# Patient Record
Sex: MALE | Race: WHITE | NOT HISPANIC OR LATINO | URBAN - METROPOLITAN AREA
[De-identification: names, ages, dates, MRNs, and addresses within clinical notes are randomized per-mention and may not be internally consistent; named-entity substitution may affect disease eponyms.]

---

## 2017-02-20 ENCOUNTER — EMERGENCY (EMERGENCY)
Facility: HOSPITAL | Age: 52
LOS: 1 days | Discharge: HOME | End: 2017-02-20
Attending: EMERGENCY MEDICINE | Admitting: EMERGENCY MEDICINE
Payer: COMMERCIAL

## 2017-02-20 VITALS
TEMPERATURE: 98 F | DIASTOLIC BLOOD PRESSURE: 97 MMHG | WEIGHT: 220.46 LBS | RESPIRATION RATE: 18 BRPM | HEIGHT: 68 IN | HEART RATE: 80 BPM | SYSTOLIC BLOOD PRESSURE: 145 MMHG | OXYGEN SATURATION: 97 %

## 2017-02-20 DIAGNOSIS — Z79.2 LONG TERM (CURRENT) USE OF ANTIBIOTICS: ICD-10-CM

## 2017-02-20 DIAGNOSIS — Z79.899 OTHER LONG TERM (CURRENT) DRUG THERAPY: ICD-10-CM

## 2017-02-20 DIAGNOSIS — E03.9 HYPOTHYROIDISM, UNSPECIFIED: ICD-10-CM

## 2017-02-20 DIAGNOSIS — S22.39XA FRACTURE OF ONE RIB, UNSPECIFIED SIDE, INITIAL ENCOUNTER FOR CLOSED FRACTURE: Chronic | ICD-10-CM

## 2017-02-20 DIAGNOSIS — M79.671 PAIN IN RIGHT FOOT: ICD-10-CM

## 2017-02-20 DIAGNOSIS — Z79.1 LONG TERM (CURRENT) USE OF NON-STEROIDAL ANTI-INFLAMMATORIES (NSAID): ICD-10-CM

## 2017-02-20 DIAGNOSIS — L03.031 CELLULITIS OF RIGHT TOE: ICD-10-CM

## 2017-02-20 PROCEDURE — 99283 EMERGENCY DEPT VISIT LOW MDM: CPT | Mod: 25

## 2017-02-20 PROCEDURE — 99053 MED SERV 10PM-8AM 24 HR FAC: CPT

## 2017-02-20 PROCEDURE — 99283 EMERGENCY DEPT VISIT LOW MDM: CPT

## 2017-02-20 RX ORDER — AZTREONAM 2 G
1 VIAL (EA) INJECTION
Qty: 20 | Refills: 0 | OUTPATIENT
Start: 2017-02-20 | End: 2017-03-02

## 2017-02-20 RX ORDER — IBUPROFEN 200 MG
600 TABLET ORAL ONCE
Qty: 0 | Refills: 0 | Status: COMPLETED | OUTPATIENT
Start: 2017-02-20 | End: 2017-02-20

## 2017-02-20 RX ORDER — CEPHALEXIN 500 MG
500 CAPSULE ORAL ONCE
Qty: 0 | Refills: 0 | Status: COMPLETED | OUTPATIENT
Start: 2017-02-20 | End: 2017-02-20

## 2017-02-20 RX ORDER — CEPHALEXIN 500 MG
1 CAPSULE ORAL
Qty: 40 | Refills: 0 | OUTPATIENT
Start: 2017-02-20 | End: 2017-03-02

## 2017-02-20 RX ADMIN — Medication 500 MILLIGRAM(S): at 23:36

## 2017-02-20 RX ADMIN — Medication 600 MILLIGRAM(S): at 23:36

## 2017-02-20 RX ADMIN — Medication 1 TABLET(S): at 23:36

## 2017-02-20 NOTE — ED PROVIDER NOTE - OBJECTIVE STATEMENT
toe pain  52 yo , noticed sore btw his pinky and fourth toe on his rt foot today, + painful, worse w pressing on it and worse with walking, ho of similar infection in past which resolved w antibiotics.

## 2017-02-20 NOTE — ED PROVIDER NOTE - SKIN, MLM
ulceration btw pinky and 4th toe w small 1 cm area of cellulitis cellulitis , no drainage or fluctuance

## 2017-03-06 ENCOUNTER — EMERGENCY (EMERGENCY)
Facility: HOSPITAL | Age: 52
LOS: 1 days | Discharge: HOME | End: 2017-03-06
Attending: EMERGENCY MEDICINE | Admitting: EMERGENCY MEDICINE
Payer: COMMERCIAL

## 2017-03-06 VITALS
RESPIRATION RATE: 18 BRPM | OXYGEN SATURATION: 97 % | SYSTOLIC BLOOD PRESSURE: 143 MMHG | TEMPERATURE: 99 F | WEIGHT: 210.1 LBS | DIASTOLIC BLOOD PRESSURE: 79 MMHG | HEART RATE: 92 BPM

## 2017-03-06 DIAGNOSIS — Z79.1 LONG TERM (CURRENT) USE OF NON-STEROIDAL ANTI-INFLAMMATORIES (NSAID): ICD-10-CM

## 2017-03-06 DIAGNOSIS — Z76.0 ENCOUNTER FOR ISSUE OF REPEAT PRESCRIPTION: ICD-10-CM

## 2017-03-06 DIAGNOSIS — S22.39XA FRACTURE OF ONE RIB, UNSPECIFIED SIDE, INITIAL ENCOUNTER FOR CLOSED FRACTURE: Chronic | ICD-10-CM

## 2017-03-06 DIAGNOSIS — Z79.2 LONG TERM (CURRENT) USE OF ANTIBIOTICS: ICD-10-CM

## 2017-03-06 DIAGNOSIS — Z72.0 TOBACCO USE: ICD-10-CM

## 2017-03-06 DIAGNOSIS — Z79.899 OTHER LONG TERM (CURRENT) DRUG THERAPY: ICD-10-CM

## 2017-03-06 PROCEDURE — 99283 EMERGENCY DEPT VISIT LOW MDM: CPT

## 2017-03-06 PROCEDURE — 99053 MED SERV 10PM-8AM 24 HR FAC: CPT

## 2017-03-06 PROCEDURE — 99283 EMERGENCY DEPT VISIT LOW MDM: CPT | Mod: 25

## 2017-03-06 RX ORDER — LEVOTHYROXINE SODIUM 125 MCG
200 TABLET ORAL ONCE
Qty: 0 | Refills: 0 | Status: COMPLETED | OUTPATIENT
Start: 2017-03-06 | End: 2017-03-06

## 2017-03-06 RX ORDER — LEVOTHYROXINE SODIUM 125 MCG
1 TABLET ORAL
Qty: 7 | Refills: 0 | OUTPATIENT
Start: 2017-03-06 | End: 2017-03-13

## 2017-03-06 RX ADMIN — Medication 200 MICROGRAM(S): at 01:55

## 2017-03-06 NOTE — ED PROVIDER NOTE - OBJECTIVE STATEMENT
50 y/o m hx hypothyroidism presents stating he needs a refill of his levothyroxine.  Pt stating he last took yesterday, concerned when he misses a dose he feels tired.  Denies n/v/d, fever, all other ROS negative.

## 2017-03-06 NOTE — ED PROVIDER NOTE - MEDICAL DECISION MAKING DETAILS
50 y/o m hx hypothyroidism presents asking for rx refill levothyroxine, stating hasn't taken since yesterday.  Pt given 1 dose in ED, 7 day rx, f/u pmd.

## 2017-04-04 ENCOUNTER — EMERGENCY (EMERGENCY)
Facility: HOSPITAL | Age: 52
LOS: 1 days | Discharge: HOME | End: 2017-04-04
Attending: EMERGENCY MEDICINE | Admitting: EMERGENCY MEDICINE
Payer: COMMERCIAL

## 2017-04-04 VITALS
SYSTOLIC BLOOD PRESSURE: 160 MMHG | DIASTOLIC BLOOD PRESSURE: 100 MMHG | RESPIRATION RATE: 18 BRPM | HEART RATE: 78 BPM | TEMPERATURE: 98 F | OXYGEN SATURATION: 97 %

## 2017-04-04 DIAGNOSIS — Z79.1 LONG TERM (CURRENT) USE OF NON-STEROIDAL ANTI-INFLAMMATORIES (NSAID): ICD-10-CM

## 2017-04-04 DIAGNOSIS — S22.39XA FRACTURE OF ONE RIB, UNSPECIFIED SIDE, INITIAL ENCOUNTER FOR CLOSED FRACTURE: Chronic | ICD-10-CM

## 2017-04-04 DIAGNOSIS — R07.81 PLEURODYNIA: ICD-10-CM

## 2017-04-04 DIAGNOSIS — Z79.2 LONG TERM (CURRENT) USE OF ANTIBIOTICS: ICD-10-CM

## 2017-04-04 DIAGNOSIS — Z79.899 OTHER LONG TERM (CURRENT) DRUG THERAPY: ICD-10-CM

## 2017-04-04 DIAGNOSIS — Z72.0 TOBACCO USE: ICD-10-CM

## 2017-04-04 PROCEDURE — 99053 MED SERV 10PM-8AM 24 HR FAC: CPT

## 2017-04-04 PROCEDURE — 99283 EMERGENCY DEPT VISIT LOW MDM: CPT | Mod: 25

## 2017-04-04 PROCEDURE — 99283 EMERGENCY DEPT VISIT LOW MDM: CPT

## 2017-04-04 RX ORDER — METHOCARBAMOL 500 MG/1
1 TABLET, FILM COATED ORAL
Qty: 15 | Refills: 0 | OUTPATIENT
Start: 2017-04-04 | End: 2017-04-09

## 2017-04-04 RX ORDER — CYCLOBENZAPRINE HYDROCHLORIDE 10 MG/1
5 TABLET, FILM COATED ORAL ONCE
Qty: 0 | Refills: 0 | Status: COMPLETED | OUTPATIENT
Start: 2017-04-04 | End: 2017-04-04

## 2017-04-04 RX ORDER — IBUPROFEN 200 MG
600 TABLET ORAL ONCE
Qty: 0 | Refills: 0 | Status: COMPLETED | OUTPATIENT
Start: 2017-04-04 | End: 2017-04-04

## 2017-04-04 RX ADMIN — Medication 600 MILLIGRAM(S): at 03:38

## 2017-04-04 RX ADMIN — CYCLOBENZAPRINE HYDROCHLORIDE 5 MILLIGRAM(S): 10 TABLET, FILM COATED ORAL at 03:38

## 2017-04-04 NOTE — ED PROVIDER NOTE - OBJECTIVE STATEMENT
51 m co rib pain- mild-moderate pain x 2 weeks worse with coughing  lower rib lateral ribs  taking naprosyn and getting massages with some relief  no sob no cp no n/v  no radiation  no f/c

## 2017-04-04 NOTE — ED ADULT NURSE NOTE - CHPI ED SYMPTOMS NEG
no stiffness/urinary incontinence/no tingling/no abrasion/no deformity/no bruising/no fever/no numbness

## 2017-04-04 NOTE — ED ADULT NURSE NOTE - OBJECTIVE STATEMENT
Pt walked into Ed with c/o of lower back pain that radiates to the right side of rib. Onset was 14 days ago. Pt. denies taking anything for the pain. PT. has no bruising and skin is intact. Denies hitting head, or LOC. Pt has steady gait.

## 2017-05-31 ENCOUNTER — EMERGENCY (EMERGENCY)
Facility: HOSPITAL | Age: 52
LOS: 1 days | Discharge: HOME | End: 2017-05-31
Attending: EMERGENCY MEDICINE | Admitting: EMERGENCY MEDICINE
Payer: COMMERCIAL

## 2017-05-31 VITALS
OXYGEN SATURATION: 96 % | SYSTOLIC BLOOD PRESSURE: 135 MMHG | TEMPERATURE: 97 F | DIASTOLIC BLOOD PRESSURE: 84 MMHG | RESPIRATION RATE: 18 BRPM | HEART RATE: 70 BPM

## 2017-05-31 DIAGNOSIS — L03.115 CELLULITIS OF RIGHT LOWER LIMB: ICD-10-CM

## 2017-05-31 DIAGNOSIS — L08.9 LOCAL INFECTION OF THE SKIN AND SUBCUTANEOUS TISSUE, UNSPECIFIED: ICD-10-CM

## 2017-05-31 DIAGNOSIS — Z79.1 LONG TERM (CURRENT) USE OF NON-STEROIDAL ANTI-INFLAMMATORIES (NSAID): ICD-10-CM

## 2017-05-31 DIAGNOSIS — Z76.0 ENCOUNTER FOR ISSUE OF REPEAT PRESCRIPTION: ICD-10-CM

## 2017-05-31 DIAGNOSIS — Z79.2 LONG TERM (CURRENT) USE OF ANTIBIOTICS: ICD-10-CM

## 2017-05-31 DIAGNOSIS — S22.39XA FRACTURE OF ONE RIB, UNSPECIFIED SIDE, INITIAL ENCOUNTER FOR CLOSED FRACTURE: Chronic | ICD-10-CM

## 2017-05-31 DIAGNOSIS — Z79.899 OTHER LONG TERM (CURRENT) DRUG THERAPY: ICD-10-CM

## 2017-05-31 DIAGNOSIS — E03.9 HYPOTHYROIDISM, UNSPECIFIED: ICD-10-CM

## 2017-05-31 PROCEDURE — 99283 EMERGENCY DEPT VISIT LOW MDM: CPT

## 2017-05-31 RX ORDER — CEPHALEXIN 500 MG
500 CAPSULE ORAL ONCE
Qty: 0 | Refills: 0 | Status: COMPLETED | OUTPATIENT
Start: 2017-05-31 | End: 2017-05-31

## 2017-05-31 RX ORDER — CEPHALEXIN 500 MG
1 CAPSULE ORAL
Qty: 40 | Refills: 0 | OUTPATIENT
Start: 2017-05-31 | End: 2017-06-10

## 2017-05-31 RX ORDER — LEVOTHYROXINE SODIUM 125 MCG
1 TABLET ORAL
Qty: 7 | Refills: 0 | OUTPATIENT
Start: 2017-05-31 | End: 2017-06-07

## 2017-05-31 RX ORDER — AZTREONAM 2 G
1 VIAL (EA) INJECTION
Qty: 20 | Refills: 0 | OUTPATIENT
Start: 2017-05-31 | End: 2017-06-10

## 2017-05-31 RX ADMIN — Medication 500 MILLIGRAM(S): at 10:42

## 2017-05-31 RX ADMIN — Medication 1 TABLET(S): at 10:42

## 2017-05-31 NOTE — ED PROVIDER NOTE - OBJECTIVE STATEMENT
50 y/o male with hx of hypothyroidism c/o foot infection. pt states long hx of tinea pedis to feet and redness and pain began to 5th webspace past 24 hrs. pt reports hx of abscesses to area in past. no fever or chills. no d/c. no trauma. Also pt requesting synthroid refill. pt states almost out of medication and does not have an upcoming appt with pmd

## 2017-05-31 NOTE — ED PROVIDER NOTE - MEDICAL DECISION MAKING DETAILS
cellulitis and tinea pedis right foot. will tx with bactrim and keflex and wound check in 2 days. neurovascular intact. lotrimin cream given. d/w pt will refill synthroid for 1 wk but needs to f/u with pmd for future refills

## 2017-05-31 NOTE — ED PROVIDER NOTE - PHYSICAL EXAMINATION
+ cracking skin between toes of b/l feet. + redness and induration to 5th webspace right foot. no fluctuance. no streaking. neurovascular intact.

## 2017-06-01 ENCOUNTER — EMERGENCY (EMERGENCY)
Facility: HOSPITAL | Age: 52
LOS: 1 days | Discharge: HOME | End: 2017-06-01
Attending: EMERGENCY MEDICINE | Admitting: EMERGENCY MEDICINE
Payer: COMMERCIAL

## 2017-06-01 VITALS
TEMPERATURE: 98 F | RESPIRATION RATE: 16 BRPM | HEIGHT: 68 IN | SYSTOLIC BLOOD PRESSURE: 126 MMHG | OXYGEN SATURATION: 96 % | HEART RATE: 81 BPM | DIASTOLIC BLOOD PRESSURE: 84 MMHG | WEIGHT: 179.9 LBS

## 2017-06-01 DIAGNOSIS — L02.611 CUTANEOUS ABSCESS OF RIGHT FOOT: ICD-10-CM

## 2017-06-01 DIAGNOSIS — L02.213 CUTANEOUS ABSCESS OF CHEST WALL: ICD-10-CM

## 2017-06-01 DIAGNOSIS — L02.411 CUTANEOUS ABSCESS OF RIGHT AXILLA: ICD-10-CM

## 2017-06-01 DIAGNOSIS — E03.9 HYPOTHYROIDISM, UNSPECIFIED: ICD-10-CM

## 2017-06-01 DIAGNOSIS — S22.39XA FRACTURE OF ONE RIB, UNSPECIFIED SIDE, INITIAL ENCOUNTER FOR CLOSED FRACTURE: Chronic | ICD-10-CM

## 2017-06-01 DIAGNOSIS — F17.200 NICOTINE DEPENDENCE, UNSPECIFIED, UNCOMPLICATED: ICD-10-CM

## 2017-06-01 DIAGNOSIS — F19.10 OTHER PSYCHOACTIVE SUBSTANCE ABUSE, UNCOMPLICATED: ICD-10-CM

## 2017-06-01 DIAGNOSIS — Z79.899 OTHER LONG TERM (CURRENT) DRUG THERAPY: ICD-10-CM

## 2017-06-01 PROCEDURE — 99283 EMERGENCY DEPT VISIT LOW MDM: CPT

## 2017-06-01 PROCEDURE — 99283 EMERGENCY DEPT VISIT LOW MDM: CPT | Mod: 25

## 2017-06-01 RX ORDER — NYSTATIN CREAM 100000 [USP'U]/G
1 CREAM TOPICAL ONCE
Qty: 0 | Refills: 0 | Status: COMPLETED | OUTPATIENT
Start: 2017-06-01 | End: 2017-06-01

## 2017-06-01 RX ADMIN — NYSTATIN CREAM 1 APPLICATION(S): 100000 CREAM TOPICAL at 04:04

## 2017-06-01 NOTE — ED PROVIDER NOTE - PHYSICAL EXAMINATION
small excoriation right upper chest noted , Right upper arm with small 2 -3 cm erythematous circular region with along with some firm central induration albeit no fluctuance no drainage + right fifth toe with  moist web space skin breakdown and tinea type process about the foot

## 2017-06-01 NOTE — ED PROVIDER NOTE - CARE PLAN
Principal Discharge DX:	Abscess  Secondary Diagnosis:	Polysubstance abuse  Secondary Diagnosis:	Hypothyroid

## 2017-06-01 NOTE — ED PROVIDER NOTE - OBJECTIVE STATEMENT
patient requests wound evaluations for toe right fifth infective process along with right upper inner arm and breast lesions + states Hx PCP use and thereafter a few days ago noted lesions and came to ED + taking bactrim and keflex + admits to both compliance and Hx problems with this 5th right toe web space in past

## 2017-06-01 NOTE — ED ADULT NURSE NOTE - OBJECTIVE STATEMENT
52 y/o male with hx of polysubstance abuse and abscess arrived to [facility] ER reporting multiple abscess to body( right arm, right foot, and right chest) accompanied with pain. Pt verbalized that he smoked PCP earlier tonight. Pt verbalized that he was evaluated for the abscess to the right foot and was prescribed Bactrim DS and Keflex today and only took one dose. Pt verbalized that the abscess to right arm just started after scratching the area. Upon assessment, multiple abscess noted with no active drainage, abdomen soft, lung fields WNL, breathing is equal and unlabored, pulses palpable. Pt denies fever, chills, LOC, SOB, weakness, fatigue, recent travel, recent injury, chest pain, palpitations, nausea, vomiting, diarrhea, headache, blurred vision, and slurred speech. Care in progress. Awaiting disposition

## 2017-06-01 NOTE — ED PROVIDER NOTE - MEDICAL DECISION MAKING DETAILS
toe seems as could benefit with nystating and podiatry follow up , Upper arm possible early abscess though seemingly not " ready" for I&D and as patient MRSA risk from substance abuse lifestyle agree with bactrm / keflex regime started day + wound checks and wound care discussed

## 2017-06-25 ENCOUNTER — EMERGENCY (EMERGENCY)
Facility: HOSPITAL | Age: 52
LOS: 1 days | Discharge: HOME | End: 2017-06-25
Admitting: EMERGENCY MEDICINE
Payer: COMMERCIAL

## 2017-06-25 VITALS
RESPIRATION RATE: 18 BRPM | DIASTOLIC BLOOD PRESSURE: 93 MMHG | HEART RATE: 76 BPM | OXYGEN SATURATION: 97 % | WEIGHT: 189.6 LBS | TEMPERATURE: 99 F | SYSTOLIC BLOOD PRESSURE: 135 MMHG

## 2017-06-25 DIAGNOSIS — R10.30 LOWER ABDOMINAL PAIN, UNSPECIFIED: ICD-10-CM

## 2017-06-25 DIAGNOSIS — Z79.899 OTHER LONG TERM (CURRENT) DRUG THERAPY: ICD-10-CM

## 2017-06-25 DIAGNOSIS — S22.39XA FRACTURE OF ONE RIB, UNSPECIFIED SIDE, INITIAL ENCOUNTER FOR CLOSED FRACTURE: Chronic | ICD-10-CM

## 2017-06-25 DIAGNOSIS — Z79.2 LONG TERM (CURRENT) USE OF ANTIBIOTICS: ICD-10-CM

## 2017-06-25 DIAGNOSIS — L08.9 LOCAL INFECTION OF THE SKIN AND SUBCUTANEOUS TISSUE, UNSPECIFIED: ICD-10-CM

## 2017-06-25 PROCEDURE — 99283 EMERGENCY DEPT VISIT LOW MDM: CPT

## 2017-06-25 RX ORDER — AZTREONAM 2 G
1 VIAL (EA) INJECTION
Qty: 20 | Refills: 0 | OUTPATIENT
Start: 2017-06-25 | End: 2017-07-05

## 2017-06-25 NOTE — ED PROVIDER NOTE - MEDICAL DECISION MAKING DETAILS
52 y/o pustule on buttock; no drainable abscess, pustule removed, will place on bactrim, recommend warm compresses

## 2017-06-25 NOTE — ED PROVIDER NOTE - OBJECTIVE STATEMENT
50 y/o m presents c/o pimple to right buttock today.  Pt stating he has had several abscesses drained in the past, wanted to be evaluated.  Denies fever, drainage, all other ROS negative.

## 2017-08-08 ENCOUNTER — EMERGENCY (EMERGENCY)
Facility: HOSPITAL | Age: 52
LOS: 1 days | Discharge: HOME | End: 2017-08-08
Attending: EMERGENCY MEDICINE | Admitting: EMERGENCY MEDICINE
Payer: COMMERCIAL

## 2017-08-08 VITALS
SYSTOLIC BLOOD PRESSURE: 149 MMHG | HEART RATE: 76 BPM | WEIGHT: 179.9 LBS | HEIGHT: 68 IN | RESPIRATION RATE: 17 BRPM | OXYGEN SATURATION: 97 % | DIASTOLIC BLOOD PRESSURE: 97 MMHG | TEMPERATURE: 98 F

## 2017-08-08 DIAGNOSIS — Z79.1 LONG TERM (CURRENT) USE OF NON-STEROIDAL ANTI-INFLAMMATORIES (NSAID): ICD-10-CM

## 2017-08-08 DIAGNOSIS — L98.9 DISORDER OF THE SKIN AND SUBCUTANEOUS TISSUE, UNSPECIFIED: ICD-10-CM

## 2017-08-08 DIAGNOSIS — L02.511 CUTANEOUS ABSCESS OF RIGHT HAND: ICD-10-CM

## 2017-08-08 DIAGNOSIS — E03.9 HYPOTHYROIDISM, UNSPECIFIED: ICD-10-CM

## 2017-08-08 DIAGNOSIS — Z79.2 LONG TERM (CURRENT) USE OF ANTIBIOTICS: ICD-10-CM

## 2017-08-08 DIAGNOSIS — Z79.899 OTHER LONG TERM (CURRENT) DRUG THERAPY: ICD-10-CM

## 2017-08-08 DIAGNOSIS — S22.39XA FRACTURE OF ONE RIB, UNSPECIFIED SIDE, INITIAL ENCOUNTER FOR CLOSED FRACTURE: Chronic | ICD-10-CM

## 2017-08-08 PROCEDURE — 99283 EMERGENCY DEPT VISIT LOW MDM: CPT

## 2017-08-08 RX ORDER — AZTREONAM 2 G
1 VIAL (EA) INJECTION
Qty: 10 | Refills: 0 | OUTPATIENT
Start: 2017-08-08 | End: 2017-08-13

## 2017-08-08 RX ORDER — LEVOTHYROXINE SODIUM 125 MCG
1 TABLET ORAL
Qty: 15 | Refills: 0 | OUTPATIENT
Start: 2017-08-08 | End: 2017-08-23

## 2017-08-08 RX ORDER — BACITRACIN ZINC 500 UNIT/G
1 OINTMENT IN PACKET (EA) TOPICAL ONCE
Qty: 0 | Refills: 0 | Status: COMPLETED | OUTPATIENT
Start: 2017-08-08 | End: 2017-08-08

## 2017-08-08 RX ADMIN — Medication 1 TABLET(S): at 14:06

## 2017-08-08 RX ADMIN — Medication 1 APPLICATION(S): at 14:06

## 2017-08-08 NOTE — ED ADULT TRIAGE NOTE - OTHER COMPLAINTS
pt c.o R foot pain with c.o reoccurring abscess to R foot. denies fever/chills. "I have athletes foot"

## 2017-08-08 NOTE — ED PROVIDER NOTE - MUSCULOSKELETAL, MLM
right foot - erythematous small nonfluctuant lesion by 4th metatarsal head, whitish discharge between fourth and fifth toes most consistent with fungal infection, FROM of toes, no distal nv deficit

## 2017-08-08 NOTE — ED PROVIDER NOTE - OBJECTIVE STATEMENT
50 y/o m c/o blister by fourth metatarsal with lesion between fourth and fifth toes.  Denies fever, chills.  Seen in ED on multiple different occasions for similar symptoms.  Ambulating without difficulty.

## 2017-08-08 NOTE — ED PROVIDER NOTE - MEDICAL DECISION MAKING DETAILS
pt with small erythematous lesion to right fourth metatarsal without evidence of extensive cellulitis or abscess, requesting bacitracin and short course of bactrim - also requesting synthroid refill -

## 2017-08-08 NOTE — ED ADULT NURSE NOTE - OBJECTIVE STATEMENT
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 Upon assessment pt noted to have a skin blister in the dorsal area of the right foot, no drainage noted. Pt denies any pain. Awaiting initial evaluation. Will continue to monitor.

## 2017-10-02 ENCOUNTER — EMERGENCY (EMERGENCY)
Facility: HOSPITAL | Age: 52
LOS: 1 days | Discharge: HOME | End: 2017-10-02
Attending: EMERGENCY MEDICINE | Admitting: EMERGENCY MEDICINE
Payer: COMMERCIAL

## 2017-10-02 VITALS
SYSTOLIC BLOOD PRESSURE: 156 MMHG | TEMPERATURE: 98 F | WEIGHT: 199.96 LBS | HEIGHT: 68 IN | RESPIRATION RATE: 17 BRPM | DIASTOLIC BLOOD PRESSURE: 115 MMHG | OXYGEN SATURATION: 96 % | HEART RATE: 79 BPM

## 2017-10-02 VITALS
DIASTOLIC BLOOD PRESSURE: 99 MMHG | OXYGEN SATURATION: 96 % | SYSTOLIC BLOOD PRESSURE: 162 MMHG | RESPIRATION RATE: 17 BRPM | TEMPERATURE: 98 F | HEART RATE: 71 BPM

## 2017-10-02 DIAGNOSIS — Z79.2 LONG TERM (CURRENT) USE OF ANTIBIOTICS: ICD-10-CM

## 2017-10-02 DIAGNOSIS — S22.39XA FRACTURE OF ONE RIB, UNSPECIFIED SIDE, INITIAL ENCOUNTER FOR CLOSED FRACTURE: Chronic | ICD-10-CM

## 2017-10-02 DIAGNOSIS — L03.115 CELLULITIS OF RIGHT LOWER LIMB: ICD-10-CM

## 2017-10-02 DIAGNOSIS — Z79.1 LONG TERM (CURRENT) USE OF NON-STEROIDAL ANTI-INFLAMMATORIES (NSAID): ICD-10-CM

## 2017-10-02 DIAGNOSIS — M79.89 OTHER SPECIFIED SOFT TISSUE DISORDERS: ICD-10-CM

## 2017-10-02 DIAGNOSIS — Z79.899 OTHER LONG TERM (CURRENT) DRUG THERAPY: ICD-10-CM

## 2017-10-02 DIAGNOSIS — E03.9 HYPOTHYROIDISM, UNSPECIFIED: ICD-10-CM

## 2017-10-02 PROCEDURE — 99283 EMERGENCY DEPT VISIT LOW MDM: CPT

## 2017-10-02 RX ORDER — AZTREONAM 2 G
1 VIAL (EA) INJECTION
Qty: 20 | Refills: 0 | OUTPATIENT
Start: 2017-10-02 | End: 2017-10-12

## 2017-10-02 RX ADMIN — Medication 1 TABLET(S): at 19:20

## 2017-10-02 NOTE — ED ADULT TRIAGE NOTE - OTHER COMPLAINTS
pt c.o R foot swelling, redness and discomfort since yesterday. denies injury/trauma. denies fever/chills. pt hypertensive in triage, asymptomatic. denies pmhx.

## 2017-10-02 NOTE — ED ADULT NURSE NOTE - OBJECTIVE STATEMENT
Patient presents to the ED complaining of infection to left foot. Complaining of discomfort. Mild swelling noted to lower foot. Patient states that he usually gets bactrim and that fixes it. No fever or chills.

## 2017-10-02 NOTE — ED PROVIDER NOTE - OBJECTIVE STATEMENT
here with swelling/redness of right foot for past 2 days.  Denies trauma.  States he has had it before and attributed to fungal infection between toes.  Denies ivda.  No fever/chills

## 2017-10-04 ENCOUNTER — INPATIENT (INPATIENT)
Facility: HOSPITAL | Age: 52
LOS: 0 days | Discharge: HOME | DRG: 872 | End: 2017-10-05
Attending: STUDENT IN AN ORGANIZED HEALTH CARE EDUCATION/TRAINING PROGRAM | Admitting: STUDENT IN AN ORGANIZED HEALTH CARE EDUCATION/TRAINING PROGRAM
Payer: COMMERCIAL

## 2017-10-04 VITALS
SYSTOLIC BLOOD PRESSURE: 167 MMHG | TEMPERATURE: 98 F | OXYGEN SATURATION: 98 % | RESPIRATION RATE: 18 BRPM | DIASTOLIC BLOOD PRESSURE: 95 MMHG | HEART RATE: 92 BPM

## 2017-10-04 DIAGNOSIS — Z29.9 ENCOUNTER FOR PROPHYLACTIC MEASURES, UNSPECIFIED: ICD-10-CM

## 2017-10-04 DIAGNOSIS — R63.8 OTHER SYMPTOMS AND SIGNS CONCERNING FOOD AND FLUID INTAKE: ICD-10-CM

## 2017-10-04 DIAGNOSIS — E03.9 HYPOTHYROIDISM, UNSPECIFIED: ICD-10-CM

## 2017-10-04 DIAGNOSIS — D72.829 ELEVATED WHITE BLOOD CELL COUNT, UNSPECIFIED: ICD-10-CM

## 2017-10-04 DIAGNOSIS — S22.39XA FRACTURE OF ONE RIB, UNSPECIFIED SIDE, INITIAL ENCOUNTER FOR CLOSED FRACTURE: Chronic | ICD-10-CM

## 2017-10-04 DIAGNOSIS — R19.00 INTRA-ABDOMINAL AND PELVIC SWELLING, MASS AND LUMP, UNSPECIFIED SITE: ICD-10-CM

## 2017-10-04 DIAGNOSIS — L03.115 CELLULITIS OF RIGHT LOWER LIMB: ICD-10-CM

## 2017-10-04 DIAGNOSIS — E87.2 ACIDOSIS: ICD-10-CM

## 2017-10-04 DIAGNOSIS — F19.10 OTHER PSYCHOACTIVE SUBSTANCE ABUSE, UNCOMPLICATED: ICD-10-CM

## 2017-10-04 DIAGNOSIS — D58.2 OTHER HEMOGLOBINOPATHIES: ICD-10-CM

## 2017-10-04 LAB
ALBUMIN SERPL ELPH-MCNC: 4.7 G/DL — SIGNIFICANT CHANGE UP (ref 3.3–5)
ALP SERPL-CCNC: 70 U/L — SIGNIFICANT CHANGE UP (ref 40–120)
ALT FLD-CCNC: 46 U/L — HIGH (ref 10–45)
ANION GAP SERPL CALC-SCNC: 18 MMOL/L — HIGH (ref 5–17)
APTT BLD: 39.8 SEC — HIGH (ref 27.5–37.4)
AST SERPL-CCNC: 32 U/L — SIGNIFICANT CHANGE UP (ref 10–40)
BASOPHILS NFR BLD AUTO: 0.2 % — SIGNIFICANT CHANGE UP (ref 0–2)
BILIRUB SERPL-MCNC: 1 MG/DL — SIGNIFICANT CHANGE UP (ref 0.2–1.2)
BUN SERPL-MCNC: 13 MG/DL — SIGNIFICANT CHANGE UP (ref 7–23)
CALCIUM SERPL-MCNC: 10.1 MG/DL — SIGNIFICANT CHANGE UP (ref 8.4–10.5)
CHLORIDE SERPL-SCNC: 99 MMOL/L — SIGNIFICANT CHANGE UP (ref 96–108)
CO2 SERPL-SCNC: 20 MMOL/L — LOW (ref 22–31)
CREAT SERPL-MCNC: 1.06 MG/DL — SIGNIFICANT CHANGE UP (ref 0.5–1.3)
CRP SERPL-MCNC: 1.5 MG/DL — HIGH (ref 0–0.4)
EOSINOPHIL NFR BLD AUTO: 0.5 % — SIGNIFICANT CHANGE UP (ref 0–6)
ERYTHROCYTE [SEDIMENTATION RATE] IN BLOOD: 3 MM/HR — SIGNIFICANT CHANGE UP
GLUCOSE SERPL-MCNC: 104 MG/DL — HIGH (ref 70–99)
HCT VFR BLD CALC: 52.4 % — HIGH (ref 39–50)
HGB BLD-MCNC: 18.4 G/DL — HIGH (ref 13–17)
HIV 1+2 AB+HIV1 P24 AG SERPL QL IA: SIGNIFICANT CHANGE UP
INR BLD: 1.03 — SIGNIFICANT CHANGE UP (ref 0.88–1.16)
LACTATE SERPL-SCNC: 1.6 MMOL/L — SIGNIFICANT CHANGE UP (ref 0.5–2)
LYMPHOCYTES # BLD AUTO: 14.8 % — SIGNIFICANT CHANGE UP (ref 13–44)
MCHC RBC-ENTMCNC: 32.2 PG — SIGNIFICANT CHANGE UP (ref 27–34)
MCHC RBC-ENTMCNC: 35.1 G/DL — SIGNIFICANT CHANGE UP (ref 32–36)
MCV RBC AUTO: 91.8 FL — SIGNIFICANT CHANGE UP (ref 80–100)
MONOCYTES NFR BLD AUTO: 8.1 % — SIGNIFICANT CHANGE UP (ref 2–14)
NEUTROPHILS NFR BLD AUTO: 76.4 % — SIGNIFICANT CHANGE UP (ref 43–77)
PLATELET # BLD AUTO: 286 K/UL — SIGNIFICANT CHANGE UP (ref 150–400)
POTASSIUM SERPL-MCNC: 3.7 MMOL/L — SIGNIFICANT CHANGE UP (ref 3.5–5.3)
POTASSIUM SERPL-SCNC: 3.7 MMOL/L — SIGNIFICANT CHANGE UP (ref 3.5–5.3)
PROT SERPL-MCNC: 8.2 G/DL — SIGNIFICANT CHANGE UP (ref 6–8.3)
PROTHROM AB SERPL-ACNC: 11.4 SEC — SIGNIFICANT CHANGE UP (ref 9.8–12.7)
RBC # BLD: 5.71 M/UL — SIGNIFICANT CHANGE UP (ref 4.2–5.8)
RBC # FLD: 13.8 % — SIGNIFICANT CHANGE UP (ref 10.3–16.9)
SODIUM SERPL-SCNC: 137 MMOL/L — SIGNIFICANT CHANGE UP (ref 135–145)
WBC # BLD: 14.9 K/UL — HIGH (ref 3.8–10.5)
WBC # FLD AUTO: 14.9 K/UL — HIGH (ref 3.8–10.5)

## 2017-10-04 PROCEDURE — 73630 X-RAY EXAM OF FOOT: CPT | Mod: 26,RT

## 2017-10-04 PROCEDURE — 71020: CPT | Mod: 26

## 2017-10-04 PROCEDURE — 99285 EMERGENCY DEPT VISIT HI MDM: CPT | Mod: 25

## 2017-10-04 PROCEDURE — 73630 X-RAY EXAM OF FOOT: CPT | Mod: 26

## 2017-10-04 PROCEDURE — 99223 1ST HOSP IP/OBS HIGH 75: CPT | Mod: GC

## 2017-10-04 RX ORDER — INFLUENZA VIRUS VACCINE 15; 15; 15; 15 UG/.5ML; UG/.5ML; UG/.5ML; UG/.5ML
0.5 SUSPENSION INTRAMUSCULAR ONCE
Qty: 0 | Refills: 0 | Status: DISCONTINUED | OUTPATIENT
Start: 2017-10-04 | End: 2017-10-05

## 2017-10-04 RX ORDER — VANCOMYCIN HCL 1 G
1000 VIAL (EA) INTRAVENOUS ONCE
Qty: 0 | Refills: 0 | Status: DISCONTINUED | OUTPATIENT
Start: 2017-10-04 | End: 2017-10-04

## 2017-10-04 RX ORDER — VANCOMYCIN HCL 1 G
1250 VIAL (EA) INTRAVENOUS ONCE
Qty: 0 | Refills: 0 | Status: COMPLETED | OUTPATIENT
Start: 2017-10-04 | End: 2017-10-04

## 2017-10-04 RX ORDER — THIAMINE MONONITRATE (VIT B1) 100 MG
100 TABLET ORAL DAILY
Qty: 0 | Refills: 0 | Status: DISCONTINUED | OUTPATIENT
Start: 2017-10-04 | End: 2017-10-05

## 2017-10-04 RX ORDER — NICOTINE POLACRILEX 2 MG
1 GUM BUCCAL DAILY
Qty: 0 | Refills: 0 | Status: DISCONTINUED | OUTPATIENT
Start: 2017-10-04 | End: 2017-10-05

## 2017-10-04 RX ORDER — SODIUM CHLORIDE 9 MG/ML
1000 INJECTION INTRAMUSCULAR; INTRAVENOUS; SUBCUTANEOUS ONCE
Qty: 0 | Refills: 0 | Status: COMPLETED | OUTPATIENT
Start: 2017-10-04 | End: 2017-10-04

## 2017-10-04 RX ORDER — FOLIC ACID 0.8 MG
1 TABLET ORAL DAILY
Qty: 0 | Refills: 0 | Status: DISCONTINUED | OUTPATIENT
Start: 2017-10-04 | End: 2017-10-05

## 2017-10-04 RX ORDER — KETOROLAC TROMETHAMINE 30 MG/ML
30 SYRINGE (ML) INJECTION ONCE
Qty: 0 | Refills: 0 | Status: DISCONTINUED | OUTPATIENT
Start: 2017-10-04 | End: 2017-10-04

## 2017-10-04 RX ORDER — LEVOTHYROXINE SODIUM 125 MCG
150 TABLET ORAL DAILY
Qty: 0 | Refills: 0 | Status: DISCONTINUED | OUTPATIENT
Start: 2017-10-04 | End: 2017-10-05

## 2017-10-04 RX ORDER — DIPHENHYDRAMINE HCL 50 MG
25 CAPSULE ORAL ONCE
Qty: 0 | Refills: 0 | Status: COMPLETED | OUTPATIENT
Start: 2017-10-04 | End: 2017-10-04

## 2017-10-04 RX ADMIN — Medication 30 MILLIGRAM(S): at 14:59

## 2017-10-04 RX ADMIN — Medication 25 MILLIGRAM(S): at 15:40

## 2017-10-04 RX ADMIN — Medication 30 MILLIGRAM(S): at 15:29

## 2017-10-04 RX ADMIN — SODIUM CHLORIDE 3603.6 MILLILITER(S): 9 INJECTION INTRAMUSCULAR; INTRAVENOUS; SUBCUTANEOUS at 19:28

## 2017-10-04 RX ADMIN — Medication 100 MILLIGRAM(S): at 16:23

## 2017-10-04 RX ADMIN — Medication 166.67 MILLIGRAM(S): at 14:59

## 2017-10-04 NOTE — H&P ADULT - ASSESSMENT
50 y/o M PMHx hypothyroidism, presents for swollen right foot for 4 days, failed outpatient treatment for cellulitis. 50 y/o M PMHx hypothyroidism, recurrent cellulitis, presents for swollen right foot for 4 days, failed outpatient treatment for cellulitis.

## 2017-10-04 NOTE — ED ADULT NURSE REASSESSMENT NOTE - NS ED NURSE REASSESS COMMENT FT1
pt noted to have small wheel above IV site and two more to upper arm, IV infusion of vancomycin stopped, PA and pharmacist made aware. Pt given benadryl and pending to now start on clindamycin. Pharmacy states this is not considered an allergic reaction and that infusion could be slowed but PA decided to discontinue medication.

## 2017-10-04 NOTE — ED PROVIDER NOTE - MEDICAL DECISION MAKING DETAILS
cellulitis with abscesss of right foot.  not systemically ill.  abscess drained by podiatry, needs admission for IV abx.

## 2017-10-04 NOTE — ED PROVIDER NOTE - PHYSICAL EXAMINATION
CONSTITUTIONAL: WD,WN. NAD.    SKIN: Normal color and turgor.   HEAD: NC/AT.  EYES: Conjunctiva clear. EOMI. PERRL.    ENT: Airway patent, OP clear. Nasal mucosa clear, no rhinorrhea.   RESPIRATORY:  Breathing non-labored. No retractions or accessory muscle use.  Lungs with coarse BS bilaterally.  CARDIOVASCULAR:  RRR, S1S2. No M/R/G.      GI:  Abdomen soft, nontender.    MSK: Dorsal right foot red, warm, and swollen.  + fluctuance at base of 4th-5th toes.  strong DP pulse.  NEURO: Alert and oriented; CHA with normal strength.  Normal speech and gait.

## 2017-10-04 NOTE — H&P ADULT - PROBLEM SELECTOR PLAN 6
Mildly decreased bicarb, likely metabolic acidosis. Anion gap elevated to 18. Possibly lactic acid in setting of alcohol use vs. acute infection.  - f/u lactate level; not meeting sepsis criteria, not hypotensive Unclear etiology. Possibly concentrated as pt dry on exam.  - 1L NS bolus now, follow in am Palpable firm abdominal nodule on exam, nontender.  - abdominal U/S

## 2017-10-04 NOTE — ED PROVIDER NOTE - OBJECTIVE STATEMENT
c/o pain and redness to right foot for about 4 days. seen here 2 days ago for right foot cellulitis and prescribed Bactrim DS, no evidence of abscess at that time.  has taken 5 doses of Bactrim DS so far, but his foot pain and swelling is getting worse.  denies any hx of trauma.  admits to heavy tobacco and marijuana use; occasionally uses cocaine. denies any injection drug use.  no hx of DM or immunosuppression.  no fver/chills.
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
## 2017-10-04 NOTE — ED ADULT NURSE NOTE - CHPI ED SYMPTOMS NEG
no chills/no weakness/no numbness/no dizziness/no vomiting/no nausea/no tingling/no fever/no decreased eating/drinking

## 2017-10-04 NOTE — ED PROVIDER NOTE - PROGRESS NOTE DETAILS
pt starting to develop mild red streaks and a small 1 cm urticarial wheal up left arm after vancomycin started.  less likely due to toradol which was given a few minutes before vancomycin started.  will stop vancomycin and give benadryl.  switch abx to clindamycin.      podiatry drained foot abscess; plan to admit overnight for IV abx. arm itching and redness resolved after vanco stopped; he is just getting the diphenhydramine now.

## 2017-10-04 NOTE — H&P ADULT - PROBLEM SELECTOR PLAN 2
Hx of alcohol use. Currently CIWA 1, mildly anxious.  - monitor CIWAs q4hrs  - c/w MV, thiamine, folate -will order ketoconazole for tinea lesion between 4th and 5th toes

## 2017-10-04 NOTE — H&P ADULT - PROBLEM SELECTOR PLAN 9
Regular diet.  No need for IVF, encourage PO. Regular diet.  No need for IVF, encourage PO.  Replete lytes K>4, Mg>2.    FULL CODE.  Admit to medicine. Nicotine patch, current smoker.  LOW IMPROVE SCORE 0, no need for DVT PPX.

## 2017-10-04 NOTE — H&P ADULT - PROBLEM SELECTOR PLAN 1
Failed outpatient Bactrim. s/p I&D of abscess with Podiatry.  - c/w clindamycin 600mg IV q8hrs  - f/u right foot x-ray; unlikely OM given CRP not significantly elevated. Failed outpatient Bactrim. s/p I&D of abscess with Podiatry.  - c/w clindamycin 600mg IV q8hrs  - f/u right foot x-ray; unlikely OM given CRP not significantly elevated.  - f/u wound cultures Failed outpatient Bactrim. s/p I&D of abscess with Podiatry. Foot x-ray, no acute changes suggestion OM, unlikely given CRP not significantly elevated.  - c/w clindamycin 600mg IV q8hrs  - f/u wound cultures

## 2017-10-04 NOTE — H&P ADULT - ATTENDING COMMENTS
pt seen and examined; reviewed vs, labs, xrays  pt a/f for right foot abscess/ cellulitis s/p I&D by podiatry  pt reports improvement in sxs since ED arrival ; current cellulitis attributed to tinea pedis lesion between 4th and 5th toes  agree w/ PE findings except pt appears less dry, also no appreciable abdominal findings felt on palpation; in addtion: pt slightly anxious; no JVD, no hand tremors pt seen and examined; reviewed vs, labs, xrays  pt a/f for right foot abscess/ cellulitis s/p I&D by podiatry    pt reports improvement in sxs since ED arrival ; current cellulitis attributed to tinea pedis lesion between 4th and 5th toes  agree w/ PE findings except pt appears less dry, also no appreciable abdominal findings felt on palpation; in addition: pt slightly anxious; no JVD, no hand tremors noted; R foot is erythematous w/ swelling,erythema more prominent around I&D wound; I&D wound noted at the dorsal aspect below the 4th and 5th toes, w/ packing in place; there is a tinea lesion in the web space of the 4th and 5th toes; wound was re-dressed    a/p:   1. right foot cellulitis/ abscess: s/p I&D, c/w clindamycin; follow up podiatry recs  2. tinea pedis: start ketoconazole topical to affected region  3. acidosis: monitor BMP s/p 1L bolus; monitor vs  4. monitor CBC given leukocytosis and elevated hb; will need outpatient heme evaluation if elevated Hb persists  5. abdominal sonogram pending on incidental abdominal finding, follow up results  6. polysubstance use: encouraged cessation of tobacco , encouraged pt to  c/w avoidance of cocaine; monitor ciwa

## 2017-10-04 NOTE — H&P ADULT - PROBLEM SELECTOR PLAN 10
Regular diet.  No need for IVF, encourage PO.  Replete lytes K>4, Mg>2.    FULL CODE.  Admit to medicine.
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
## 2017-10-04 NOTE — ED ADULT NURSE NOTE - OBJECTIVE STATEMENT
51 yr old male patient with c/o of R foot pain.  Open wound noted between 4th and 5th toe.  Denies IVDA.  Denies DM.  NO distress noted.  Breathing easily and unlabored.  Rfoot appears swollen.  States he was here the other day & told to come back if redness & swelling to foot gets worse, which he states it has not been getting better on bactrim.  A+OX3, ambulatory w steady gait.  Denies fevers.

## 2017-10-04 NOTE — H&P ADULT - PROBLEM SELECTOR PLAN 8
Lidocaine patch, current smoker.  LOW IMPROVE SCORE, no need for DVT PPX. Nicotine patch, current smoker.  LOW IMPROVE SCORE 0, no need for DVT PPX. Palpable firm abdominal nodule on exam, nontender.  - abdominal U/S - c/w home synthroid 150mcg daily

## 2017-10-04 NOTE — ED PROVIDER NOTE - NS ED ROS FT
CONSTITUTIONAL: No fever, chills, or weakness  NEURO: No headache, no dizziness, no syncope; No weakness/tingling/numbness  EYES: No visual changes  ENT: No rhinorrhea or sore throat  PULM: No cough or dyspnea  CV: No chest pain or palpitations  GI: No abdominal pain, vomiting, or diarrhea  : No dysuria, hematuria, frequency  MSK: No neck pain or back pain, no joint pain  SKIN: per HPI

## 2017-10-04 NOTE — ED PROVIDER NOTE - DIAGNOSTIC INTERPRETATION
CXR: lungs clear  XR right foot: soft tissue swelling, no subcut gas.  no bony erosion, fracture, or foreign body.

## 2017-10-04 NOTE — H&P ADULT - NSHPLABSRESULTS_GEN_ALL_CORE
LABS:                        18.4   14.9  )-----------( 286      ( 04 Oct 2017 14:24 )             52.4     10-04    137  |  99  |  13  ----------------------------<  104<H>  3.7   |  20<L>  |  1.06    Ca    10.1      04 Oct 2017 14:24    TPro  8.2  /  Alb  4.7  /  TBili  1.0  /  DBili  x   /  AST  32  /  ALT  46<H>  /  AlkPhos  70  10-04    PT/INR - ( 04 Oct 2017 14:24 )   PT: 11.4 sec;   INR: 1.03          PTT - ( 04 Oct 2017 14:24 )  PTT:39.8 sec      RADIOLOGY & ADDITIONAL TESTS:  Reviewed.

## 2017-10-04 NOTE — H&P ADULT - PROBLEM SELECTOR PLAN 5
Unclear etiology. Possibly concentrated as pt dry on exam.  - 1L NS bolus now, follow in am Likely 2/2 cellulitis. Not meeting sepsis criteria at this time.  - monitor

## 2017-10-04 NOTE — H&P ADULT - NSHPSOCIALHISTORY_GEN_ALL_CORE
Current smoker, 1/2-1ppd; drinks alcohol twice a week 1/2 pint vodka total; current daily marijuana used, previous cocaine user last used 6m ago. Lives alone with a cat. Sexually active with female partner. Current smoker, 1/2-1ppd; drinks alcohol twice a week 1/2 pint vodka total; current daily marijuana used, previous cocaine user last used 6m ago. Lives alone with a cat. Sexually active with female partner. works as an

## 2017-10-04 NOTE — H&P ADULT - PROBLEM SELECTOR PLAN 4
Likely 2/2 cellulitis. Not meeting sepsis criteria at this time.  - monitor - c/w home synthroid 150mcg daily

## 2017-10-04 NOTE — H&P ADULT - PROBLEM SELECTOR PLAN 7
Palpable firm abdominal nodule on exam, nontender.  - abdominal U/S Mildly decreased bicarb, likely metabolic acidosis. Anion gap elevated to 18. Possibly lactic acid in setting of alcohol use vs. acute infection.  - f/u lactate level; not meeting sepsis criteria, not hypotensive Hx of alcohol use. Currently CIWA 1, mildly anxious.  - monitor CIWAs q4hrs  - c/w MV, thiamine, folate

## 2017-10-04 NOTE — ED ADULT TRIAGE NOTE - CHIEF COMPLAINT QUOTE
pain ,swelling in right foot ,on bactrim at the moment ,was in ER a few days ago but redness has gotten worse

## 2017-10-05 ENCOUNTER — TRANSCRIPTION ENCOUNTER (OUTPATIENT)
Age: 52
End: 2017-10-05

## 2017-10-05 VITALS
OXYGEN SATURATION: 96 % | SYSTOLIC BLOOD PRESSURE: 153 MMHG | RESPIRATION RATE: 19 BRPM | DIASTOLIC BLOOD PRESSURE: 86 MMHG | HEART RATE: 66 BPM | TEMPERATURE: 98 F

## 2017-10-05 DIAGNOSIS — R19.00 INTRA-ABDOMINAL AND PELVIC SWELLING, MASS AND LUMP, UNSPECIFIED SITE: ICD-10-CM

## 2017-10-05 DIAGNOSIS — B35.3 TINEA PEDIS: ICD-10-CM

## 2017-10-05 DIAGNOSIS — E03.9 HYPOTHYROIDISM, UNSPECIFIED: ICD-10-CM

## 2017-10-05 LAB
ANION GAP SERPL CALC-SCNC: 14 MMOL/L — SIGNIFICANT CHANGE UP (ref 5–17)
BASOPHILS NFR BLD AUTO: 0.3 % — SIGNIFICANT CHANGE UP (ref 0–2)
BUN SERPL-MCNC: 15 MG/DL — SIGNIFICANT CHANGE UP (ref 7–23)
CALCIUM SERPL-MCNC: 9.2 MG/DL — SIGNIFICANT CHANGE UP (ref 8.4–10.5)
CHLORIDE SERPL-SCNC: 102 MMOL/L — SIGNIFICANT CHANGE UP (ref 96–108)
CO2 SERPL-SCNC: 21 MMOL/L — LOW (ref 22–31)
CREAT SERPL-MCNC: 1.1 MG/DL — SIGNIFICANT CHANGE UP (ref 0.5–1.3)
EOSINOPHIL NFR BLD AUTO: 2.5 % — SIGNIFICANT CHANGE UP (ref 0–6)
GLUCOSE SERPL-MCNC: 100 MG/DL — HIGH (ref 70–99)
GRAM STN FLD: SIGNIFICANT CHANGE UP
HBA1C BLD-MCNC: 5.3 % — SIGNIFICANT CHANGE UP (ref 4–5.6)
HCT VFR BLD CALC: 49 % — SIGNIFICANT CHANGE UP (ref 39–50)
HCV AB S/CO SERPL IA: 0.26 S/CO — SIGNIFICANT CHANGE UP
HCV AB SERPL-IMP: SIGNIFICANT CHANGE UP
HGB BLD-MCNC: 17.1 G/DL — HIGH (ref 13–17)
LYMPHOCYTES # BLD AUTO: 17.6 % — SIGNIFICANT CHANGE UP (ref 13–44)
MAGNESIUM SERPL-MCNC: 2.2 MG/DL — SIGNIFICANT CHANGE UP (ref 1.6–2.6)
MCHC RBC-ENTMCNC: 32.4 PG — SIGNIFICANT CHANGE UP (ref 27–34)
MCHC RBC-ENTMCNC: 34.9 G/DL — SIGNIFICANT CHANGE UP (ref 32–36)
MCV RBC AUTO: 92.8 FL — SIGNIFICANT CHANGE UP (ref 80–100)
MONOCYTES NFR BLD AUTO: 9.4 % — SIGNIFICANT CHANGE UP (ref 2–14)
NEUTROPHILS NFR BLD AUTO: 70.2 % — SIGNIFICANT CHANGE UP (ref 43–77)
PLATELET # BLD AUTO: 269 K/UL — SIGNIFICANT CHANGE UP (ref 150–400)
POTASSIUM SERPL-MCNC: 4.2 MMOL/L — SIGNIFICANT CHANGE UP (ref 3.5–5.3)
POTASSIUM SERPL-SCNC: 4.2 MMOL/L — SIGNIFICANT CHANGE UP (ref 3.5–5.3)
RBC # BLD: 5.28 M/UL — SIGNIFICANT CHANGE UP (ref 4.2–5.8)
RBC # FLD: 14 % — SIGNIFICANT CHANGE UP (ref 10.3–16.9)
SODIUM SERPL-SCNC: 137 MMOL/L — SIGNIFICANT CHANGE UP (ref 135–145)
SPECIMEN SOURCE: SIGNIFICANT CHANGE UP
TSH SERPL-MCNC: 1.38 UIU/ML — SIGNIFICANT CHANGE UP (ref 0.35–4.94)
WBC # BLD: 10.7 K/UL — HIGH (ref 3.8–10.5)
WBC # FLD AUTO: 10.7 K/UL — HIGH (ref 3.8–10.5)

## 2017-10-05 PROCEDURE — 99239 HOSP IP/OBS DSCHRG MGMT >30: CPT

## 2017-10-05 PROCEDURE — 86803 HEPATITIS C AB TEST: CPT

## 2017-10-05 PROCEDURE — 71046 X-RAY EXAM CHEST 2 VIEWS: CPT

## 2017-10-05 PROCEDURE — 84443 ASSAY THYROID STIM HORMONE: CPT

## 2017-10-05 PROCEDURE — 87075 CULTR BACTERIA EXCEPT BLOOD: CPT

## 2017-10-05 PROCEDURE — 96375 TX/PRO/DX INJ NEW DRUG ADDON: CPT | Mod: XU

## 2017-10-05 PROCEDURE — 87186 SC STD MICRODIL/AGAR DIL: CPT

## 2017-10-05 PROCEDURE — 83735 ASSAY OF MAGNESIUM: CPT

## 2017-10-05 PROCEDURE — 83605 ASSAY OF LACTIC ACID: CPT

## 2017-10-05 PROCEDURE — 73630 X-RAY EXAM OF FOOT: CPT

## 2017-10-05 PROCEDURE — 85025 COMPLETE CBC W/AUTO DIFF WBC: CPT

## 2017-10-05 PROCEDURE — 80048 BASIC METABOLIC PNL TOTAL CA: CPT

## 2017-10-05 PROCEDURE — 85610 PROTHROMBIN TIME: CPT

## 2017-10-05 PROCEDURE — 83036 HEMOGLOBIN GLYCOSYLATED A1C: CPT

## 2017-10-05 PROCEDURE — 99285 EMERGENCY DEPT VISIT HI MDM: CPT | Mod: 25

## 2017-10-05 PROCEDURE — 10061 I&D ABSCESS COMP/MULTIPLE: CPT

## 2017-10-05 PROCEDURE — 87389 HIV-1 AG W/HIV-1&-2 AB AG IA: CPT

## 2017-10-05 PROCEDURE — 36415 COLL VENOUS BLD VENIPUNCTURE: CPT

## 2017-10-05 PROCEDURE — 86140 C-REACTIVE PROTEIN: CPT

## 2017-10-05 PROCEDURE — 87205 SMEAR GRAM STAIN: CPT

## 2017-10-05 PROCEDURE — 85652 RBC SED RATE AUTOMATED: CPT

## 2017-10-05 PROCEDURE — 85730 THROMBOPLASTIN TIME PARTIAL: CPT

## 2017-10-05 PROCEDURE — 96374 THER/PROPH/DIAG INJ IV PUSH: CPT | Mod: XU

## 2017-10-05 PROCEDURE — 87184 SC STD DISK METHOD PER PLATE: CPT

## 2017-10-05 PROCEDURE — 80053 COMPREHEN METABOLIC PANEL: CPT

## 2017-10-05 PROCEDURE — 87070 CULTURE OTHR SPECIMN AEROBIC: CPT

## 2017-10-05 RX ORDER — LEVOTHYROXINE SODIUM 125 MCG
1 TABLET ORAL
Qty: 5 | Refills: 0 | OUTPATIENT
Start: 2017-10-05 | End: 2017-10-10

## 2017-10-05 RX ORDER — NICOTINE POLACRILEX 2 MG
1 GUM BUCCAL
Qty: 1 | Refills: 0 | OUTPATIENT
Start: 2017-10-05 | End: 2017-11-04

## 2017-10-05 RX ORDER — NICOTINE POLACRILEX 2 MG
1 GUM BUCCAL
Qty: 1 | Refills: 0
Start: 2017-10-05 | End: 2017-11-04

## 2017-10-05 RX ORDER — KETOCONAZOLE 20 MG/G
1 AEROSOL, FOAM TOPICAL DAILY
Qty: 0 | Refills: 0 | Status: DISCONTINUED | OUTPATIENT
Start: 2017-10-05 | End: 2017-10-05

## 2017-10-05 RX ORDER — LEVOTHYROXINE SODIUM 125 MCG
1 TABLET ORAL
Qty: 0 | Refills: 0 | COMMUNITY

## 2017-10-05 RX ORDER — LEVOTHYROXINE SODIUM 125 MCG
1 TABLET ORAL
Qty: 30 | Refills: 0
Start: 2017-10-05 | End: 2017-11-04

## 2017-10-05 RX ORDER — LEVOTHYROXINE SODIUM 125 MCG
1 TABLET ORAL
Qty: 30 | Refills: 0 | OUTPATIENT
Start: 2017-10-05 | End: 2017-11-04

## 2017-10-05 RX ADMIN — Medication 1 MILLIGRAM(S): at 12:10

## 2017-10-05 RX ADMIN — Medication 100 MILLIGRAM(S): at 00:46

## 2017-10-05 RX ADMIN — Medication 150 MICROGRAM(S): at 05:16

## 2017-10-05 RX ADMIN — KETOCONAZOLE 1 APPLICATION(S): 20 AEROSOL, FOAM TOPICAL at 05:46

## 2017-10-05 RX ADMIN — Medication 100 MILLIGRAM(S): at 12:10

## 2017-10-05 RX ADMIN — Medication 1 PATCH: at 12:09

## 2017-10-05 RX ADMIN — Medication 1 TABLET(S): at 12:10

## 2017-10-05 RX ADMIN — Medication 100 MILLIGRAM(S): at 07:42

## 2017-10-05 NOTE — DISCHARGE NOTE ADULT - OTHER SIGNIFICANT FINDINGS
52 yo M with PMH significant for hypothyroidism, polysubstance abuse recurrent cellulitis, presented to [facility] for right foot swelling/pain for 4 days duration. Patient initially presented 2 days prior to admission and diagnosed with cellulitis and sent home on Bactrim. Pt took medication as prescribed, but noticed swelling continued to worsen. Foot swelling, erythema, induration with abscess formation. Denies fevers, chills, nausea, vomiting. Patient noted to have uncontrolled tinea pedis for which he states he scratched at the cracks in his R foot. Of note, patient also reports new kitten at home for which there is some scratching of other foot. In ED vitals stable, as patient afebrile, no tachycardia/tachypnea. Patient was evaluated by Podiatry team and s/p I&D of abscess. Patient initiated on vancomycin IV but developed wheal/itching for which the dose was stopped prematurely. Patient started on Clindamycin IV 900mg x1. Benadryl 25mg IV x1. Toradol 30mg x1 for pain. Patient admitted to general medicine floor and continued on IV clindamycin. Infection continued to improve and cultures remain pending. Patient determined stable for discharge with PO clindamycin and follow up appointments made as follows:   [facility] on Monday 10/9/17 at 10AM at [address] [place], [place] [address]  Dr. Sainz on 10/12/17 Thursday at 10:30AM at [facility] located at [address] [facility] 407

## 2017-10-05 NOTE — DISCHARGE NOTE ADULT - ADDITIONAL INSTRUCTIONS
Antibiotics have been sent to your pharmacy and please take as directed. The following appointments have also been made:  [facility] on Monday 10/9/17 at 10AM at [address] [place] [id number]  Dr. Sainz on 10/12/17 Thursday at 10:30AM at [place] location located at [address]    Please make sure to follow up with these appointments as it is important to assure improvement of infection and follow up on cultures from drainage of abscess site. Antibiotics have been sent to your pharmacy and please take as directed. The following appointments have also been made:  [facility] on Monday 10/9/17 at 10AM at [address] [place] [address], [place] [address]  Dr. Sainz on 10/12/17 Thursday at 10:30AM at [place] location located at [address]. Please consider outpatient abdominal ultrasound for left sided abdominal mass.    Please make sure to follow up with these appointments as it is important to assure improvement of infection and follow up on cultures from drainage of abscess site. Antibiotic Clindamycin 450mg (3 tablets of 150mg) to by taken by mouth 3 times a day have been sent to your pharmacy. Please follow up at the following appointments:  [facility] on Monday 10/9/17 at 10AM at [address] [place] [address], [place] [address]  Dr. Sainz on 10/12/17 Thursday at 10:30AM at [place] location located at [address] [facility] 407. Please consider outpatient abdominal ultrasound for left sided abdominal mass.    Please make sure to follow up with these appointments as it is important to assure improvement of infection and follow up on cultures from drainage of abscess site.

## 2017-10-05 NOTE — DISCHARGE NOTE ADULT - MEDICATION SUMMARY - MEDICATIONS TO TAKE
I will START or STAY ON the medications listed below when I get home from the hospital:    Tinactin 1% topical cream  -- Apply on skin to affected area 3 times a day   -- For external use only.    -- Indication: For Tinea pedis of right foot    levothyroxine 150 mcg (0.15 mg) oral tablet  -- 1 tab(s) by mouth once a day  -- Indication: For Hypothyroid I will START or STAY ON the medications listed below when I get home from the hospital:    Tinactin 1% topical cream  -- Apply on skin to affected area 3 times a day   -- For external use only.    -- Indication: For Tinea pedis of right foot    clindamycin 150 mg oral capsule  -- 3 cap(s) by mouth 3 times a day  -- Indication: For Cellulitis of foot, right    nicotine 7 mg/24 hr transdermal film, extended release  -- 1 patch by transdermal patch once a day   -- Indication: For Smoking Cessation    levothyroxine 150 mcg (0.15 mg) oral tablet  -- 1 tab(s) by mouth once a day  -- Indication: For Hypothyroid

## 2017-10-05 NOTE — PROGRESS NOTE ADULT - ASSESSMENT
52 yo M with PMH of hypothyroidism (s/p radioactive iodine tx), recurrent cellulitis, polysubstance abuse presenting with R foot swelling and pain consistent with cellulitis. Patient initiated on bactrim 2 days prior to admission and failed. Patient s/p I&D and admitted to [facility] for cellulitis.

## 2017-10-05 NOTE — DISCHARGE NOTE ADULT - HOSPITAL COURSE
52 yo M with PMH significant for hypothyroidism, polysubstance abuse recurrent cellulitis, presented to [facility] for right foot swelling/pain for 4 days duration. Patient initially presented 2 days prior to admission and diagnosed with cellulitis and sent home on Bactrim. Pt took medication as prescribed, but noticed swelling continued to worsen. Foot swelling, erythema, induration with abscess formation. Denies fevers, chills, nausea, vomiting. Patient noted to have uncontrolled tinea pedis for which he states he scratched at the cracks in his R foot. Of note, patient also reports new kitten at home for which there is some scratching of other foot. In ED vitals stable, as patient afebrile, no tachycardia/tachypnea. Patient was evaluated by Podiatry team and s/p I&D of abscess. Patient initiated on vancomycin IV but developed wheal/itching for which the dose was stopped prematurely. Patient started on Clindamycin IV 900mg x1. Benadryl 25mg IV x1. Toradol 30mg x1 for pain. Patient admitted to general medicine floor and continued on IV clindamycin. Infection continued to improve and cultures remain pending. Patient determined stable for discharge with PO clindamycin and follow up appointments made as follows:   [facility] on Monday 10/9/17 at 10AM at [address] [place], [place] [address]  Dr. Sainz on 10/12/17 Thursday at 10:30AM at [facility] located at [address] [facility] 407 50 yo M with PMH significant for hypothyroidism, polysubstance abuse recurrent cellulitis, presented to [facility] for right foot swelling/pain for 4 days duration. Patient initially presented 2 days prior to admission and diagnosed with cellulitis and sent home on Bactrim. Pt took medication as prescribed, but noticed swelling continued to worsen. Foot swelling, erythema, induration with abscess formation. Denies fevers, chills, nausea, vomiting. Patient noted to have uncontrolled tinea pedis for which he states he scratched at the cracks in his R foot. Of note, patient also reports new kitten at home for which there is some scratching of other foot. In ED vitals stable, as patient afebrile, no tachycardia/tachypnea. Patient was evaluated by Podiatry team and s/p I&D of abscess. Patient initiated on vancomycin IV but developed wheal/itching for which the dose was stopped prematurely. Patient started on Clindamycin IV 900mg x1. Benadryl 25mg IV x1. Toradol 30mg x1 for pain. Patient admitted to general medicine floor and continued on IV clindamycin. Infection continued to improve and cultures remain pending. On admission, abdominal ultrasound was ordered for ?left abdominal mass, but will defer to outpatient workup as LFTs within normal limits. Patient determined stable for discharge with PO clindamycin and follow up appointments made as follows:   [facility] on Monday 10/9/17 at 10AM at [address] [address]  Dr. Sainz on 10/12/17 Thursday at 10:30AM at [facility] located at [address] [place] [address] 52 yo M with PMH significant for hypothyroidism, polysubstance abuse recurrent cellulitis, presented to [facility] for right foot swelling/pain for 4 days duration. Patient initially presented 2 days prior to admission and diagnosed with cellulitis and sent home on Bactrim. Pt took medication as prescribed, but noticed swelling continued to worsen. c/w  Foot swelling, erythema, induration with abscess formation. Denies fevers, chills, nausea, vomiting. Patient noted to have uncontrolled tinea pedis for which he states he scratched at the cracks in his R foot. Of note, patient also reports new kitten at home for which there is some scratching of other foot.     In ED vitals stable, as patient afebrile, no tachycardia/tachypnea. Patient was evaluated by Podiatry team and s/p I&D of abscess. Patient initiated on vancomycin IV but with onset of infusion, developed wheal/itching for which the dose was stopped prematurely. Patient started on Clindamycin IV 900mg x1. Benadryl 25mg IV x1. Toradol 30mg x1 for pain. Patient admitted to general medicine floor and continued on IV clindamycin. his right foot pain, swelling, redness improved significantly. wound cultures remain pending. On admission, abdominal ultrasound was ordered for ?left abdominal mass, however mass not appreciated on discharge exam.  pt w/o abd pain, anorexia. tolerating POs. nml BMs. LFTs within normal limits. wound nurse was consulted, pt was educated thoroughly on how to cleanse, pack and dress wound qday. he was given iodoform strips, gauze , qtips.  Patient stable for discharge on PO clindamycin and follow up appointments made as follows:  [facility] on Monday 10/9/17 at 10AM at [address] [place] [address]  Dr. Sainz (podiatry)on 10/12/17 Thursday at 10:30AM at [facility] located at [address] [place] [address]

## 2017-10-05 NOTE — DISCHARGE NOTE ADULT - PATIENT PORTAL LINK FT
“You can access the FollowHealth Patient Portal, offered by [facility], by registering with the following website: [email or web address]”

## 2017-10-05 NOTE — DISCHARGE NOTE ADULT - SECONDARY DIAGNOSIS.
Tinea pedis of right foot Acquired hypothyroidism Abdominal mass, unspecified abdominal location Polysubstance abuse Sepsis affecting skin

## 2017-10-05 NOTE — DISCHARGE NOTE ADULT - PROVIDER TOKENS
FREE:[LAST:[[facility]],PHONE:[phone]],FAX:[(   )    -],ADDRESS:[[address] [place] [address], [address] [place] [address]]],FREE:[LAST:[Emeli],FIRST:[José Luis],PHONE:[phone]],FAX:[(   )    -],ADDRESS:[[address] [place] [address]]]

## 2017-10-05 NOTE — DISCHARGE NOTE ADULT - MEDICATION SUMMARY - MEDICATIONS TO CHANGE
I will SWITCH the dose or number of times a day I take the medications listed below when I get home from the hospital:    Synthroid  -- 0.2 milligram(s) by mouth once a day    Synthroid 200 mcg (0.2 mg) oral tablet  -- 1 tab(s) by mouth once a day  -- It is very important that you take or use this exactly as directed.  Do not skip doses or discontinue unless directed by your doctor.  Medication should be taken with plenty of water.  Some non-prescription drugs may aggravate your condition.  Read all labels carefully.  If a warning appears, check with your doctor before taking.  Take medication on an empty stomach 1 hour before or 2 to 3 hours after a meal unless otherwise directed by your doctor.    levothyroxine 200 mcg (0.2 mg) oral tablet  -- 1 tab(s) by mouth once a day  -- It is very important that you take or use this exactly as directed.  Do not skip doses or discontinue unless directed by your doctor.  Medication should be taken with plenty of water.  Some non-prescription drugs may aggravate your condition.  Read all labels carefully.  If a warning appears, check with your doctor before taking.  Take medication on an empty stomach 1 hour before or 2 to 3 hours after a meal unless otherwise directed by your doctor.    levothyroxine 200 mcg (0.2 mg) oral tablet  -- 1 tab(s) by mouth once a day  -- It is very important that you take or use this exactly as directed.  Do not skip doses or discontinue unless directed by your doctor.  Medication should be taken with plenty of water.  Some non-prescription drugs may aggravate your condition.  Read all labels carefully.  If a warning appears, check with your doctor before taking.  Take medication on an empty stomach 1 hour before or 2 to 3 hours after a meal unless otherwise directed by your doctor. I will SWITCH the dose or number of times a day I take the medications listed below when I get home from the hospital:  None

## 2017-10-05 NOTE — DISCHARGE NOTE ADULT - PLAN OF CARE
Discharge Home. Improve infection. You were found to have an infection of the soft tissues of the foot. The infection was drained and evaluated by podiatry specialists. You were started on IV anitbiotics with demonstrated improvement. You will need to follow up with podiatry as well as the [facility] team for further outpatient follow up, as cultures are still pending. You will be discharged on the antibiotic Clindamycin which has been sent to your pharmacy and please take as directed. The following appointments have been made for you:   Dr. Sainz on Thursday 10/12/17 at 10:30AM at [facility] location [address] [place] [address]- please call [phone] for further information.  [facility] for Monday 10/9/17 at 10AM at [address] [place] [address], [address] [place] [address] Tinactin has been sent to your pharmacy to improve control of your athletes foot. This was likely a course of your infection and improving control can better prevent further infections. Continue with home synthroid dose of 150 mcg by mouth. You will need to follow up with your primary doctor- which you may establish further at your [facility] appointment made for:  Monday 10/9/17 at 10AM at [address] [place] [address], [address] [place] [address]. Incidental finding- please follow up with your primary doctor for further evaluation. Please follow up with your primary doctor for which you can obtain further social support- which can be further established at [facility] (appointment made for Monday 10/9/17 at 10AM at [address] [facility] [facility] [address]).

## 2017-10-05 NOTE — PROGRESS NOTE ADULT - SUBJECTIVE AND OBJECTIVE BOX
PROGRESS NOTE  CC: R foot pain/swelling  Overnight Events: Admitted yesterday evening. FRANCIA overnight. Afebrile, VSS.    Interval History: No complaints currently. Denies foot pain at this time with improved swelling. Denies numbness/tingling, decreased sensation in lower extremities. Denies itching currently.     ROS: Denies headaches, dizziness cough, congestion, shortness of breath, nausea, vomiting, abdominal pain.    OBJECTIVE  PHYSICAL EXAM:  T(C): 36.4 (10-05-17 @ 05:31), Max: 37 (10-04-17 @ 16:30)  HR: 69 (10-05-17 @ 05:31) (69 - 92)  BP: 149/92 (10-05-17 @ 05:31) (131/84 - 167/95)  RR: 18 (10-05-17 @ 05:31) (16 - 18)  SpO2: 95% (10-05-17 @ 05:31) (95% - 98%)  Wt(kg): --    I&O's Summary    04 Oct 2017 07:01  -  05 Oct 2017 07:00  --------------------------------------------------------  IN: 1000 mL / OUT: 0 mL / NET: 1000 mL        Appearance: NAD. Speaking in full sentences.   HEENT:   Conjunctiva clear b/l. Moist oral mucosa.  Cardiovascular: RRR with no murmurs.  Respiratory: Lungs CTAB.   Gastrointestinal:  Soft, nontender. Non-distended. Non-rigid.	  Extremities: No edema b/l. No erythema b/l. LE WWP b/l.  Vascular: DP 2+ b/l.  Neurologic:  Alert and awake. Moving all extremities. Following commands. Making eye contact.  	  LABS:                        17.1   10.7  )-----------( 269      ( 05 Oct 2017 06:53 )             49.0     10-05    137  |  102  |  15  ----------------------------<  100<H>  4.2   |  21<L>  |  1.10    Ca    9.2      05 Oct 2017 06:53  Mg     2.2     10-05    TPro  8.2  /  Alb  4.7  /  TBili  1.0  /  DBili  x   /  AST  32  /  ALT  46<H>  /  AlkPhos  70  10-04    PT/INR - ( 04 Oct 2017 14:24 )   PT: 11.4 sec;   INR: 1.03          PTT - ( 04 Oct 2017 14:24 )  PTT:39.8 sec      RADIOLOGY & ADDITIONAL TESTS:  Reviewed by myself and with medical team.    MEDICATIONS  (STANDING):  clindamycin IVPB      clindamycin IVPB 600 milliGRAM(s) IV Intermittent every 8 hours  folic acid 1 milliGRAM(s) Oral daily  influenza   Vaccine 0.5 milliLiter(s) IntraMuscular once  ketoconazole 2% Cream 1 Application(s) Topical daily  levothyroxine 150 MICROGram(s) Oral daily  multivitamin 1 Tablet(s) Oral daily  nicotine -   7 mG/24Hr(s) Patch 1 patch Transdermal daily  thiamine 100 milliGRAM(s) Oral daily    MEDICATIONS  (PRN):      ASSESSMENT/PLAN: PROGRESS NOTE  CC: R foot pain/swelling  Overnight Events: Admitted yesterday evening. FRANCIA overnight. Afebrile, VSS.    Interval History: No complaints currently. Denies foot pain at this time with improved swelling. Denies numbness/tingling, decreased sensation in lower extremities. Denies itching currently.     ROS: Denies headaches, dizziness cough, congestion, shortness of breath, nausea, vomiting, abdominal pain.    OBJECTIVE  PHYSICAL EXAM:  T(C): 36.4 (10-05-17 @ 05:31), Max: 37 (10-04-17 @ 16:30)  HR: 69 (10-05-17 @ 05:31) (69 - 92)  BP: 149/92 (10-05-17 @ 05:31) (131/84 - 167/95)  RR: 18 (10-05-17 @ 05:31) (16 - 18)  SpO2: 95% (10-05-17 @ 05:31) (95% - 98%)  Wt(kg): --    I&O's Summary    04 Oct 2017 07:01  -  05 Oct 2017 07:00  --------------------------------------------------------  IN: 1000 mL / OUT: 0 mL / NET: 1000 mL        Appearance: NAD. Speaking in full sentences.   HEENT:   Conjunctiva clear b/l. Moist oral mucosa.  Cardiovascular: RRR with no murmurs.  Respiratory: Lungs CTAB.   Gastrointestinal:  Soft, nontender. Non-distended. Non-rigid.	  Extremities: No edema b/l. No erythema b/l. LE WWP b/l. R foot bandaged, clean, mild erythema, mild swelling. 2+ pedal pulses bilaterally.   Vascular: DP 2+ b/l.  Neurologic:  Alert and awake oriented x3. Moving all extremities- 5/5 strength in upper- , elbow flexion/extension bilaterally. 5/5 strength in lower extremity- hip flexion, plantar/dorsiflexion of ankle bilaterally. Sensation intact in upper and lower extremities b/l. CNII-XII. Following commands. Making eye contact.  	  LABS:                        17.1   10.7  )-----------( 269      ( 05 Oct 2017 06:53 )             49.0     10-05    137  |  102  |  15  ----------------------------<  100<H>  4.2   |  21<L>  |  1.10    Ca    9.2      05 Oct 2017 06:53  Mg     2.2     10-05    TPro  8.2  /  Alb  4.7  /  TBili  1.0  /  DBili  x   /  AST  32  /  ALT  46<H>  /  AlkPhos  70  10-04    PT/INR - ( 04 Oct 2017 14:24 )   PT: 11.4 sec;   INR: 1.03          PTT - ( 04 Oct 2017 14:24 )  PTT:39.8 sec      RADIOLOGY & ADDITIONAL TESTS:  Reviewed by myself and with medical team.    MEDICATIONS  (STANDING):  clindamycin IVPB      clindamycin IVPB 600 milliGRAM(s) IV Intermittent every 8 hours  folic acid 1 milliGRAM(s) Oral daily  influenza   Vaccine 0.5 milliLiter(s) IntraMuscular once  ketoconazole 2% Cream 1 Application(s) Topical daily  levothyroxine 150 MICROGram(s) Oral daily  multivitamin 1 Tablet(s) Oral daily  nicotine -   7 mG/24Hr(s) Patch 1 patch Transdermal daily  thiamine 100 milliGRAM(s) Oral daily    MEDICATIONS  (PRN):      ASSESSMENT/PLAN:

## 2017-10-05 NOTE — DISCHARGE NOTE ADULT - CARE PROVIDER_API CALL
[facility]   [address], [address] [place] [address]  Phone: [phone]  Fax: (   )    -    José Luis Sainz  [address] [place] [address]  Phone: [phone]  Fax: (   )    -

## 2017-10-05 NOTE — DISCHARGE NOTE ADULT - CARE PLAN
Principal Discharge DX:	Cellulitis of foot, right  Goal:	Discharge Home. Improve infection.  Instructions for follow-up, activity and diet:	You were found to have an infection of the soft tissues of the foot. The infection was drained and evaluated by podiatry specialists. You were started on IV anitbiotics with demonstrated improvement. You will need to follow up with podiatry as well as the [facility] team for further outpatient follow up, as cultures are still pending. You will be discharged on the antibiotic Clindamycin which has been sent to your pharmacy and please take as directed. The following appointments have been made for you:   Dr. Sainz on Thursday 10/12/17 at 10:30AM at [facility] location [address]- please call [phone] for further information.  [facility] for Monday 10/9/17 at 10AM at [address] [place] [address], [address] [place] [address]  Secondary Diagnosis:	Tinea pedis of right foot  Instructions for follow-up, activity and diet:	Tinactin has been sent to your pharmacy to improve control of your athletes foot. This was likely a course of your infection and improving control can better prevent further infections.  Secondary Diagnosis:	Acquired hypothyroidism  Instructions for follow-up, activity and diet:	Continue with home synthroid dose of 150 mcg by mouth. You will need to follow up with your primary doctor- which you may establish further at your [facility] appointment made for:  Monday 10/9/17 at 10AM at [address] [place] [address].  Secondary Diagnosis:	Abdominal mass, unspecified abdominal location  Instructions for follow-up, activity and diet:	Incidental finding- please follow up with your primary doctor for further evaluation.  Secondary Diagnosis:	Polysubstance abuse  Instructions for follow-up, activity and diet:	Please follow up with your primary doctor for which you can obtain further social support- which can be further established at [facility] (appointment made for Monday 10/9/17 at 10AM at [address] [place] [address]). Principal Discharge DX:	Cellulitis of foot, right  Goal:	Discharge Home. Improve infection.  Instructions for follow-up, activity and diet:	You were found to have an infection of the soft tissues of the foot. The infection was drained and evaluated by podiatry specialists. You were started on IV anitbiotics with demonstrated improvement. You will need to follow up with podiatry as well as the [facility] team for further outpatient follow up, as cultures are still pending. You will be discharged on the antibiotic Clindamycin which has been sent to your pharmacy and please take as directed. The following appointments have been made for you:   Dr. Sainz on Thursday 10/12/17 at 10:30AM at [facility] location [address]- please call [phone] for further information.  [facility] for Monday 10/9/17 at 10AM at [address] [place] [address], [address] [place] [address]  Secondary Diagnosis:	Tinea pedis of right foot  Instructions for follow-up, activity and diet:	Tinactin has been sent to your pharmacy to improve control of your athletes foot. This was likely a course of your infection and improving control can better prevent further infections.  Secondary Diagnosis:	Acquired hypothyroidism  Instructions for follow-up, activity and diet:	Continue with home synthroid dose of 150 mcg by mouth. You will need to follow up with your primary doctor- which you may establish further at your [facility] appointment made for:  Monday 10/9/17 at 10AM at [address] [place] [address].  Secondary Diagnosis:	Abdominal mass, unspecified abdominal location  Instructions for follow-up, activity and diet:	Incidental finding- please follow up with your primary doctor for further evaluation.  Secondary Diagnosis:	Polysubstance abuse  Instructions for follow-up, activity and diet:	Please follow up with your primary doctor for which you can obtain further social support- which can be further established at [facility] (appointment made for Monday 10/9/17 at 10AM at [address] [place] [address]).  Secondary Diagnosis:	Sepsis affecting skin

## 2017-10-05 NOTE — PROGRESS NOTE ADULT - SUBJECTIVE AND OBJECTIVE BOX
Pt notes feeling better today. Denies any foot pain     ICU Vital Signs Last 24 Hrs  T(C): 36.4 (05 Oct 2017 05:31), Max: 37 (04 Oct 2017 16:30)  T(F): 97.6 (05 Oct 2017 05:31), Max: 98.6 (04 Oct 2017 16:30)  HR: 69 (05 Oct 2017 05:31) (69 - 92)  BP: 149/92 (05 Oct 2017 05:31) (131/84 - 167/95)  BP(mean): --  ABP: --  ABP(mean): --  RR: 18 (05 Oct 2017 05:31) (16 - 18)  SpO2: 95% (05 Oct 2017 05:31) (95% - 98%)                          17.1   10.7  )-----------( 269      ( 05 Oct 2017 06:53 )             49.0     10-05    137  |  102  |  15  ----------------------------<  100<H>  4.2   |  21<L>  |  1.10    Ca    9.2      05 Oct 2017 06:53  Mg     2.2     10-05    TPro  8.2  /  Alb  4.7  /  TBili  1.0  /  DBili  x   /  AST  32  /  ALT  46<H>  /  AlkPhos  70  10-04    < from: Xray Foot AP + Lateral + Oblique, Right (10.04.17 @ 14:53) >  EXAM:  XR FOOT-RIGHT MIN 3 VIEWS                          PROCEDURE DATE:  10/04/2017                INTERPRETATION:  INDICATION: forefoot abscess suspected    TECHNIQUE: Right foot, 3 nonweightbearing views    COMPARISON: No prior exams are available for comparison at this time.    FINDINGS:  There is mild soft tissue swelling over the dorsum forefoot. No   subcutaneous gas density or radiopaque foreign body seen. Osseous   structures are intact. There are no bony erosions or aggressive   periostitis. Joint spaces are preserved and anatomically aligned. A   prominent os tibialis externum is noted.      IMPRESSION:   Soft tissue swelling over the dorsum forefoot, without radiographic   evidence to suggest osteomyelitis. If there is persistent clinical   concern for osteomyelitis, consider MR imaging for further evaluation.    Osseous structures are intact.      "Thank you for the opportunity to participate in the care of this   patient."  ROBERTO PITTS M.D., ATTENDINGRADIOLOGIST  This document has been electronically signed. Oct  4 2017  5:10PM    < end of copied text >      PE:   GEN: Patient is a 51y well developed, well nourished Male, alert, awake and oriented to person, place and time in no acute distress.   Extremities   Vascular:   DP/PT palpable BL, cft brisk, + calor to R foot improving, + erythema from forefoot improving,  incision site right foot 4th interspace, between 4th and 5th metatarsal heads noted s/p bedside I&D. tender to palpation, no crepitus, no drainage, no purulence.  Derm: interdigital maceration improving between 4th and 5th toes.   Neuro:  sensation intact  bilateral   MSK:  muscle power 5/5 for all crural compartments, able to wiggle all toes       Assessment and Recommendation:   · Assessment		  A/P:  51yMale presents with Hx of tinea pedis and R foot recurrent cellulitis presents with abscess and cellulitis of R foot   - 1 day s/p bedside incision and drainage in ER ~ 3cc of purulence expressed. Wound improving today  - Continue IV abx    - Recommend tinactin for antifungal treatment to b/l feet  - f/u cultures   - pack with gauze and DSD   - weight bearing as tolerated   -Will discuss with attending

## 2017-10-05 NOTE — DISCHARGE NOTE ADULT - MEDICATION SUMMARY - MEDICATIONS TO STOP TAKING
I will STOP taking the medications listed below when I get home from the hospital:    Bactrim  mg-160 mg oral tablet  -- 1 tab(s) by mouth 2 times a day  -- Avoid prolonged or excessive exposure to direct and/or artificial sunlight while taking this medication.  Finish all this medication unless otherwise directed by prescriber.  Medication should be taken with plenty of water.    ibuprofen 800 mg oral tablet  -- 1 tab(s) by mouth every 8 hours  -- Do not take this drug if you are pregnant.  It is very important that you take or use this exactly as directed.  Do not skip doses or discontinue unless directed by your doctor.  May cause drowsiness or dizziness.  Obtain medical advice before taking any non-prescription drugs as some may affect the action of this medication.  Take with food or milk.    cephalexin 500 mg oral capsule  -- 1 cap(s) by mouth 4 times a day  -- Finish all this medication unless otherwise directed by prescriber.    Bactrim  mg-160 mg oral tablet  -- 1 tab(s) by mouth 2 times a day  -- Avoid prolonged or excessive exposure to direct and/or artificial sunlight while taking this medication.  Finish all this medication unless otherwise directed by prescriber.  Medication should be taken with plenty of water.    cephalexin 500 mg oral capsule  -- 1 cap(s) by mouth every 6 hours  -- Finish all this medication unless otherwise directed by prescriber.    Bactrim  mg-160 mg oral tablet  -- 1 tab(s) by mouth 2 times a day  -- Avoid prolonged or excessive exposure to direct and/or artificial sunlight while taking this medication.  Finish all this medication unless otherwise directed by prescriber.  Medication should be taken with plenty of water.    cephalexin 500 mg oral tablet  -- 1 tab(s) by mouth 2 times a day  -- Finish all this medication unless otherwise directed by prescriber.    Keflex 500 mg oral capsule  -- 1 cap(s) by mouth 4 times a day  -- Finish all this medication unless otherwise directed by prescriber.    Bactrim  mg-160 mg oral tablet  -- 1 tab(s) by mouth 2 times a day  -- Avoid prolonged or excessive exposure to direct and/or artificial sunlight while taking this medication.  Finish all this medication unless otherwise directed by prescriber.  Medication should be taken with plenty of water.    Lotrimin AF Cream 1% topical cream  -- Apply on skin to affected area 2 times a day  -- For external use only.    Keflex 500 mg oral capsule  -- 1 cap(s) by mouth 4 times a day  -- Finish all this medication unless otherwise directed by prescriber.    Bactrim  mg-160 mg oral tablet  -- 1 tab(s) by mouth 2 times a day  -- Avoid prolonged or excessive exposure to direct and/or artificial sunlight while taking this medication.  Finish all this medication unless otherwise directed by prescriber.  Medication should be taken with plenty of water.    methocarbamol 500 mg oral tablet  -- 1 tab(s) by mouth every 8 hours, As Needed -for muscle spasm  -- May cause drowsiness.  Alcohol may intensify this effect.  Use care when operating dangerous machinery.    Bactrim  mg-160 mg oral tablet  -- 1 tab(s) by mouth 2 times a day  -- Avoid prolonged or excessive exposure to direct and/or artificial sunlight while taking this medication.  Finish all this medication unless otherwise directed by prescriber.  Medication should be taken with plenty of water.    Keflex 500 mg oral capsule  -- 1 cap(s) by mouth 4 times a day  -- Finish all this medication unless otherwise directed by prescriber.    Lotrimin AF Cream 1% topical cream  -- Apply on skin to affected area 2 times a day  -- For external use only.    Bactrim  mg-160 mg oral tablet  -- 1 tab(s) by mouth 2 times a day  -- Avoid prolonged or excessive exposure to direct and/or artificial sunlight while taking this medication.  Finish all this medication unless otherwise directed by prescriber.  Medication should be taken with plenty of water.    Bactrim  mg-160 mg oral tablet  -- 1 tab(s) by mouth 2 times a day  -- Avoid prolonged or excessive exposure to direct and/or artificial sunlight while taking this medication.  Finish all this medication unless otherwise directed by prescriber.  Medication should be taken with plenty of water.    Bactrim  mg-160 mg oral tablet  -- 1 tab(s) by mouth 2 times a day  -- Avoid prolonged or excessive exposure to direct and/or artificial sunlight while taking this medication.  Finish all this medication unless otherwise directed by prescriber.  Medication should be taken with plenty of water.    clotrimazole 1% topical cream  -- Apply on skin to affected area 2 times a day x 14 days  -- For external use only. I will STOP taking the medications listed below when I get home from the hospital:    Synthroid  -- 0.2 milligram(s) by mouth once a day    Bactrim  mg-160 mg oral tablet  -- 1 tab(s) by mouth 2 times a day  -- Avoid prolonged or excessive exposure to direct and/or artificial sunlight while taking this medication.  Finish all this medication unless otherwise directed by prescriber.  Medication should be taken with plenty of water.    ibuprofen 800 mg oral tablet  -- 1 tab(s) by mouth every 8 hours  -- Do not take this drug if you are pregnant.  It is very important that you take or use this exactly as directed.  Do not skip doses or discontinue unless directed by your doctor.  May cause drowsiness or dizziness.  Obtain medical advice before taking any non-prescription drugs as some may affect the action of this medication.  Take with food or milk.    cephalexin 500 mg oral capsule  -- 1 cap(s) by mouth 4 times a day  -- Finish all this medication unless otherwise directed by prescriber.    Bactrim  mg-160 mg oral tablet  -- 1 tab(s) by mouth 2 times a day  -- Avoid prolonged or excessive exposure to direct and/or artificial sunlight while taking this medication.  Finish all this medication unless otherwise directed by prescriber.  Medication should be taken with plenty of water.    cephalexin 500 mg oral capsule  -- 1 cap(s) by mouth every 6 hours  -- Finish all this medication unless otherwise directed by prescriber.    Bactrim  mg-160 mg oral tablet  -- 1 tab(s) by mouth 2 times a day  -- Avoid prolonged or excessive exposure to direct and/or artificial sunlight while taking this medication.  Finish all this medication unless otherwise directed by prescriber.  Medication should be taken with plenty of water.    cephalexin 500 mg oral tablet  -- 1 tab(s) by mouth 2 times a day  -- Finish all this medication unless otherwise directed by prescriber.    Synthroid 200 mcg (0.2 mg) oral tablet  -- 1 tab(s) by mouth once a day  -- It is very important that you take or use this exactly as directed.  Do not skip doses or discontinue unless directed by your doctor.  Medication should be taken with plenty of water.  Some non-prescription drugs may aggravate your condition.  Read all labels carefully.  If a warning appears, check with your doctor before taking.  Take medication on an empty stomach 1 hour before or 2 to 3 hours after a meal unless otherwise directed by your doctor.    Keflex 500 mg oral capsule  -- 1 cap(s) by mouth 4 times a day  -- Finish all this medication unless otherwise directed by prescriber.    Bactrim  mg-160 mg oral tablet  -- 1 tab(s) by mouth 2 times a day  -- Avoid prolonged or excessive exposure to direct and/or artificial sunlight while taking this medication.  Finish all this medication unless otherwise directed by prescriber.  Medication should be taken with plenty of water.    Lotrimin AF Cream 1% topical cream  -- Apply on skin to affected area 2 times a day  -- For external use only.    Keflex 500 mg oral capsule  -- 1 cap(s) by mouth 4 times a day  -- Finish all this medication unless otherwise directed by prescriber.    Bactrim  mg-160 mg oral tablet  -- 1 tab(s) by mouth 2 times a day  -- Avoid prolonged or excessive exposure to direct and/or artificial sunlight while taking this medication.  Finish all this medication unless otherwise directed by prescriber.  Medication should be taken with plenty of water.    levothyroxine 200 mcg (0.2 mg) oral tablet  -- 1 tab(s) by mouth once a day  -- It is very important that you take or use this exactly as directed.  Do not skip doses or discontinue unless directed by your doctor.  Medication should be taken with plenty of water.  Some non-prescription drugs may aggravate your condition.  Read all labels carefully.  If a warning appears, check with your doctor before taking.  Take medication on an empty stomach 1 hour before or 2 to 3 hours after a meal unless otherwise directed by your doctor.    methocarbamol 500 mg oral tablet  -- 1 tab(s) by mouth every 8 hours, As Needed -for muscle spasm  -- May cause drowsiness.  Alcohol may intensify this effect.  Use care when operating dangerous machinery.    Bactrim  mg-160 mg oral tablet  -- 1 tab(s) by mouth 2 times a day  -- Avoid prolonged or excessive exposure to direct and/or artificial sunlight while taking this medication.  Finish all this medication unless otherwise directed by prescriber.  Medication should be taken with plenty of water.    Keflex 500 mg oral capsule  -- 1 cap(s) by mouth 4 times a day  -- Finish all this medication unless otherwise directed by prescriber.    Lotrimin AF Cream 1% topical cream  -- Apply on skin to affected area 2 times a day  -- For external use only.    levothyroxine 200 mcg (0.2 mg) oral tablet  -- 1 tab(s) by mouth once a day  -- It is very important that you take or use this exactly as directed.  Do not skip doses or discontinue unless directed by your doctor.  Medication should be taken with plenty of water.  Some non-prescription drugs may aggravate your condition.  Read all labels carefully.  If a warning appears, check with your doctor before taking.  Take medication on an empty stomach 1 hour before or 2 to 3 hours after a meal unless otherwise directed by your doctor.    Bactrim  mg-160 mg oral tablet  -- 1 tab(s) by mouth 2 times a day  -- Avoid prolonged or excessive exposure to direct and/or artificial sunlight while taking this medication.  Finish all this medication unless otherwise directed by prescriber.  Medication should be taken with plenty of water.    Bactrim  mg-160 mg oral tablet  -- 1 tab(s) by mouth 2 times a day  -- Avoid prolonged or excessive exposure to direct and/or artificial sunlight while taking this medication.  Finish all this medication unless otherwise directed by prescriber.  Medication should be taken with plenty of water.    levothyroxine 150 mcg (0.15 mg) oral tablet  -- 1 tab(s) by mouth once a day  -- It is very important that you take or use this exactly as directed.  Do not skip doses or discontinue unless directed by your doctor.  Medication should be taken with plenty of water.  Some non-prescription drugs may aggravate your condition.  Read all labels carefully.  If a warning appears, check with your doctor before taking.  Take medication on an empty stomach 1 hour before or 2 to 3 hours after a meal unless otherwise directed by your doctor.    Bactrim  mg-160 mg oral tablet  -- 1 tab(s) by mouth 2 times a day  -- Avoid prolonged or excessive exposure to direct and/or artificial sunlight while taking this medication.  Finish all this medication unless otherwise directed by prescriber.  Medication should be taken with plenty of water.    clotrimazole 1% topical cream  -- Apply on skin to affected area 2 times a day x 14 days  -- For external use only.

## 2017-10-06 LAB
-  AMPICILLIN/SULBACTAM: SIGNIFICANT CHANGE UP
-  AMPICILLIN: SIGNIFICANT CHANGE UP
-  AMPICILLIN: SIGNIFICANT CHANGE UP
-  CEFAZOLIN: SIGNIFICANT CHANGE UP
-  CEFTRIAXONE: SIGNIFICANT CHANGE UP
-  CIPROFLOXACIN: SIGNIFICANT CHANGE UP
-  CLINDAMYCIN: SIGNIFICANT CHANGE UP
-  ERYTHROMYCIN: SIGNIFICANT CHANGE UP
-  GENTAMICIN: SIGNIFICANT CHANGE UP
-  PENICILLIN: SIGNIFICANT CHANGE UP
-  PIPERACILLIN/TAZOBACTAM: SIGNIFICANT CHANGE UP
-  TOBRAMYCIN: SIGNIFICANT CHANGE UP
-  TRIMETHOPRIM/SULFAMETHOXAZOLE: SIGNIFICANT CHANGE UP
-  VANCOMYCIN: SIGNIFICANT CHANGE UP
CULTURE RESULTS: SIGNIFICANT CHANGE UP
METHOD TYPE: SIGNIFICANT CHANGE UP
METHOD TYPE: SIGNIFICANT CHANGE UP
ORGANISM # SPEC MICROSCOPIC CNT: SIGNIFICANT CHANGE UP
SPECIMEN SOURCE: SIGNIFICANT CHANGE UP

## 2017-10-09 ENCOUNTER — APPOINTMENT (OUTPATIENT)
Dept: INTERNAL MEDICINE | Facility: CLINIC | Age: 52
End: 2017-10-09
Payer: COMMERCIAL

## 2017-10-09 VITALS
WEIGHT: 192 LBS | SYSTOLIC BLOOD PRESSURE: 152 MMHG | TEMPERATURE: 98.5 F | DIASTOLIC BLOOD PRESSURE: 97 MMHG | HEIGHT: 78 IN | HEART RATE: 69 BPM | OXYGEN SATURATION: 97 % | BODY MASS INDEX: 22.21 KG/M2

## 2017-10-09 DIAGNOSIS — E03.9 HYPOTHYROIDISM, UNSPECIFIED: ICD-10-CM

## 2017-10-09 DIAGNOSIS — L03.90 CELLULITIS, UNSPECIFIED: ICD-10-CM

## 2017-10-09 DIAGNOSIS — Z78.9 OTHER SPECIFIED HEALTH STATUS: ICD-10-CM

## 2017-10-09 DIAGNOSIS — Z23 ENCOUNTER FOR IMMUNIZATION: ICD-10-CM

## 2017-10-09 DIAGNOSIS — F17.200 NICOTINE DEPENDENCE, UNSPECIFIED, UNCOMPLICATED: ICD-10-CM

## 2017-10-09 DIAGNOSIS — Z72.0 TOBACCO USE: ICD-10-CM

## 2017-10-09 PROCEDURE — 99406 BEHAV CHNG SMOKING 3-10 MIN: CPT

## 2017-10-09 PROCEDURE — 99204 OFFICE O/P NEW MOD 45 MIN: CPT | Mod: 25,GC

## 2017-10-09 RX ORDER — LEVOTHYROXINE SODIUM 137 UG/1
TABLET ORAL
Refills: 0 | Status: ACTIVE | COMMUNITY

## 2017-10-10 DIAGNOSIS — R19.00 INTRA-ABDOMINAL AND PELVIC SWELLING, MASS AND LUMP, UNSPECIFIED SITE: ICD-10-CM

## 2017-10-10 DIAGNOSIS — B35.3 TINEA PEDIS: ICD-10-CM

## 2017-10-10 DIAGNOSIS — Z88.1 ALLERGY STATUS TO OTHER ANTIBIOTIC AGENTS STATUS: ICD-10-CM

## 2017-10-10 DIAGNOSIS — F14.11 COCAINE ABUSE, IN REMISSION: ICD-10-CM

## 2017-10-10 DIAGNOSIS — E87.2 ACIDOSIS: ICD-10-CM

## 2017-10-10 DIAGNOSIS — L03.115 CELLULITIS OF RIGHT LOWER LIMB: ICD-10-CM

## 2017-10-10 DIAGNOSIS — A41.9 SEPSIS, UNSPECIFIED ORGANISM: ICD-10-CM

## 2017-10-10 DIAGNOSIS — L02.611 CUTANEOUS ABSCESS OF RIGHT FOOT: ICD-10-CM

## 2017-10-10 DIAGNOSIS — M79.89 OTHER SPECIFIED SOFT TISSUE DISORDERS: ICD-10-CM

## 2017-10-10 DIAGNOSIS — E03.9 HYPOTHYROIDISM, UNSPECIFIED: ICD-10-CM

## 2017-10-10 DIAGNOSIS — F17.210 NICOTINE DEPENDENCE, CIGARETTES, UNCOMPLICATED: ICD-10-CM

## 2017-11-27 ENCOUNTER — EMERGENCY (EMERGENCY)
Facility: HOSPITAL | Age: 52
LOS: 1 days | Discharge: HOME | End: 2017-11-27
Attending: EMERGENCY MEDICINE | Admitting: EMERGENCY MEDICINE
Payer: COMMERCIAL

## 2017-11-27 VITALS
WEIGHT: 199.96 LBS | RESPIRATION RATE: 18 BRPM | OXYGEN SATURATION: 98 % | HEART RATE: 91 BPM | TEMPERATURE: 98 F | DIASTOLIC BLOOD PRESSURE: 102 MMHG | HEIGHT: 68 IN | SYSTOLIC BLOOD PRESSURE: 147 MMHG

## 2017-11-27 DIAGNOSIS — S22.39XA FRACTURE OF ONE RIB, UNSPECIFIED SIDE, INITIAL ENCOUNTER FOR CLOSED FRACTURE: Chronic | ICD-10-CM

## 2017-11-27 LAB
ALBUMIN SERPL ELPH-MCNC: 4.1 G/DL — SIGNIFICANT CHANGE UP (ref 3.3–5)
ALP SERPL-CCNC: 58 U/L — SIGNIFICANT CHANGE UP (ref 40–120)
ALT FLD-CCNC: 29 U/L — SIGNIFICANT CHANGE UP (ref 10–45)
ANION GAP SERPL CALC-SCNC: 16 MMOL/L — SIGNIFICANT CHANGE UP (ref 5–17)
AST SERPL-CCNC: 21 U/L — SIGNIFICANT CHANGE UP (ref 10–40)
BILIRUB SERPL-MCNC: 0.3 MG/DL — SIGNIFICANT CHANGE UP (ref 0.2–1.2)
BUN SERPL-MCNC: 13 MG/DL — SIGNIFICANT CHANGE UP (ref 7–23)
CALCIUM SERPL-MCNC: 10.1 MG/DL — SIGNIFICANT CHANGE UP (ref 8.4–10.5)
CHLORIDE SERPL-SCNC: 101 MMOL/L — SIGNIFICANT CHANGE UP (ref 96–108)
CK MB CFR SERPL CALC: 5.2 NG/ML — SIGNIFICANT CHANGE UP (ref 0–6.7)
CK SERPL-CCNC: 207 U/L — HIGH (ref 30–200)
CO2 SERPL-SCNC: 24 MMOL/L — SIGNIFICANT CHANGE UP (ref 22–31)
CREAT SERPL-MCNC: 1.21 MG/DL — SIGNIFICANT CHANGE UP (ref 0.5–1.3)
GLUCOSE SERPL-MCNC: 121 MG/DL — HIGH (ref 70–99)
HCT VFR BLD CALC: 54 % — HIGH (ref 39–50)
HGB BLD-MCNC: 17.6 G/DL — HIGH (ref 13–17)
MCHC RBC-ENTMCNC: 30.9 PG — SIGNIFICANT CHANGE UP (ref 27–34)
MCHC RBC-ENTMCNC: 32.6 G/DL — SIGNIFICANT CHANGE UP (ref 32–36)
MCV RBC AUTO: 94.7 FL — SIGNIFICANT CHANGE UP (ref 80–100)
PLATELET # BLD AUTO: 341 K/UL — SIGNIFICANT CHANGE UP (ref 150–400)
POTASSIUM SERPL-MCNC: 4.6 MMOL/L — SIGNIFICANT CHANGE UP (ref 3.5–5.3)
POTASSIUM SERPL-SCNC: 4.6 MMOL/L — SIGNIFICANT CHANGE UP (ref 3.5–5.3)
PROT SERPL-MCNC: 7.6 G/DL — SIGNIFICANT CHANGE UP (ref 6–8.3)
RBC # BLD: 5.7 M/UL — SIGNIFICANT CHANGE UP (ref 4.2–5.8)
RBC # FLD: 13.6 % — SIGNIFICANT CHANGE UP (ref 10.3–16.9)
SODIUM SERPL-SCNC: 141 MMOL/L — SIGNIFICANT CHANGE UP (ref 135–145)
TROPONIN T SERPL-MCNC: <0.01 NG/ML — SIGNIFICANT CHANGE UP (ref 0–0.01)
WBC # BLD: 13.8 K/UL — HIGH (ref 3.8–10.5)
WBC # FLD AUTO: 13.8 K/UL — HIGH (ref 3.8–10.5)

## 2017-11-27 PROCEDURE — 71020: CPT | Mod: 26

## 2017-11-27 PROCEDURE — 99285 EMERGENCY DEPT VISIT HI MDM: CPT

## 2017-11-27 PROCEDURE — 82962 GLUCOSE BLOOD TEST: CPT

## 2017-11-27 PROCEDURE — 80053 COMPREHEN METABOLIC PANEL: CPT

## 2017-11-27 PROCEDURE — 99283 EMERGENCY DEPT VISIT LOW MDM: CPT | Mod: 25

## 2017-11-27 PROCEDURE — 84484 ASSAY OF TROPONIN QUANT: CPT

## 2017-11-27 PROCEDURE — 93005 ELECTROCARDIOGRAM TRACING: CPT

## 2017-11-27 PROCEDURE — 82553 CREATINE MB FRACTION: CPT

## 2017-11-27 PROCEDURE — 82550 ASSAY OF CK (CPK): CPT

## 2017-11-27 PROCEDURE — 71046 X-RAY EXAM CHEST 2 VIEWS: CPT

## 2017-11-27 PROCEDURE — 36415 COLL VENOUS BLD VENIPUNCTURE: CPT

## 2017-11-27 PROCEDURE — 85027 COMPLETE CBC AUTOMATED: CPT

## 2017-11-27 RX ORDER — ACETAMINOPHEN 500 MG
650 TABLET ORAL ONCE
Qty: 0 | Refills: 0 | Status: DISCONTINUED | OUTPATIENT
Start: 2017-11-27 | End: 2017-12-01

## 2017-11-27 RX ORDER — IBUPROFEN 200 MG
400 TABLET ORAL ONCE
Qty: 0 | Refills: 0 | Status: COMPLETED | OUTPATIENT
Start: 2017-11-27 | End: 2017-11-27

## 2017-11-27 RX ADMIN — Medication 400 MILLIGRAM(S): at 19:39

## 2017-11-27 NOTE — ED PROVIDER NOTE - PROGRESS NOTE DETAILS
pt with increasingly aggressive behavior towards staff, yelling, agitated refusing further testing. threat to staff, escorted out with security. pt with increasingly aggressive behavior towards staff, yelling, agitated changed locations and patient able to be calmed.

## 2017-11-27 NOTE — ED PROVIDER NOTE - OBJECTIVE STATEMENT
52M states he did $3000 worth of crack yesterday and now with R hand numbness for two days. On further questioning, pt states that he has feeling in his R hand. Denies chest pain, denies shortness of breath. Denies back pain/shoulder pain. No weakness. Pt states his hand feels "different".

## 2017-11-27 NOTE — ED ADULT NURSE NOTE - OBJECTIVE STATEMENT
Patient reports to smoking crack the past couple of days, reports numbness to his right fingers and palpitations. Patient states that he has smoked it before. Denies any chest pain or shortness of breath. Ambulatory.

## 2017-11-27 NOTE — ED PROVIDER NOTE - MEDICAL DECISION MAKING DETAILS
52M with hand numbness after crack usage, vitals notable for hypertension, plan for ekg labs given hx of crack usage however no numbness or weakness noted on exam and pt denies cp/sob. However, pt increasingly aggravated, agitated, threatening staff creating unsafe environment. Escorted out by security.

## 2017-11-27 NOTE — ED ADULT TRIAGE NOTE - CHIEF COMPLAINT QUOTE
Pt CO Right Hand numbness x2 days.  Pt states "I think something is wrong with my heart because over the past week I've done about $3,000 worth of crack cocaine."  PT denies N/V/D, SOB, Fevers, Dizziness at this time.  FSB

## 2017-12-01 DIAGNOSIS — Z98.890 OTHER SPECIFIED POSTPROCEDURAL STATES: ICD-10-CM

## 2017-12-01 DIAGNOSIS — E03.9 HYPOTHYROIDISM, UNSPECIFIED: ICD-10-CM

## 2017-12-01 DIAGNOSIS — Z79.2 LONG TERM (CURRENT) USE OF ANTIBIOTICS: ICD-10-CM

## 2017-12-01 DIAGNOSIS — Z88.1 ALLERGY STATUS TO OTHER ANTIBIOTIC AGENTS STATUS: ICD-10-CM

## 2017-12-01 DIAGNOSIS — R20.0 ANESTHESIA OF SKIN: ICD-10-CM

## 2017-12-01 DIAGNOSIS — F14.10 COCAINE ABUSE, UNCOMPLICATED: ICD-10-CM

## 2017-12-01 DIAGNOSIS — Z79.899 OTHER LONG TERM (CURRENT) DRUG THERAPY: ICD-10-CM

## 2017-12-08 ENCOUNTER — EMERGENCY (EMERGENCY)
Facility: HOSPITAL | Age: 52
LOS: 1 days | Discharge: HOME | End: 2017-12-08
Attending: EMERGENCY MEDICINE | Admitting: EMERGENCY MEDICINE
Payer: COMMERCIAL

## 2017-12-08 VITALS
SYSTOLIC BLOOD PRESSURE: 136 MMHG | WEIGHT: 190.92 LBS | RESPIRATION RATE: 18 BRPM | OXYGEN SATURATION: 96 % | TEMPERATURE: 98 F | HEART RATE: 92 BPM | DIASTOLIC BLOOD PRESSURE: 87 MMHG

## 2017-12-08 VITALS
RESPIRATION RATE: 18 BRPM | TEMPERATURE: 98 F | OXYGEN SATURATION: 96 % | SYSTOLIC BLOOD PRESSURE: 144 MMHG | DIASTOLIC BLOOD PRESSURE: 90 MMHG | HEART RATE: 76 BPM

## 2017-12-08 DIAGNOSIS — Z88.1 ALLERGY STATUS TO OTHER ANTIBIOTIC AGENTS STATUS: ICD-10-CM

## 2017-12-08 DIAGNOSIS — Z79.2 LONG TERM (CURRENT) USE OF ANTIBIOTICS: ICD-10-CM

## 2017-12-08 DIAGNOSIS — Z79.899 OTHER LONG TERM (CURRENT) DRUG THERAPY: ICD-10-CM

## 2017-12-08 DIAGNOSIS — R20.2 PARESTHESIA OF SKIN: ICD-10-CM

## 2017-12-08 DIAGNOSIS — F17.200 NICOTINE DEPENDENCE, UNSPECIFIED, UNCOMPLICATED: ICD-10-CM

## 2017-12-08 DIAGNOSIS — R20.0 ANESTHESIA OF SKIN: ICD-10-CM

## 2017-12-08 DIAGNOSIS — S22.39XA FRACTURE OF ONE RIB, UNSPECIFIED SIDE, INITIAL ENCOUNTER FOR CLOSED FRACTURE: Chronic | ICD-10-CM

## 2017-12-08 PROCEDURE — 99284 EMERGENCY DEPT VISIT MOD MDM: CPT | Mod: 25

## 2017-12-08 PROCEDURE — 93005 ELECTROCARDIOGRAM TRACING: CPT

## 2017-12-08 PROCEDURE — 93010 ELECTROCARDIOGRAM REPORT: CPT

## 2017-12-08 PROCEDURE — 99283 EMERGENCY DEPT VISIT LOW MDM: CPT | Mod: 25

## 2017-12-08 RX ORDER — LEVOTHYROXINE SODIUM 125 MCG
1 TABLET ORAL
Qty: 5 | Refills: 0
Start: 2017-12-08 | End: 2017-12-13

## 2017-12-08 RX ORDER — IBUPROFEN 200 MG
800 TABLET ORAL ONCE
Qty: 0 | Refills: 0 | Status: DISCONTINUED | OUTPATIENT
Start: 2017-12-08 | End: 2017-12-08

## 2017-12-08 RX ORDER — ACETAMINOPHEN 500 MG
1000 TABLET ORAL ONCE
Qty: 0 | Refills: 0 | Status: COMPLETED | OUTPATIENT
Start: 2017-12-08 | End: 2017-12-08

## 2017-12-08 RX ORDER — IBUPROFEN 200 MG
800 TABLET ORAL ONCE
Qty: 0 | Refills: 0 | Status: COMPLETED | OUTPATIENT
Start: 2017-12-08 | End: 2017-12-08

## 2017-12-08 RX ORDER — NYSTATIN CREAM 100000 [USP'U]/G
1 CREAM TOPICAL
Qty: 1 | Refills: 0
Start: 2017-12-08 | End: 2017-12-13

## 2017-12-08 RX ADMIN — Medication 1000 MILLIGRAM(S): at 11:52

## 2017-12-08 RX ADMIN — Medication 800 MILLIGRAM(S): at 11:52

## 2017-12-08 NOTE — ED ADULT NURSE NOTE - CHPI ED SYMPTOMS NEG
no weakness/no dizziness/no fever/no loss of consciousness/no blurred vision/no confusion/no vomiting/no change in level of consciousness/no nausea

## 2017-12-08 NOTE — ED PROVIDER NOTE - MEDICAL DECISION MAKING DETAILS
53 y/o male with distal finger tip paresthesia. PE: sensation, reflex and motor function intact. Pt given ibu/tyl with improved pain. Pt given referral for neurology. in addition medication refilled. pt advised to fu with primary care clinic - will call for appointment for PMD.

## 2017-12-08 NOTE — ED ADULT NURSE NOTE - OTHER COMPLAINTS
c/o of right sided arm numnbess x 2 weeks that is getting worse over the last few days.  denies headache, dizziness, cp, weakness. ekg in progress.

## 2017-12-08 NOTE — ED PROVIDER NOTE - OBJECTIVE STATEMENT
53 y/o male with a PMHx of hypothyroid is present with distal finger tip paresthesia x3 weeks. Pt states that he smoked a large amount of crack and ever since then has been experiencing the same symptoms. Pt reports a pins and needle sensation but still has sensation to his finger tips. Pt reports the sensation is located on the 2nd-4th digit, but still able to use his right hand without limitation. He denies otc medication.

## 2017-12-08 NOTE — ED ADULT NURSE NOTE - OBJECTIVE STATEMENT
Pt states that he was clean from drugs, but two weeks ago used crack cocaine, and after started experiencing numbness in his right hand. Pt reports that he came here 10 days ago for the same problem but was discharged without relief. Pt reports that the numbness has extended to his right elbow and is now experiencing pain in his right hand and in his right elbow. Pt denies headache, blurry vision, mental status changes, tingling, N/V.

## 2017-12-08 NOTE — ED PROVIDER NOTE - ATTENDING CONTRIBUTION TO CARE
52 M- co numbness to fingers- R hand sx for weeks no head/neck trauma  no neck trauma- started after smoking crack  vss  s1s2 lungs cta bl  abd soft nt nd +bs  ext no c/c/e  neurovascularly intact  IMP- Paresthesia  reassurance fu w pmd

## 2017-12-08 NOTE — ED ADULT TRIAGE NOTE - OTHER COMPLAINTS
c/o of right sided arm numnbess x 2 weeks that is getting worse over the last few days.  denies headache, dizziness, cp, weakness. c/o of right sided arm numnbess x 2 weeks that is getting worse over the last few days.  denies headache, dizziness, cp, weakness. ekg in progress.

## 2017-12-08 NOTE — ED PROVIDER NOTE - PHYSICAL EXAMINATION
Right hand: sensation, motor function and reflex intact with 2+ pulses and 5/5 muscle strength without skin changes

## 2017-12-08 NOTE — ED PROVIDER NOTE - CARE PLAN
Principal Discharge DX:	Paresthesia of hand  Instructions for follow-up, activity and diet:	Please follow up with Dr. Mcadams

## 2017-12-18 ENCOUNTER — APPOINTMENT (OUTPATIENT)
Dept: NEUROLOGY | Facility: CLINIC | Age: 52
End: 2017-12-18
Payer: COMMERCIAL

## 2017-12-18 VITALS
DIASTOLIC BLOOD PRESSURE: 101 MMHG | HEIGHT: 68 IN | SYSTOLIC BLOOD PRESSURE: 157 MMHG | BODY MASS INDEX: 29.1 KG/M2 | WEIGHT: 192 LBS | OXYGEN SATURATION: 96 % | HEART RATE: 98 BPM | TEMPERATURE: 98.9 F

## 2017-12-18 DIAGNOSIS — M54.12 RADICULOPATHY, CERVICAL REGION: ICD-10-CM

## 2017-12-18 PROCEDURE — 99204 OFFICE O/P NEW MOD 45 MIN: CPT

## 2018-01-05 ENCOUNTER — APPOINTMENT (OUTPATIENT)
Dept: NEUROLOGY | Facility: CLINIC | Age: 53
End: 2018-01-05

## 2018-01-28 ENCOUNTER — EMERGENCY (EMERGENCY)
Facility: HOSPITAL | Age: 53
LOS: 1 days | Discharge: HOME | End: 2018-01-28
Admitting: EMERGENCY MEDICINE
Payer: SELF-PAY

## 2018-01-28 VITALS
OXYGEN SATURATION: 96 % | RESPIRATION RATE: 18 BRPM | HEART RATE: 92 BPM | SYSTOLIC BLOOD PRESSURE: 157 MMHG | DIASTOLIC BLOOD PRESSURE: 105 MMHG | TEMPERATURE: 98 F

## 2018-01-28 DIAGNOSIS — Z88.1 ALLERGY STATUS TO OTHER ANTIBIOTIC AGENTS STATUS: ICD-10-CM

## 2018-01-28 DIAGNOSIS — Z79.899 OTHER LONG TERM (CURRENT) DRUG THERAPY: ICD-10-CM

## 2018-01-28 DIAGNOSIS — M25.562 PAIN IN LEFT KNEE: ICD-10-CM

## 2018-01-28 DIAGNOSIS — E03.9 HYPOTHYROIDISM, UNSPECIFIED: ICD-10-CM

## 2018-01-28 DIAGNOSIS — S22.39XA FRACTURE OF ONE RIB, UNSPECIFIED SIDE, INITIAL ENCOUNTER FOR CLOSED FRACTURE: Chronic | ICD-10-CM

## 2018-01-28 DIAGNOSIS — Z79.2 LONG TERM (CURRENT) USE OF ANTIBIOTICS: ICD-10-CM

## 2018-01-28 PROCEDURE — 99283 EMERGENCY DEPT VISIT LOW MDM: CPT | Mod: 25

## 2018-01-28 PROCEDURE — 99283 EMERGENCY DEPT VISIT LOW MDM: CPT

## 2018-01-28 PROCEDURE — 99053 MED SERV 10PM-8AM 24 HR FAC: CPT

## 2018-01-28 RX ORDER — LEVOTHYROXINE SODIUM 125 MCG
1 TABLET ORAL
Qty: 30 | Refills: 0
Start: 2018-01-28 | End: 2019-05-03

## 2018-01-28 RX ORDER — LEVOTHYROXINE SODIUM 125 MCG
1 TABLET ORAL
Qty: 30 | Refills: 0 | OUTPATIENT
Start: 2018-01-28 | End: 2018-02-26

## 2018-01-28 RX ORDER — IBUPROFEN 200 MG
600 TABLET ORAL ONCE
Qty: 0 | Refills: 0 | Status: COMPLETED | OUTPATIENT
Start: 2018-01-28 | End: 2018-01-28

## 2018-01-28 RX ADMIN — Medication 600 MILLIGRAM(S): at 05:24

## 2018-01-28 NOTE — ED PROVIDER NOTE - OBJECTIVE STATEMENT
pt to ed co running out of thyroid meds and pain to knee from arthritis pt well known to ED no fever no dizzy no headache no chills no NVD no chest pain no SOB no shakes no aches no other  injury no other complaints

## 2018-01-28 NOTE — ED PROVIDER NOTE - MEDICAL DECISION MAKING DETAILS
pt known to ED comes with arthritis to knee and finger pain wants referral to dr siddiqi and refill on levothyroxine I have discussed the discharge plan with the patient. The patient agrees with the plan, as discussed.  The patient understands Emergency Department diagnosis is a preliminary diagnosis often based on limited information and that the patient must adhere to the follow-up plan as discussed.  The patient understands that if the symptoms worsen or if prescribed medications do not have the desired/planned effect that the patient may return to the Emergency Department at any time for further evaluation and treatment.

## 2018-01-28 NOTE — ED ADULT TRIAGE NOTE - CHIEF COMPLAINT QUOTE
I have pain in my left knee when I bend it, my left index finger, and I'm out of my thyroid medication.
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
## 2018-07-22 PROBLEM — Z78.9 ALCOHOL USE: Status: ACTIVE | Noted: 2017-10-09

## 2018-07-29 NOTE — ED PROVIDER NOTE - PSYCHIATRIC, MLM
never
Alert and oriented to person, place, time/situation. normal mood and affect. no apparent risk to self or others.

## 2018-10-05 ENCOUNTER — OTHER (OUTPATIENT)
Age: 53
End: 2018-10-05

## 2018-10-05 NOTE — ED ADULT TRIAGE NOTE - ADDITIONAL COMPLAINTS
Problem: Adult Mental Health  Goal: Exhibits reduction in symptoms associated with elevated or labile mood/macarena  Outcome: Outcome Not Met, Continue to Monitor  Pt. Is unable to swallow depakote pills, having a hard time with any pills.  Depakote 500 mg cut in half, pt unable to swallow with yogurt, and attempted to chew pills unable to swallow shards of pills.  Pt. Ingested an undetermined  portion of 1000mg brittni. Dose.         Additional Complaints

## 2018-11-14 NOTE — ED ADULT NURSE NOTE - PRIVACY CONCERNS
[facility], Emergency Department    [address]    [place] [address]    Phone:  [phone]    Fax:  [phone]                                       Dante Pearson   MRN: [id number]    Department:  [facility], Emergency Department   Date of Visit:  11/14/2018           Patient Information     Date Of Birth          1995        Your diagnoses for this visit were:     Gastroesophageal reflux disease without esophagitis        You were seen by Zeyad Carlos MD.        Discharge Instructions       Please make an appointment to follow up with Your Primary Care Provider in 3-5 days even if entirely better.    Discharge Instructions for Gastroesophageal Reflux Disease (GERD)  Gastroesophageal reflux disease (GERD) is a backflow of acid from the stomach into the swallowing tube (esophagus).  Home care  These home care steps can help you manage GERD:    Maintain a healthy weight. Get help to lose any extra pounds.    Avoid lying down after meals.    Avoid eating late at night.    Elevate the head of your bed by 6 inches. You can do this by placing wooden blocks or bed risers under the head of your bed.    Avoid wearing tight-fitting clothes.    Avoid foods that might irritate your stomach, such as the following:  ? Alcohol  ? Fat  ? Chocolate  ? Caffeine  ? Spearmint or peppermint    Talk to your healthcare provider if you are taking any of the following medicines. These medicines can make GERD symptoms worse:  ? Calcium channel blockers  ? Theophylline  ? Anticholinergic medicines, such as oxybutynin and benzatropine    Begin an exercise program. Ask your healthcare provider how to get started. You can benefit from simple activities, such as walking or gardening.    Break the smoking habit. Enroll in a stop-smoking program to improve your chances of success.    Limit alcohol intake to no more than 2 drinks a day.    Take your medicines exactly as directed. Don t skip doses.    Avoid  over-the-counter nonsteroidal anti-inflammatory medicines, such as aspirin and ibuprofen, unless recommended by your healthcare provider for certain conditions.     If possible, avoid nitrates (heart medicines, such as nitroglycerin and isosorbide dinitrate ).  Follow-up care  Make a follow-up appointment as directed by our staff.     When to call the healthcare provider  Call your healthcare provider immediately if you have any of the following:    Trouble swallowing    Pain when swallowing    Feeling of food caught in your chest or throat    Pain in the neck, chest, or back    Heartburn that causes you to vomit    Vomiting blood    Black or tarry stools (from digested blood)    More saliva (watering of the mouth) than usual    Weight loss of more than 3% to 5% of your total body weight in a month    Hoarseness or sore throat that won t go away    Choking, coughing, or wheezing   Date Last Reviewed: 7/1/2016 2000-2018 [employer]. [address] [place] [address]. All rights reserved. This information is not intended as a substitute for professional medical care. Always follow your healthcare professional's instructions.            24 Hour Appointment Hotline       To make an appointment at any [facility] (formerly [facility]), call [phone] ([phone]). If you don't have a family doctor or clinic, we will help you find one. [facility] are conveniently located to serve the needs of you and your family.             Review of your medicines      START taking        Dose / Directions Last dose taken    famotidine 20 MG tablet   Commonly known as:  PEPCID   Dose:  20 mg   Quantity:  60 tablet        Take 1 tablet (20 mg) by mouth 2 times daily   Refills:  1                Prescriptions were sent or printed at these locations (1 Prescription)                   Other Prescriptions                Printed at Department/Unit printer (1 of 1)         famotidine (PEPCID) 20 MG tablet               "  Procedures and tests performed during your visit     CBC with platelets differential    EKG 12 lead    Troponin I      Orders Needing Specimen Collection     None      Pending Results     Date and Time Order Name Status Description    2018 0150 CBC with platelets differential In process             Pending Culture Results     No orders found from 2018 to 11/15/2018.            Pending Results Instructions     If you had any lab results that were not finalized at the time of your Discharge, you can call the ED Lab Result RN at [phone]. You will be contacted by this team for any positive Lab results or changes in treatment. The nurses are available 7 days a week from 10A to 6:30P.  You can leave a message 24 hours per day and they will return your call.        Thank you for choosing [facility]       Thank you for choosing [facility] for your care. Our goal is always to provide you with excellent care. Hearing back from our patients is one way we can continue to improve our services. Please take a few minutes to complete the written survey that you may receive in the mail after you visit with us. Thank you!        [employer] Information     [employer] lets you send messages to your doctor, view your test results, renew your prescriptions, schedule appointments and more. To sign up, go to [email or web address] . Click on \"Log in\" on the left side of the screen, which will take you to the Welcome page. Then click on \"Sign up Now\" on the right side of the page.     You will be asked to enter the access code listed below, as well as some personal information. Please follow the directions to create your username and password.     Your access code is: [email or web address]  Expires: 2019  2:22 AM     Your access code will  in 90 days. If you need help or a new code, please call your [facility] clinic or [phone].        Care EveryWhere ID     This is your Care EveryWhere ID. This could be used by other " organizations to access your [facility] medical records  [id number]        Equal Access to Services     CHRISTIANO ZAPATA : Gerber Freeman, melissa rosenthal, karime moore. So [facility] [phone].    ATENCIÓN: Si habla español, tiene a fung disposición servicios gratuitos de asistencia lingüística. Llame al [phone].    We comply with applicable federal civil rights laws and [place] laws. We do not discriminate on the basis of race, color, national origin, age, disability, sex, sexual orientation, or gender identity.            After Visit Summary       This is your record. Keep this with you and show to your community pharmacist(s) and doctor(s) at your next visit.                   No

## 2019-02-05 ENCOUNTER — EMERGENCY (EMERGENCY)
Facility: HOSPITAL | Age: 54
LOS: 1 days | Discharge: HOME | End: 2019-02-05
Admitting: EMERGENCY MEDICINE
Payer: SELF-PAY

## 2019-02-05 VITALS
WEIGHT: 195.11 LBS | HEART RATE: 88 BPM | TEMPERATURE: 98 F | SYSTOLIC BLOOD PRESSURE: 140 MMHG | DIASTOLIC BLOOD PRESSURE: 70 MMHG | RESPIRATION RATE: 18 BRPM | OXYGEN SATURATION: 100 %

## 2019-02-05 DIAGNOSIS — F17.200 NICOTINE DEPENDENCE, UNSPECIFIED, UNCOMPLICATED: ICD-10-CM

## 2019-02-05 DIAGNOSIS — Z76.0 ENCOUNTER FOR ISSUE OF REPEAT PRESCRIPTION: ICD-10-CM

## 2019-02-05 DIAGNOSIS — S22.39XA FRACTURE OF ONE RIB, UNSPECIFIED SIDE, INITIAL ENCOUNTER FOR CLOSED FRACTURE: Chronic | ICD-10-CM

## 2019-02-05 DIAGNOSIS — Z88.1 ALLERGY STATUS TO OTHER ANTIBIOTIC AGENTS STATUS: ICD-10-CM

## 2019-02-05 PROCEDURE — 99283 EMERGENCY DEPT VISIT LOW MDM: CPT

## 2019-02-05 RX ORDER — LEVOTHYROXINE SODIUM 125 MCG
200 TABLET ORAL ONCE
Qty: 0 | Refills: 0 | Status: COMPLETED | OUTPATIENT
Start: 2019-02-05 | End: 2019-02-05

## 2019-02-05 RX ORDER — LEVOTHYROXINE SODIUM 125 MCG
1 TABLET ORAL
Qty: 14 | Refills: 0
Start: 2019-02-05 | End: 2019-02-18

## 2019-02-05 RX ADMIN — Medication 200 MICROGRAM(S): at 21:51

## 2019-02-05 NOTE — ED ADULT NURSE NOTE - CHIEF COMPLAINT QUOTE
pt run out of  tyroid medication for 2 days. pt denies fever, no abdominal pain, no vomiting, no diarrhea.
Intra-abdominal and pelvic swelling, mass and lump, unspecified site

## 2019-02-05 NOTE — ED PROVIDER NOTE - MEDICAL DECISION MAKING DETAILS
Asymptomatic patient to ED for med refill.  No acute abnormalities on exam.  Will Rx 2 week supply synthroid.

## 2019-02-05 NOTE — ED PROVIDER NOTE - OBJECTIVE STATEMENT
pt with hx hypothyroidism ran out of synthroid 2 days ago. requesting refill.  has no specific complaints.  says he just isn't as energetic when he misses a dose.

## 2019-02-05 NOTE — ED PROVIDER NOTE - PHYSICAL EXAMINATION
GEN: awake, alert, NAD  EYES: Clear, Pupils equal and round.  PULM: Lungs clear  CV: RRR S1S2  GI: Abd soft, nontender  MSK: CHA without difficulty  SKIN: normal color and turgor, no rash  NEURO: Alert, oriented. CHA normally.  Speech clear.  Gait steady.

## 2019-02-05 NOTE — ED ADULT TRIAGE NOTE - CHIEF COMPLAINT QUOTE
pt run out of  tyroid medication for 2 days. pt denies fever, no abdominal pain, no vomiting, no diarrhea.

## 2019-02-05 NOTE — ED PROVIDER NOTE - NS ED ROS FT
CONSTITUTIONAL: No fever, chills, or weakness  NEURO: No headache, no dizziness, no syncope; No focal weakness/tingling/numbness  EYES: No visual changes  ENT: No rhinorrhea or sore throat  PULM: No cough or dyspnea  CV: No chest pain or palpitations  GI: No abdominal pain, vomiting, or diarrhea  : No dysuria, hematuria, frequency  MSK: No neck pain or back pain, no joint pain  SKIN: no rash or unusual bruising  PSYCH: denies psych hx, no SI/HI/hallucinations

## 2019-02-06 PROBLEM — F19.10 OTHER PSYCHOACTIVE SUBSTANCE ABUSE, UNCOMPLICATED: Chronic | Status: ACTIVE | Noted: 2017-06-01

## 2019-04-04 ENCOUNTER — EMERGENCY (EMERGENCY)
Facility: HOSPITAL | Age: 54
LOS: 1 days | Discharge: HOME | End: 2019-04-04
Admitting: EMERGENCY MEDICINE
Payer: COMMERCIAL

## 2019-04-04 VITALS
WEIGHT: 160.06 LBS | TEMPERATURE: 98 F | HEART RATE: 107 BPM | OXYGEN SATURATION: 97 % | RESPIRATION RATE: 16 BRPM | DIASTOLIC BLOOD PRESSURE: 97 MMHG | SYSTOLIC BLOOD PRESSURE: 147 MMHG

## 2019-04-04 DIAGNOSIS — M79.642 PAIN IN LEFT HAND: ICD-10-CM

## 2019-04-04 DIAGNOSIS — S22.39XA FRACTURE OF ONE RIB, UNSPECIFIED SIDE, INITIAL ENCOUNTER FOR CLOSED FRACTURE: Chronic | ICD-10-CM

## 2019-04-04 DIAGNOSIS — Z79.2 LONG TERM (CURRENT) USE OF ANTIBIOTICS: ICD-10-CM

## 2019-04-04 DIAGNOSIS — Z76.0 ENCOUNTER FOR ISSUE OF REPEAT PRESCRIPTION: ICD-10-CM

## 2019-04-04 DIAGNOSIS — Z79.899 OTHER LONG TERM (CURRENT) DRUG THERAPY: ICD-10-CM

## 2019-04-04 DIAGNOSIS — Z88.1 ALLERGY STATUS TO OTHER ANTIBIOTIC AGENTS STATUS: ICD-10-CM

## 2019-04-04 PROCEDURE — 99283 EMERGENCY DEPT VISIT LOW MDM: CPT

## 2019-04-04 PROCEDURE — 99283 EMERGENCY DEPT VISIT LOW MDM: CPT | Mod: 25

## 2019-04-04 PROCEDURE — 73130 X-RAY EXAM OF HAND: CPT

## 2019-04-04 PROCEDURE — 73130 X-RAY EXAM OF HAND: CPT | Mod: 26,LT

## 2019-04-04 RX ORDER — IBUPROFEN 200 MG
600 TABLET ORAL ONCE
Qty: 0 | Refills: 0 | Status: COMPLETED | OUTPATIENT
Start: 2019-04-04 | End: 2019-04-04

## 2019-04-04 RX ORDER — LEVOTHYROXINE SODIUM 125 MCG
200 TABLET ORAL ONCE
Qty: 0 | Refills: 0 | Status: COMPLETED | OUTPATIENT
Start: 2019-04-04 | End: 2019-04-04

## 2019-04-04 RX ADMIN — Medication 600 MILLIGRAM(S): at 06:00

## 2019-04-04 RX ADMIN — Medication 200 MICROGRAM(S): at 06:22

## 2019-04-04 NOTE — ED PROVIDER NOTE - OBJECTIVE STATEMENT
left hand pain x several days after having hit a door -denies other injury Additionally requests refill on synthroid

## 2019-04-04 NOTE — ED ADULT NURSE NOTE - NSIMPLEMENTINTERV_GEN_ALL_ED
Implemented All Universal Safety Interventions:  [place] to call system. Call bell, personal items and telephone within reach. Instruct patient to call for assistance. Room bathroom lighting operational. Non-slip footwear when patient is off stretcher. Physically safe environment: no spills, clutter or unnecessary equipment. Stretcher in lowest position, wheels locked, appropriate side rails in place.

## 2019-04-04 NOTE — ED PROVIDER NOTE - PHYSICAL EXAMINATION
skin intact , compartments soft + moves extremity upon command pulses 2+ regular RUM nerve intact M/S cap refill brisk skin warm and pink  no overt swelling or limitation noted skin intact , compartments soft + moves extremity upon command pulses 2+ regular RUM nerve intact M/S cap refill brisk skin warm and pink  no overt swelling or limitation noted with ROM

## 2019-04-04 NOTE — ED ADULT TRIAGE NOTE - CHIEF COMPLAINT QUOTE
pt complaining of left hand pain s/p punching a wall 4-5 days ago taking Aleve and using ice without some relief. Also wants med refill for synthroid

## 2019-04-09 NOTE — ED ADULT TRIAGE NOTE - NS ED TRIAGE AVPU SCALE
Alert-The patient is alert, awake and responds to voice. The patient is oriented to time, place, and person. The triage nurse is able to obtain subjective information. nonverbal cues (demo/gestures)/1 person assist/verbal cues

## 2019-04-14 ENCOUNTER — EMERGENCY (EMERGENCY)
Facility: HOSPITAL | Age: 54
LOS: 1 days | Discharge: HOME | End: 2019-04-14
Attending: EMERGENCY MEDICINE | Admitting: EMERGENCY MEDICINE
Payer: COMMERCIAL

## 2019-04-14 VITALS
HEIGHT: 67 IN | OXYGEN SATURATION: 95 % | RESPIRATION RATE: 19 BRPM | HEART RATE: 82 BPM | TEMPERATURE: 98 F | DIASTOLIC BLOOD PRESSURE: 92 MMHG | SYSTOLIC BLOOD PRESSURE: 143 MMHG | WEIGHT: 183.87 LBS

## 2019-04-14 DIAGNOSIS — S22.39XA FRACTURE OF ONE RIB, UNSPECIFIED SIDE, INITIAL ENCOUNTER FOR CLOSED FRACTURE: Chronic | ICD-10-CM

## 2019-04-14 PROCEDURE — 99283 EMERGENCY DEPT VISIT LOW MDM: CPT | Mod: 25

## 2019-04-14 RX ORDER — IBUPROFEN 200 MG
600 TABLET ORAL ONCE
Qty: 0 | Refills: 0 | Status: COMPLETED | OUTPATIENT
Start: 2019-04-14 | End: 2019-04-14

## 2019-04-14 NOTE — ED PROVIDER NOTE - CLINICAL SUMMARY MEDICAL DECISION MAKING FREE TEXT BOX
pt with hand injury 10 days ago here for improved but persistent pain. Xray reviewed and no fracture. No indication for repeat XRAY. Exam unremarkable with no c/f missed fracture, septic joint or gout. He ate a sandwich and was given motrin. The patient is stable for DC and is feeling much better.  Symptoms have improved and after discussion regarding discharge, patient feels comfortable going home.  Answers to all questions provided.  Patient feeling satisfactory with care and follow up plan discussed. They were advised to call their PMD for prompt outpatient follow up. Return precautions were discussed. The patient was advised to return to the ER for any concerning or worsening symptoms.

## 2019-04-14 NOTE — ED ADULT NURSE NOTE - OBJECTIVE STATEMENT
54 y/o male c/o left hand pain. Pt reports left hand trauma approximately 10 days ago, was treated at [facility] ED, however is continuing to have left hand pain. Pain is less than trauma day incident but pt reports minor swelling to pinky new development. Pt requesting pain management. Pt speaks clear, MAEx4, ambulates steady, unlabored breathing. Abd soft nt nd. Skin dry warm.

## 2019-04-14 NOTE — ED PROVIDER NOTE - NSFOLLOWUPCLINICS_GEN_ALL_ED_FT
[facility]  Family Medicine  [facility]. [address]  [place], [place] [facility]  Phone: [phone]  Fax:   Follow Up Time:

## 2019-04-14 NOTE — ED PROVIDER NOTE - NSFOLLOWUPINSTRUCTIONS_ED_ALL_ED_FT
Contusion    A contusion is a deep bruise. Contusions are the result of a blunt injury to tissues and muscle fibers under the skin. The skin overlying the contusion may turn blue, purple, or yellow. Symptoms also include pain and swelling in the injured area.    Please take tylenol or motrin for pain. Follow up with a primary care doctor or the [facility].    SEEK IMMEDIATE MEDICAL CARE IF YOU HAVE ANY OF THE FOLLOWING SYMPTOMS: severe pain, numbness, tingling, pain, weakness, or skin color/temperature change in any part of your body distal to the injury.

## 2019-04-14 NOTE — ED ADULT NURSE NOTE - NSIMPLEMENTINTERV_GEN_ALL_ED
Implemented All Universal Safety Interventions:  [place] to call system. Call bell, personal items and telephone within reach. Instruct patient to call for assistance. Room bathroom lighting operational. Non-slip footwear when patient is off stretcher. Physically safe environment: no spills, clutter or unnecessary equipment. Stretcher in lowest position, wheels locked, appropriate side rails in place.

## 2019-04-14 NOTE — ED PROVIDER NOTE - PHYSICAL EXAMINATION
GEN: Well appearing, well nourished, awake, alert, oriented to person, place, time/situation and in no apparent distress.  ENT: Airway patent, Nasal mucosa clear. Mouth with normal mucosa.  EYES: Clear bilaterally.  RESPIRATORY: Breathing comfortably with normal RR.  CARDIAC: Regular rate and rhythm  ABDOMEN: Soft, nontender, +bowel sounds, no rebound, rigidity, or guarding.  MSK: Range of motion is not limited, no deformities noted. Very mild TTP over left 5th metacarpal. Distal ext NVI with +2 pulses, compartments soft, no ligamentous instability. FDS and FDP intact in all fingers. No swelling or redness, no pain with ROM  NEURO: Alert and oriented x 3. Cn 2-12 intact. Strength 5/5 and sensation intact in all 4 extremities. Gait normal.   SKIN: Skin normal color for race, warm, dry and intact. No evidence of rash.  PSYCH: Alert and oriented to person, place, time/situation. normal mood and affect. no apparent risk to self or others.

## 2019-04-14 NOTE — ED ADULT TRIAGE NOTE - OTHER COMPLAINTS
CC of traumatic hand pain x ~ 2 weeks ago and had check here 10 days ago, as per pt it is still painful but less swollen.

## 2019-04-14 NOTE — ED PROVIDER NOTE - OBJECTIVE STATEMENT
53M who p/w left hand pain after he punched a wall ~ 10 days ago, he was seen in the ER at that time and had a XRay that was negative from fracture. He states it is overall much better, with less swelling and pain, but it still bothers him a little, no f/c, no redness, no n/t/w. No other complaints.

## 2019-04-15 PROCEDURE — 99283 EMERGENCY DEPT VISIT LOW MDM: CPT

## 2019-04-15 RX ADMIN — Medication 600 MILLIGRAM(S): at 00:03

## 2019-04-18 DIAGNOSIS — E03.9 HYPOTHYROIDISM, UNSPECIFIED: ICD-10-CM

## 2019-04-18 DIAGNOSIS — M79.89 OTHER SPECIFIED SOFT TISSUE DISORDERS: ICD-10-CM

## 2019-04-18 DIAGNOSIS — S69.92XS UNSPECIFIED INJURY OF LEFT WRIST, HAND AND FINGER(S), SEQUELA: ICD-10-CM
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
## 2019-04-18 DIAGNOSIS — M79.642 PAIN IN LEFT HAND: ICD-10-CM

## 2019-04-18 DIAGNOSIS — Z88.1 ALLERGY STATUS TO OTHER ANTIBIOTIC AGENTS STATUS: ICD-10-CM

## 2019-04-18 DIAGNOSIS — Z79.899 OTHER LONG TERM (CURRENT) DRUG THERAPY: ICD-10-CM

## 2019-04-22 NOTE — ED ADULT NURSE NOTE - CAS EDN DISCHARGE ASSESSMENT
INPATIENT REHABILITATION PROGRAM      Patient:  Kareen Camara Date:  2019   :  1944 Attending:  Oswaldo Latham MD   75 year old female      CHIEF COMPLAINT:   FOLLOW UP   Acute ischemic left MCA stroke (CMS/HCC)      SUBJECTIVE:   Patient is doing okay, she has no complaints.  She is sleeping okay but at home she uses zaleplon nightly.  Her  will bring it in from home.    OBJECTIVE:  Visit Vitals  /80 (BP Location: [facility] [place] Patient Position: Sitting)   Pulse 70   Temp 98.4 °F (36.9 °C) (Temporal)   Resp 18   Ht 5' 2\" (1.575 m)   Wt 64.7 kg   SpO2 97%   BMI 26.09 kg/m²     PHYSICAL EXAMINATION:  GENERAL:  The patient appears comfortable, well nourished.  CHEST:  Clear to auscultation, normal respiratory rate.  CARDIOVASCULAR:  Regular rate and rhythm, normal S1, S2.  ABDOMEN:  Soft, nontender, nondistended, negative hepatosplenomegaly.  EXTREMITIES:  Without clubbing, cyanosis, or edema.  NEUROLOGIC:  Patient is alert and oriented to person place and time.  Right sided weakness 3-4/5 proximal muscles, 1-2/5 in distal muscles.  Expressive aphasia, mild.  Patient ambulates with minimal assistance, drags right leg but appears grossly steady using rolling walker with platform.    LABORATORY STUDIES:  Lab Results   Component Value Date    SODIUM 141 2019    POTASSIUM 3.8 2019    CHLORIDE 104 2019    CO2 32 2019    CALCIUM 8.8 2019    BUN 24 (H) 2019    CREATININE 1.16 (H) 2019    WBC 5.3 2019    HCT 33.2 (L) 2019    HGB 10.6 (L) 2019     2019    ALBUMIN 4.4 2019    GFRA 53 2019    GFRNA 46 2019    GLUCOSE 90 2019       MEDICATIONS:  Current Facility-Administered Medications   Medication   • hydrOXYpropyl methylcellulose (ISOPTO TEARS) 0.5 % ophthalmic solution 1 drop   • allopurinol (ZYLOPRIM) tablet 200 mg   • aspirin tablet 325 mg   • carvedilol (COREG) tablet 50 mg   • clopidogrel (PLAVIX) tablet 75  mg   • colchicine (COLCRYS) tablet 0.6 mg   • hydrALAZINE (APRESOLINE) tablet 75 mg   • pramipexole (MIRAPEX) tablet 0.75 mg   • pramipexole (MIRAPEX) tablet 0.125 mg   • zolpidem (AMBIEN) tablet 5 mg   • magnesium lactate (MAG-TAB SR) SR tablet 84 mg   • melatonin tablet 6 mg   • acetaminophen (TYLENOL) tablet 650 mg   • docusate sodium-sennosides (SENOKOT S) 50-8.6 MG 2 tablet   • bisacodyl (DULCOLAX) suppository 10 mg   • magnesium hydroxide (MILK OF MAGNESIA) concentrate 2,400 mg       IMAGING:  No results found.    CURRENT FUNCTIONAL STATUS:    Bed Mobility        Transfers  Transfers  Sit to Stand: Minimal Assist (Min) (04/21/19 1127)  Stand to Sit: Minimal Assist (Min) (04/21/19 1127)  Stand Pivot Transfers: Minimal Assist (Min) (04/21/19 1127)  Assistive Device/: Standing walker;1 Person;Gait Belt (04/21/19 1127)  Transfer Comments 1: Assist R UE onto jamaal walker (04/21/19 1127)    Gait  Gait  Gait Assistance: Minimal Assist (Min) (04/21/19 1127)  Assistive Device/: Standing walker (04/21/19 1127)  Ambulation Distance (Feet): 120 Feet (04/21/19 1127)  IRP Gait: Yes, the patient is walking (04/21/19 1127)  Walk 10 feet: Minimal Assist (Min) (04/21/19 1127)  Walk 50 feet with 2 turns: Minimal Assist (Min) (04/21/19 1127)  Walk 150 feet: Not attempted due to safety concerns (04/21/19 1127)  Walk 10 feet on uneven or sloping surfaces: Not attempted due to safety concerns (04/21/19 1127)   small object from floor: Not attempted due to safety concerns (04/21/19 1127)  IRP Gait Comments: min assist for steadying jamaal walker and holding RUE on jamaal walker (04/21/19 1127)  Pattern: Foot drag R (04/21/19 1127)  Ambulation Surface: Tile (04/21/19 1127)  Gait Comments 1: needs assist on jamaal walker to prevent from moving forward to quickly.  (04/21/19 1127)  Gait Comments 2: Drays right foot with some improvement following verbal cues (04/21/19 1127)  Second Trial: No (04/21/19 1127),       Stairs       Wheelchair Mobility       Balance       Activities of Daily Living   Self Cares/ADL's  Oral Hygiene Assistance: Supervision;Set-up (04/21/19 1005)  Grooming/Oral Hygiene Deficit: Setup (04/21/19 1005)  Lower Body Clothing Assistance: Moderate Assist (Mod) (04/21/19 1005)  Footwear Assistance: Moderate Assist (Mod) (04/21/19 1005)  Lower Body Dressing Deficit: Steadying;Supervision/Safety;Thread RLE into pants;Pull up over hips (04/21/19 1005)  Toileting Assistance: Minimal Assist (Min) (04/21/19 1005)  Toileting Deficit: Clothing management up (04/21/19 1005)    Household Mobility  Household Mobility  Sit to Stand: Minimal Assist (Min) (04/21/19 1005)  Stand to Sit: Minimal Assist (Min) (04/21/19 1005)  Stand Pivot Transfers: Minimal Assist (Min) (04/21/19 1005)  Toilet Transfers: Minimal Assist (Min) (04/21/19 1005)  Transfer Equipment: jamaal walker and gait belt (04/21/19 1005)  Sitting - Static: Supervision (04/21/19 1005)  Sitting - Dynamic: Supervision (04/21/19 1005)  Standing - Static: Touching/Steadying Assistance (04/21/19 1005)  Standing - Dynamic: Minimal Assist (Min) (04/21/19 1005)  Household Mobility Comments #1: help with placement of right arm and steadying in Jamaal walker (04/21/19 1005)    Home Management       Tolerance  OT Activity Tolerance  Activity Tolerance: 1:1 Activity to rest (04/21/19 1005)  Vital Signs: vss (04/21/19 1005)  Activity Tolerance Comments: fair (04/21/19 1005)    Cognition         Impressions/Plan/Discharge Plan:  Acute ischemic left MCA stroke (CMS/HCC)  Patient Active Problem List    Diagnosis Date Noted   • Bilateral edema of lower extremity      Priority: Low   • Cervical spinal stenosis      Priority: Low   • Chronic pain      Priority: Low   • Expressive aphasia      Priority: Low   • Gout      Priority: Low   • HLD (hyperlipidemia)      Priority: Low   • HTN (hypertension)      Priority: Low   • Right spastic hemiparesis (CMS/HCC)      Priority: Low   •  RLS (restless legs syndrome)      Priority: Low   • Spinal stenosis      Priority: Low   • Venous insufficiency      Priority: Low   • Acute ischemic left MCA stroke (CMS/[facility])      Priority: Low   • History of left common carotid artery stent placement 04/10/2019     Priority: Low     Dr. Kalin Graham at [facility]     • History of ischemic left MCA stroke 01/01/2004     Priority: Low     followed by a course of acute inpatient rehab at [facility]           Acute new left MCA ischemic stroke  Left internal carotid artery stenosis (>90%) in patient with prior left carotid endarterectomy, status post angioplasty and stenting of left carotid artery by neuro-endovascular specialist Dr. Kalin Graham  Chronic right spastic hemiparesis with expressive aphasia in 2004  MRI brain 4/11/19 showed numerous small focal infarctions in the left cerebral hemisphere cortex in the MCA territory  Continue ASA and Plavix   No statin given statin intolerance  Patient progressing with intensive therapy, today is rehab Hospital Day: 5   Anticipate 10-14 days of care in total  Second team conference planned on 4/26     Hypertension  Monitor BPs, continue coreg, hydralazine  allow for permissive HTN for 2 weeks post stroke     Chronic kidney disease stage 3  Baseline Cr likely 1.3-1.4  Avoid nephrotoxins as able     Normocytic anemia  Monitor H/H  Last hgb 9.8     Hyperlipidemia  Will have  bring in lovaza from home     Chronic bilateral lower extremity edema d/t venous insufficiency   Elevate legs, tubigrips     Gout  Continue allopurinol and colchicine     Restless leg syndrome  Continue pramipexole prn and qhs; will have  bring in zaleplon from home     Chronic pain d/t arthritis and spinal stenosis with Hx/o C5-C6 and C6-C7 fusion and multiple previous epidurals  Prn tylenol     DVT prophylaxis  DVT risk factors: relative immobility, age >60, paresis  DVT prophylaxis with asa and  plavix  Mechanical prophylaxis with scds, teds, calf pumps    Patient is participating actively with the Rehabilitation Team.  The documentation was reviewed with current status noted above.  See team conference reports for specific discharge plans.  I have considered how current medical status and co-morbidities are impacting progress towards goals.  The patient has continued functional deficits requiring inpatient rehabilitation.  Continue rehab, medical supervision, nursing cares and comprehensive discharge planning.      Alert and oriented to person, place and time

## 2019-05-29 NOTE — ED PROVIDER NOTE - NSTIMEPROVIDERCAREINITIATE_GEN_ER
02-Oct-2017 18:55 Double Island Pedicle Flap Text: The defect edges were debeveled with a #15 scalpel blade.  Given the location of the defect, shape of the defect and the proximity to free margins a double island pedicle advancement flap was deemed most appropriate.  Using a sterile surgical marker, an appropriate advancement flap was drawn incorporating the defect, outlining the appropriate donor tissue and placing the expected incisions within the relaxed skin tension lines where possible.    The area thus outlined was incised deep to adipose tissue with a #15 scalpel blade.  The skin margins were undermined to an appropriate distance in all directions around the primary defect and laterally outward around the island pedicle utilizing iris scissors.  There was minimal undermining beneath the pedicle flap.

## 2019-06-13 ENCOUNTER — EMERGENCY (EMERGENCY)
Facility: HOSPITAL | Age: 54
LOS: 1 days | Discharge: HOME | End: 2019-06-13
Admitting: EMERGENCY MEDICINE
Payer: COMMERCIAL

## 2019-06-13 VITALS
SYSTOLIC BLOOD PRESSURE: 170 MMHG | RESPIRATION RATE: 18 BRPM | TEMPERATURE: 98 F | DIASTOLIC BLOOD PRESSURE: 89 MMHG | HEART RATE: 87 BPM | OXYGEN SATURATION: 97 %

## 2019-06-13 DIAGNOSIS — Z76.0 ENCOUNTER FOR ISSUE OF REPEAT PRESCRIPTION: ICD-10-CM

## 2019-06-13 DIAGNOSIS — S22.39XA FRACTURE OF ONE RIB, UNSPECIFIED SIDE, INITIAL ENCOUNTER FOR CLOSED FRACTURE: Chronic | ICD-10-CM

## 2019-06-13 DIAGNOSIS — E03.9 HYPOTHYROIDISM, UNSPECIFIED: ICD-10-CM

## 2019-06-13 PROCEDURE — 99283 EMERGENCY DEPT VISIT LOW MDM: CPT

## 2019-06-13 PROCEDURE — 99283 EMERGENCY DEPT VISIT LOW MDM: CPT | Mod: 25

## 2019-06-13 RX ORDER — LEVOTHYROXINE SODIUM 125 MCG
1 TABLET ORAL
Qty: 30 | Refills: 0
Start: 2019-06-13 | End: 2019-07-12

## 2019-06-13 RX ORDER — LEVOTHYROXINE SODIUM 125 MCG
200 TABLET ORAL DAILY
Refills: 0 | Status: DISCONTINUED | OUTPATIENT
Start: 2019-06-13 | End: 2019-06-17

## 2019-06-13 RX ADMIN — Medication 200 MICROGRAM(S): at 23:42

## 2019-06-13 NOTE — ED ADULT NURSE NOTE - OBJECTIVE STATEMENT
Pt presents requesting synthroid refill, states he has been without medication for 3 days. Pt reports feeling generally fatigued and dehydrated. Pt reports no fevers, no N/V/D.

## 2019-06-13 NOTE — ED PROVIDER NOTE - NSFOLLOWUPINSTRUCTIONS_ED_ALL_ED_FT
I have discussed the discharge plan with the patient. The patient agrees with the plan, as discussed.  The patient understands Emergency Department diagnosis is a preliminary diagnosis often based on limited information and that the patient must adhere to the follow-up plan as discussed.  The patient understands that if the symptoms worsen or if prescribed medications do not have the desired/planned effect that the patient may return to the Emergency Department at any time for further evaluation and treatment.          MEDICINE REFILL - AfterCare(R) Instructions(ER/ED)     Medicine Refill    WHAT YOU NEED TO KNOW:    You may have been given a prescription in the emergency department for a few days of your medicine. It is important to refill your medicine before you completely run out. You need to follow up with your healthcare provider for a full prescription within the next few days. You will not be given additional refills in the emergency department.     DISCHARGE INSTRUCTIONS:    Follow up with your healthcare provider: Contact your healthcare provider before you are completely out of medicine. Write down your questions so you remember to ask them during your visits.     Refill tips: Your medicine will treat your condition if you take the medicine regularly. Prevent missed doses by doing the following:     Keep a chart of your medicine. Include all of your current medicines. Write down the name and strength of each medicine, the prescription number, and the number of refills. Also write down the dates of your refills. Ask your pharmacy or insurance provider for other ways to help you keep track of your medicines.      Refill medicines a few days before you run out. This will decrease any problems that will prevent you from getting your medicines on time. Problems include a closed pharmacy, or the pharmacy may have to contact your healthcare provider.      If you know you are going to be traveling, refill your medicines before you leave. You may not be able to get refills if you do not use your local pharmacy. You may need to call your insurance provider to make them aware of your travels.

## 2019-06-13 NOTE — ED ADULT NURSE NOTE - CHPI ED NUR SYMPTOMS NEG
no weakness/no chills/no fever/no nausea/no tingling/no decreased eating/drinking/no dizziness/no vomiting/no pain

## 2019-06-13 NOTE — ED PROVIDER NOTE - NSFOLLOWUPCLINICS_GEN_ALL_ED_FT
[facility]  Family Medicine  [facility]. [address]  [place], [place] [facility]  Phone: [phone]  Fax:   Follow Up Time:

## 2019-06-13 NOTE — ED ADULT NURSE NOTE - NSIMPLEMENTINTERV_GEN_ALL_ED
Implemented All Universal Safety Interventions:  [place] to call system. Call bell, personal items and telephone within reach. Instruct patient to call for assistance. Room bathroom lighting operational. Non-slip footwear when patient is off stretcher. Physically safe environment: no spills, clutter or unnecessary equipment. Stretcher in lowest position, wheels locked, appropriate side rails in place.

## 2019-06-13 NOTE — ED PROVIDER NOTE - MUSCULOSKELETAL, MLM
Spine appears normal, range of motion is not limited, no muscle or joint tenderness. Normal ROM to all extremities. Ambulatory.

## 2019-06-13 NOTE — ED PROVIDER NOTE - OBJECTIVE STATEMENT
54yo male with h/o IVDA, alcohol abuse, and hypothyroidism, in the ER accompanying his girlfriend who is also a pt. Pt states that he needs a refill for his Synthroid because he ran out. Pt has no other acute complaints.

## 2019-07-05 ENCOUNTER — EMERGENCY (EMERGENCY)
Facility: HOSPITAL | Age: 54
LOS: 1 days | Discharge: HOME | End: 2019-07-05
Admitting: EMERGENCY MEDICINE
Payer: SELF-PAY

## 2019-07-05 VITALS
HEART RATE: 74 BPM | SYSTOLIC BLOOD PRESSURE: 180 MMHG | RESPIRATION RATE: 17 BRPM | OXYGEN SATURATION: 99 % | DIASTOLIC BLOOD PRESSURE: 92 MMHG

## 2019-07-05 VITALS
DIASTOLIC BLOOD PRESSURE: 114 MMHG | SYSTOLIC BLOOD PRESSURE: 186 MMHG | RESPIRATION RATE: 18 BRPM | TEMPERATURE: 97 F | WEIGHT: 179.9 LBS | OXYGEN SATURATION: 98 % | HEART RATE: 76 BPM | HEIGHT: 68 IN

## 2019-07-05 DIAGNOSIS — S22.39XA FRACTURE OF ONE RIB, UNSPECIFIED SIDE, INITIAL ENCOUNTER FOR CLOSED FRACTURE: Chronic | ICD-10-CM

## 2019-07-05 DIAGNOSIS — E03.9 HYPOTHYROIDISM, UNSPECIFIED: ICD-10-CM

## 2019-07-05 DIAGNOSIS — R53.1 WEAKNESS: ICD-10-CM

## 2019-07-05 PROCEDURE — 99053 MED SERV 10PM-8AM 24 HR FAC: CPT

## 2019-07-05 PROCEDURE — 99283 EMERGENCY DEPT VISIT LOW MDM: CPT

## 2019-07-05 PROCEDURE — 82962 GLUCOSE BLOOD TEST: CPT

## 2019-07-05 RX ORDER — LEVOTHYROXINE SODIUM 125 MCG
200 TABLET ORAL ONCE
Refills: 0 | Status: COMPLETED | OUTPATIENT
Start: 2019-07-05 | End: 2019-07-05

## 2019-07-05 RX ORDER — AMLODIPINE BESYLATE 2.5 MG/1
10 TABLET ORAL ONCE
Refills: 0 | Status: COMPLETED | OUTPATIENT
Start: 2019-07-05 | End: 2019-07-05

## 2019-07-05 RX ORDER — LEVOTHYROXINE SODIUM 125 MCG
1 TABLET ORAL
Qty: 30 | Refills: 0
Start: 2019-07-05 | End: 2019-08-03

## 2019-07-05 RX ORDER — AMLODIPINE BESYLATE 2.5 MG/1
1 TABLET ORAL
Qty: 30 | Refills: 0
Start: 2019-07-05 | End: 2019-08-03

## 2019-07-05 RX ADMIN — AMLODIPINE BESYLATE 10 MILLIGRAM(S): 2.5 TABLET ORAL at 04:39

## 2019-07-05 RX ADMIN — Medication 200 MICROGRAM(S): at 04:39

## 2019-07-05 NOTE — ED ADULT TRIAGE NOTE - ARRIVAL INFO ADDITIONAL COMMENTS
vague complaints "not feeling well" "had dizziness earlier" "moving around from room to room and lost my medications" reports only med is synthroid vague complaints "not feeling well" "had dizziness earlier" "moving around from room to room and lost my medications" reports only med is synthroid   of note - pt here with minor daughter, asked if someone else can pick her up, reports no one available to pick her up at this time

## 2019-07-05 NOTE — ED PROVIDER NOTE - CLINICAL SUMMARY MEDICAL DECISION MAKING FREE TEXT BOX
patient hyper mobile about ED limiting interview and exam -> adamantly seeking thyroid medication dose and such provided + counseled on HTN meds and follow up ( patient well known for similar type presentations to Ed ) patient hyper mobile about ED limiting interview and exam -> adamantly seeking thyroid medication dose and such provided + counseled on HTN meds  ( long term non compliance though states willing to start again ) and follow up ( patient well known for similar type presentations to Ed )

## 2019-07-05 NOTE — ED ADULT NURSE NOTE - OBJECTIVE STATEMENT
vague complaints "not feeling well" "had dizziness earlier" "moving around from room to room and lost my medications" reports only med is synthroid  nad at present, ambulating without difficulty, steady gait noted

## 2019-07-11 NOTE — ED ADULT TRIAGE NOTE - BP NONINVASIVE SYSTOLIC (MM HG)
135 POCT      Blood Glucose:128      (Normal Range 70-99)    PT:      (Normal Range 9.8 - 13)    INR:      (Normal Range 0.86-1.14)

## 2019-08-03 ENCOUNTER — EMERGENCY (EMERGENCY)
Facility: HOSPITAL | Age: 54
LOS: 1 days | Discharge: HOME | End: 2019-08-03
Attending: EMERGENCY MEDICINE | Admitting: EMERGENCY MEDICINE
Payer: SELF-PAY

## 2019-08-03 VITALS
WEIGHT: 160.06 LBS | RESPIRATION RATE: 17 BRPM | HEART RATE: 75 BPM | SYSTOLIC BLOOD PRESSURE: 124 MMHG | TEMPERATURE: 98 F | DIASTOLIC BLOOD PRESSURE: 80 MMHG | OXYGEN SATURATION: 98 %

## 2019-08-03 DIAGNOSIS — S22.39XA FRACTURE OF ONE RIB, UNSPECIFIED SIDE, INITIAL ENCOUNTER FOR CLOSED FRACTURE: Chronic | ICD-10-CM

## 2019-08-03 PROCEDURE — 96372 THER/PROPH/DIAG INJ SC/IM: CPT

## 2019-08-03 PROCEDURE — 99284 EMERGENCY DEPT VISIT MOD MDM: CPT | Mod: 25

## 2019-08-03 PROCEDURE — 99283 EMERGENCY DEPT VISIT LOW MDM: CPT

## 2019-08-03 RX ORDER — METHOCARBAMOL 500 MG/1
750 TABLET, FILM COATED ORAL ONCE
Refills: 0 | Status: COMPLETED | OUTPATIENT
Start: 2019-08-03 | End: 2019-08-03

## 2019-08-03 RX ORDER — LIDOCAINE 4 G/100G
1 CREAM TOPICAL ONCE
Refills: 0 | Status: COMPLETED | OUTPATIENT
Start: 2019-08-03 | End: 2019-08-03

## 2019-08-03 RX ORDER — LIDOCAINE 4 G/100G
1 CREAM TOPICAL
Qty: 10 | Refills: 0
Start: 2019-08-03 | End: 2019-09-01

## 2019-08-03 RX ORDER — DIAZEPAM 5 MG
5 TABLET ORAL ONCE
Refills: 0 | Status: DISCONTINUED | OUTPATIENT
Start: 2019-08-03 | End: 2019-08-03

## 2019-08-03 RX ORDER — METHOCARBAMOL 500 MG/1
2 TABLET, FILM COATED ORAL
Qty: 30 | Refills: 0
Start: 2019-08-03 | End: 2019-08-07

## 2019-08-03 RX ORDER — KETOROLAC TROMETHAMINE 30 MG/ML
30 SYRINGE (ML) INJECTION ONCE
Refills: 0 | Status: DISCONTINUED | OUTPATIENT
Start: 2019-08-03 | End: 2019-08-03

## 2019-08-03 RX ORDER — IBUPROFEN 200 MG
1 TABLET ORAL
Qty: 30 | Refills: 0
Start: 2019-08-03

## 2019-08-03 RX ADMIN — LIDOCAINE 1 PATCH: 4 CREAM TOPICAL at 20:19

## 2019-08-03 RX ADMIN — METHOCARBAMOL 750 MILLIGRAM(S): 500 TABLET, FILM COATED ORAL at 20:23

## 2019-08-03 RX ADMIN — Medication 30 MILLIGRAM(S): at 20:16

## 2019-08-03 RX ADMIN — Medication 30 MILLIGRAM(S): at 20:23

## 2019-08-03 NOTE — ED PROVIDER NOTE - PHYSICAL EXAMINATION
Constitutional: Well appearing, well nourished, awake, alert, oriented to person, place, time/situation and in no apparent distress.  ENMT: Airway patent. Normal MM  Eyes: Clear bilaterally  Cardiac: Normal rate, regular rhythm.  Heart sounds S1, S2.  No murmurs, rubs or gallops.  Respiratory: Breaths sounds equal and clear b/l. No increased WOB, tachypnea, hypoxia, or accessory mm use. Pt speaks in full sentences.   Gastrointestinal: Abd soft, NT, ND, NABS. No guarding, rebound, or rigidity. No pulsatile abdominal masses. No organomegaly appreciated. No CVAT   Musculoskeletal: Range of motion is not limited. No midline spinal ttp. + mild R lumbar paraspinal mm ttp. + SLR on R only. 5/5 strength in all mm groups in b/l LE. no saddle anesthesia. normal sensation.   Neuro: Alert and oriented x 3, face symmetric and speech fluent. Strength 5/5 x 4 ext and symmetric, nml gross motor movement, nml gait. No focal deficits noted.  Skin: Skin normal color for race, warm, dry and intact. No evidence of rash.  Psych: Alert and oriented to person, place, time/situation. normal mood and affect. no apparent risk to self or others.
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
## 2019-08-03 NOTE — ED ADULT NURSE REASSESSMENT NOTE - NS ED NURSE REASSESS COMMENT FT1
prior to discharge, pt is sleeping, asking for sandwiches. Once discharged pt is aggravated and screaming, security called and escorted out

## 2019-08-03 NOTE — ED PROVIDER NOTE - OBJECTIVE STATEMENT
Pt w/ PMHx hypothyroidism p/w R LBP, non radiating, for weeks. PT reports he has been lifting heavy objects for his pregnant partner, moving things for her. No recent trauma. Pt fell 1 year ago. No radiation of pain down the legs. No numbness/ tingling or weakness in the legs. No bowel or bladder dysfunction. Denies IVDA

## 2019-08-03 NOTE — ED ADULT NURSE REASSESSMENT NOTE - NS ED NURSE REASSESS COMMENT FT1
Patient cleared for safe discharge, refused to leave, verbally abusive to staff.  Security called to escort patient out of ED.
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
## 2019-08-03 NOTE — ED PROVIDER NOTE - NS ED ROS FT
Constitutional: No fever or chills.   Eyes: No pain, blurry vision, or discharge.  ENMT: No hearing changes, pain, discharge or infections. No neck pain or stiffness.  Cardiac: No chest pain, SOB or edema. No chest pain with exertion.  Respiratory: No cough or respiratory distress. No hemoptysis. No history of asthma or RAD.  GI: No nausea, vomiting, diarrhea or abdominal pain.  : No dysuria, frequency or burning.  MS: See HPI  Neuro: No headache or weakness. No LOC.  Skin: No skin rash.   Endocrine: No history of thyroid disease or diabetes.  Except as documented in the HPI, all other systems are negative.

## 2019-08-03 NOTE — ED PROVIDER NOTE - CLINICAL SUMMARY MEDICAL DECISION MAKING FREE TEXT BOX
Pt p/w MSK back strain. No midline ttp. No neurologic signs or symptoms on history or exam. No numbness/tingling or paresthesias. Gait is normal. No bowel or bladder incontinence. No evidence of cord compression or cauda equina syndrome. In addition, there is no fever and no risk factors or evidence of epidural abscess. No evidence of cord transection or metastatic process or other acute spinal cord injury. No evidence of AAA/dissection. Patient has a normal neuro exam and back exam. Pt given pain meds and will re-eval. Anticipate d/c w/ f/u PCP if sx improved, w/ continued tx. Pt p/w MSK back strain. No midline ttp. No neurologic signs or symptoms on history or exam. No numbness/tingling or paresthesias. Gait is normal. No bowel or bladder incontinence. No evidence of cord compression or cauda equina syndrome. In addition, there is no fever and no risk factors or evidence of epidural abscess. No evidence of cord transection or metastatic process or other acute spinal cord injury. No evidence of AAA/dissection. Patient has a normal neuro exam and back exam. Pt BIBA, but ambulated into the ED. Pt disruptive on arrival, yelling at staff, threw can of soda. Pt given pain meds and will re-eval. Anticipate d/c w/ f/u PCP if sx improved, w/ continued tx.

## 2019-08-08 DIAGNOSIS — M54.5 LOW BACK PAIN: ICD-10-CM

## 2019-08-19 ENCOUNTER — EMERGENCY (EMERGENCY)
Facility: HOSPITAL | Age: 54
LOS: 1 days | Discharge: HOME | End: 2019-08-19
Admitting: EMERGENCY MEDICINE
Payer: SELF-PAY

## 2019-08-19 VITALS
SYSTOLIC BLOOD PRESSURE: 146 MMHG | HEIGHT: 68 IN | HEART RATE: 87 BPM | TEMPERATURE: 99 F | DIASTOLIC BLOOD PRESSURE: 91 MMHG | RESPIRATION RATE: 18 BRPM | WEIGHT: 169.09 LBS | OXYGEN SATURATION: 97 %

## 2019-08-19 DIAGNOSIS — S22.39XA FRACTURE OF ONE RIB, UNSPECIFIED SIDE, INITIAL ENCOUNTER FOR CLOSED FRACTURE: Chronic | ICD-10-CM

## 2019-08-19 PROCEDURE — 99283 EMERGENCY DEPT VISIT LOW MDM: CPT

## 2019-08-19 PROCEDURE — 73140 X-RAY EXAM OF FINGER(S): CPT | Mod: 26,RT

## 2019-08-19 PROCEDURE — 73140 X-RAY EXAM OF FINGER(S): CPT

## 2019-08-19 PROCEDURE — 99283 EMERGENCY DEPT VISIT LOW MDM: CPT | Mod: 25

## 2019-08-19 RX ORDER — IBUPROFEN 200 MG
600 TABLET ORAL ONCE
Refills: 0 | Status: COMPLETED | OUTPATIENT
Start: 2019-08-19 | End: 2019-08-19

## 2019-08-19 RX ADMIN — Medication 600 MILLIGRAM(S): at 11:23

## 2019-08-19 NOTE — ED PROVIDER NOTE - OBJECTIVE STATEMENT
The pt is a 54 y/o M, who presents to ED c/o R pinky pain > 1 mon. States that injured it over a month ago, but con't to have pain, wants it evaluated, has been taking aleve w/good relief. Denies numbness or tingling to tip, hand pain, decreased ROM, any acute or worsening symptoms today

## 2019-08-19 NOTE — ED PROVIDER NOTE - CLINICAL SUMMARY MEDICAL DECISION MAKING FREE TEXT BOX
pt c/o r pinky pain x over a month, states that injured it then, con't to have pain, no acute injury or change in symptoms today, has FROM on exam, xray neg for occult fx, placed into finger splint for comfort, to RICE and prn ibuprofen, f/u w/hand surg, pt understands and agrees w/plan
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
## 2019-08-19 NOTE — ED PROVIDER NOTE - NSFOLLOWUPINSTRUCTIONS_ED_ALL_ED_FT
Finger pain  rest, ice, elevate, use finger splint, continue ibuprofen as needed, follow up with hand doctor  R.I.C.E. Treatment  WHAT YOU NEED TO KNOW:  R.I.C.E. treatment is a 4-step process used to decrease swelling and pain caused by an injury. R.I.C.E. stands for rest, ice, compression, and elevation. R.I.C.E. should be done within 24 to 48 hours after an injury.Ice and Elevation  Ice and Elevation  DISCHARGE INSTRUCTIONS  Return to the emergency department if:   Your pain is severe.  You have severe swelling or deformity.  You have numbness in the injured area.  Contact your healthcare provider if:   Your pain and swelling does not go away after a few days.  You have questions or concerns about your condition or care.  How to use R.I.C.E. treatment:   Rest your injured area as directed. You may need to stop using, or keep weight off, the injury for 48 hours or longer. Your healthcare provider may recommend crutches or another device. Return to your usual activities as directed.   Apply ice on your injured area for 15 to 20 minutes every 4 hours or as directed. Use an ice pack, or put crushed ice in a plastic bag. Cover it with a towel. Ice helps prevent tissue damage and decreases swelling and pain.  Compress, or keep pressure on, the injured area. Compression will help decrease swelling and support the injured area. Use an elastic bandage, air stirrup, splint, or sling as directed. If you use an elastic bandage to wrap your injured area, make sure the bandage is not too tight.   Elevate the injured area above the level of your heart as often as you can. This will help decrease swelling and pain. Prop the injured area on pillows or blankets to keep it elevated comfortably.

## 2019-08-19 NOTE — ED PROVIDER NOTE - CARE PROVIDER_API CALL
Jessica Nogueira)  Plastic Surgery; Surgery  [address] [place] [address]  Phone: [phone]  Fax: [phone]  Follow Up Time:

## 2019-08-19 NOTE — ED PROVIDER NOTE - MUSCULOSKELETAL, MLM
R hand: no swelling or discolorations, radial pulse 2+, no tend over metacarpals, + tend over 5th PIP, FROM, + light touch, cap refill < 2 sec, muscle strength 5/5, good resistance

## 2019-08-23 DIAGNOSIS — M79.644 PAIN IN RIGHT FINGER(S): ICD-10-CM

## 2019-08-29 NOTE — ED PROVIDER NOTE - ENMT, MLM
That can be moved up to around the last week of September.
Airway patent, Nasal mucosa clear. Mouth with normal mucosa.

## 2019-09-04 NOTE — ED PROVIDER NOTE - MUSCULOSKELETAL, MLM
Follow-up Visit         Patient: Britta Lee  YOB: 1944  Date of Encounter: 09/04/2019      Chief  Complaint:   Chief Complaint   Patient presents with   • Right Hand - Follow-up, Pain         HPI:  Britta Lee, 75 y.o. female returns today in follow-up with her right hand complaints of tingling and numbness.  She is a known diabetic.  Her symptoms are moderately better after providing cock-up wrist brace the past 4 weeks.  She is not losing sleep at night.    Medical History:  Patient Active Problem List   Diagnosis   • Type 2 diabetes mellitus, uncontrolled, with neuropathy (CMS/HCC)   • Benign essential hypertension   • Coronary atherosclerosis of native coronary vessel   • Bilateral carpal tunnel syndrome   • Iron deficiency anemia due to dietary causes   • Diabetic retinopathy associated with type 2 diabetes mellitus (CMS/HCC)   • Hyperlipidemia   • Sciatic neuropathy   • DDD (degenerative disc disease), lumbar   • Primary osteoarthritis of left knee   • Status post total left knee replacement   • Type 2 diabetes mellitus with chronic kidney disease, with long-term current use of insulin (CMS/HCC)   • Hyperparathyroidism due to renal insufficiency (CMS/HCC)   • Chronic renal disease, stage III (CMS/HCC)   • Venous insufficiency (chronic) (peripheral)   • Hand pain, right     Past Medical History:   Diagnosis Date   • Anemia    • Arthritis    • Carpal tunnel syndrome    • Coronary artery disease    • Diabetes mellitus (CMS/HCC)    • Heart disease    • High cholesterol    • History of pneumonia    • Hypertension    • Insomnia    • Osteoarthritis    • Renal insufficiency    • Sciatica        Social History:  Social History     Socioeconomic History   • Marital status:      Spouse name: natalie   • Number of children: 3   • Years of education: 12   • Highest education level: Not on file   Occupational History   • Occupation: retired   Social Needs   • Financial resource strain: Somewhat  hard   • Food insecurity:     Worry: Never true     Inability: Never true   • Transportation needs:     Medical: Yes     Non-medical: No   Tobacco Use   • Smoking status: Never Smoker   • Smokeless tobacco: Never Used   Substance and Sexual Activity   • Alcohol use: No   • Drug use: No   • Sexual activity: Defer     Birth control/protection: Surgical   Lifestyle   • Physical activity:     Days per week: 0 days     Minutes per session: 0 min   • Stress: Only a little       Surgical History:  Past Surgical History:   Procedure Laterality Date   • ABDOMINAL SURGERY     • APPENDECTOMY     • CARDIAC SURGERY     • CAROTID STENT     • CATARACT EXTRACTION     • CHOLECYSTECTOMY     • ENDOSCOPY AND COLONOSCOPY     • GALLBLADDER SURGERY     • JOINT REPLACEMENT Left 05/02/2017    [facility]  DR JOHNSON  LEFT TOTAL KNEE   • KNEE ARTHROSCOPY W/ MENISCECTOMY Right    • LAPAROSCOPIC TUBAL LIGATION     • SD TOTAL KNEE ARTHROPLASTY Left 5/2/2017    Procedure: TOTAL KNEE ARTHROPLASTY;  Surgeon: Feroz Johnson MD;  Location: [facility];  Service: Orthopedics   • STERILIZATION     • TONSILLECTOMY         Examination:   Bilateral hand evaluation: she has symmetrical strength no thenar atrophy and decreased sensation to her thumb index and middle fingers right hand, she has no symptoms on her left.      Assessment & Plan:   75 y.o. female with complaints of right hand tingling with pain moderate improvement with cock-up wrist brace.  She reports her diabetes is poorly controlled.  Were able to obtain her EMG nerve conduction studies which describe carpal tunnel syndrome the left and describe it is severe.  Clinically she presents with right hand pain and numbness and denies symptoms to her left.  I do not think she needs surgical release and she agrees.  She will continue to wear her brace and she will follow-up as needed.         Diagnosis Plan   1. Hand pain, right               Cc:  Lexie Dolan PA              This document  has been electronically signed by Feroz Johnson MD   September 4, 2019 1:07 PM   right foot with tinea between toes.  between toes 4-5 crack in skin with erythema of proximal 1/2 of dorsum of foot.  tender to touch.  no fluctuance

## 2019-09-07 ENCOUNTER — EMERGENCY (EMERGENCY)
Facility: HOSPITAL | Age: 54
LOS: 1 days | Discharge: HOME | End: 2019-09-07
Admitting: EMERGENCY MEDICINE
Payer: MEDICAID

## 2019-09-07 VITALS
RESPIRATION RATE: 18 BRPM | HEART RATE: 66 BPM | TEMPERATURE: 99 F | OXYGEN SATURATION: 98 % | DIASTOLIC BLOOD PRESSURE: 85 MMHG | SYSTOLIC BLOOD PRESSURE: 136 MMHG

## 2019-09-07 VITALS
DIASTOLIC BLOOD PRESSURE: 89 MMHG | TEMPERATURE: 98 F | SYSTOLIC BLOOD PRESSURE: 170 MMHG | OXYGEN SATURATION: 99 % | RESPIRATION RATE: 20 BRPM | HEART RATE: 71 BPM

## 2019-09-07 DIAGNOSIS — S22.39XA FRACTURE OF ONE RIB, UNSPECIFIED SIDE, INITIAL ENCOUNTER FOR CLOSED FRACTURE: Chronic | ICD-10-CM

## 2019-09-07 DIAGNOSIS — Z88.1 ALLERGY STATUS TO OTHER ANTIBIOTIC AGENTS STATUS: ICD-10-CM

## 2019-09-07 DIAGNOSIS — Z79.899 OTHER LONG TERM (CURRENT) DRUG THERAPY: ICD-10-CM

## 2019-09-07 DIAGNOSIS — M79.644 PAIN IN RIGHT FINGER(S): ICD-10-CM

## 2019-09-07 DIAGNOSIS — Z79.1 LONG TERM (CURRENT) USE OF NON-STEROIDAL ANTI-INFLAMMATORIES (NSAID): ICD-10-CM

## 2019-09-07 DIAGNOSIS — E03.9 HYPOTHYROIDISM, UNSPECIFIED: ICD-10-CM

## 2019-09-07 DIAGNOSIS — Z76.0 ENCOUNTER FOR ISSUE OF REPEAT PRESCRIPTION: ICD-10-CM

## 2019-09-07 DIAGNOSIS — G89.29 OTHER CHRONIC PAIN: ICD-10-CM

## 2019-09-07 PROCEDURE — 99283 EMERGENCY DEPT VISIT LOW MDM: CPT

## 2019-09-07 RX ORDER — IBUPROFEN 200 MG
600 TABLET ORAL ONCE
Refills: 0 | Status: COMPLETED | OUTPATIENT
Start: 2019-09-07 | End: 2019-09-07

## 2019-09-07 RX ORDER — LEVOTHYROXINE SODIUM 125 MCG
1 TABLET ORAL
Qty: 7 | Refills: 0
Start: 2019-09-07 | End: 2019-09-13

## 2019-09-07 RX ORDER — IBUPROFEN 200 MG
1 TABLET ORAL
Qty: 16 | Refills: 0
Start: 2019-09-07 | End: 2019-09-10

## 2019-09-07 RX ORDER — LEVOTHYROXINE SODIUM 125 MCG
200 TABLET ORAL ONCE
Refills: 0 | Status: COMPLETED | OUTPATIENT
Start: 2019-09-07 | End: 2019-09-07

## 2019-09-07 RX ADMIN — Medication 600 MILLIGRAM(S): at 19:09

## 2019-09-07 RX ADMIN — Medication 200 MICROGRAM(S): at 19:09

## 2019-09-07 NOTE — ED PROVIDER NOTE - MUSCULOSKELETAL, MLM
Spine appears normal, range of motion is not limited, no muscle or joint tenderness; r hand: no swelling, no discolorations, no bony tend over metacarpals, + discomfort over 5th pip, FROM, + light touch, cap refill < 2 sec

## 2019-09-07 NOTE — ED ADULT TRIAGE NOTE - CHIEF COMPLAINT QUOTE
pt BIBA c/o right fifth finger pain, requesting med refill for his hypothyroid and requesting pain medications . Taking Synthroid and Motrin. denies falls or injuries, states " I was helping my girlfriend moving and was carrying very heavy furniture now my finger hurts"  no deformity noted, ice provided.

## 2019-09-07 NOTE — ED PROVIDER NOTE - CLINICAL SUMMARY MEDICAL DECISION MAKING FREE TEXT BOX
No
pt seen for r 5th digit pain on 8/19 - had xrays (neg) and placed in finger splint and given f/u at that time but here claiming that lost all info and has not used splint because he decided that he did not need it, has not f/u, no acute injury to digit, no acute c/o - demanding motrin, given hand f/u again, also demanding refill for his thyroid meds - advised that will only be given limited # and must f/u w/pmd for tsh level and any further refills, pt also wanting muscle relaxants for his chronic spasms - noted to be walking around w/o any distress and no emergent indication for muscle relaxants, suspect 2/2 gain for visit

## 2019-09-07 NOTE — ED PROVIDER NOTE - PSYCHIATRIC, MLM
Alert and oriented to person, place, time/situation. aggressive and demanding meds, following provider around demanding meds "before I feel bad"

## 2019-09-07 NOTE — ED ADULT NURSE NOTE - NSIMPLEMENTINTERV_GEN_ALL_ED
Implemented All Universal Safety Interventions:  [place] to call system. Call bell, personal items and telephone within reach. Instruct patient to call for assistance. Room bathroom lighting operational. Non-slip footwear when patient is off stretcher. Physically safe environment: no spills, clutter or unnecessary equipment. Stretcher in lowest position, wheels locked, appropriate side rails in place.

## 2019-09-07 NOTE — ED PROVIDER NOTE - CARE PROVIDER_API CALL
Jessica Nogueira)  Plastic Surgery; Surgery  [address] [place] [address]  Phone: [phone]  Fax: [phone]  Follow Up Time:     Arianne Novoa)  Orthopaedic Surgery  [address]  [place] [address]  Phone: [phone]  Fax: [phone]  Follow Up Time:

## 2019-09-07 NOTE — ED PROVIDER NOTE - NSFOLLOWUPINSTRUCTIONS_ED_ALL_ED_FT
medication refill   chronic finger pain    follow up with hand doctor, pain medication as needed  clinic for any further medication refills

## 2019-09-07 NOTE — ED PROVIDER NOTE - PATIENT PORTAL LINK FT
You can access the FollowMyHealth Patient Portal offered by [facility] by registering at the following website: [email or web address]. By joining [employer]’s FollowMyHealth portal, you will also be able to view your health information using other applications (apps) compatible with our system.

## 2019-09-07 NOTE — ED ADULT NURSE NOTE - CHPI ED NUR SYMPTOMS NEG
no pain/no nausea/no tingling/no dizziness/no fever/no chills/no decreased eating/drinking/no vomiting

## 2019-09-07 NOTE — ED PROVIDER NOTE - OBJECTIVE STATEMENT
The pt is a 54 y/o M, who presents to ED stating "I want motrin and my thyroid medicine" -- seen on 8/19 for r pinky pain (neg xrays) and given hand f/u but states that lost dc papers. Wanting motrin for his pain and levothyroxine 200mcg, states that ran out and needs rx, also wants a muscle relaxant for his chronic spasm pain. Denies any acute injury to finger, acute changes in pain since last visit, decreased ROM, numbness or tingling to finger, fevers, chills, cp, sob

## 2019-09-08 ENCOUNTER — EMERGENCY (EMERGENCY)
Facility: HOSPITAL | Age: 54
LOS: 1 days | Discharge: HOME | End: 2019-09-08
Attending: EMERGENCY MEDICINE | Admitting: EMERGENCY MEDICINE
Payer: MEDICAID

## 2019-09-08 VITALS
HEIGHT: 68 IN | WEIGHT: 179.9 LBS | RESPIRATION RATE: 18 BRPM | TEMPERATURE: 98 F | OXYGEN SATURATION: 100 % | DIASTOLIC BLOOD PRESSURE: 115 MMHG | SYSTOLIC BLOOD PRESSURE: 169 MMHG | HEART RATE: 73 BPM

## 2019-09-08 DIAGNOSIS — S22.39XA FRACTURE OF ONE RIB, UNSPECIFIED SIDE, INITIAL ENCOUNTER FOR CLOSED FRACTURE: Chronic | ICD-10-CM

## 2019-09-08 PROCEDURE — 99053 MED SERV 10PM-8AM 24 HR FAC: CPT

## 2019-09-08 PROCEDURE — 99283 EMERGENCY DEPT VISIT LOW MDM: CPT

## 2019-09-09 PROCEDURE — 99281 EMR DPT VST MAYX REQ PHY/QHP: CPT

## 2019-09-09 RX ORDER — LEVOTHYROXINE SODIUM 125 MCG
1 TABLET ORAL
Qty: 7 | Refills: 0
Start: 2019-09-09 | End: 2019-09-15

## 2019-09-09 RX ORDER — IBUPROFEN 200 MG
600 TABLET ORAL ONCE
Refills: 0 | Status: COMPLETED | OUTPATIENT
Start: 2019-09-09 | End: 2019-09-09

## 2019-09-09 RX ORDER — LEVOTHYROXINE SODIUM 125 MCG
200 TABLET ORAL ONCE
Refills: 0 | Status: COMPLETED | OUTPATIENT
Start: 2019-09-09 | End: 2019-09-09

## 2019-09-09 RX ORDER — METHOCARBAMOL 500 MG/1
750 TABLET, FILM COATED ORAL ONCE
Refills: 0 | Status: COMPLETED | OUTPATIENT
Start: 2019-09-09 | End: 2019-09-09

## 2019-09-09 RX ADMIN — METHOCARBAMOL 750 MILLIGRAM(S): 500 TABLET, FILM COATED ORAL at 00:32

## 2019-09-09 RX ADMIN — Medication 200 MICROGRAM(S): at 00:32

## 2019-09-09 RX ADMIN — Medication 600 MILLIGRAM(S): at 00:32

## 2019-09-09 NOTE — ED PROVIDER NOTE - PHYSICAL EXAMINATION
CONSTITUTIONAL: Well-appearing; well-nourished; in no apparent distress.   HEAD: Normocephalic; atraumatic.   EYES:  conjunctiva and sclera clear  ENT: normal nose; no rhinorrhea;  NECK: Supple; full ROM  RESPIRATORY: Breathing easily; no resp difficulty  EXT: No cyanosis or edema; able to range R pinky, can make a fist, no obvious deformity  SKIN: Normal for age and race; warm; dry; good turgor; no apparent lesions or rash.   NEURO: A & O x 3; face symmetric; grossly unremarkable.   PSYCHOLOGICAL: The patient’s mood and manner are appropriate.

## 2019-09-09 NOTE — ED PROVIDER NOTE - PATIENT PORTAL LINK FT
You can access the FollowMyHealth Patient Portal offered by [facility] by registering at the following website: [email or web address]. By joining [employer]’s FollowMyHealth portal, you will also be able to view your health information using other applications (apps) compatible with our system.

## 2019-09-09 NOTE — ED PROVIDER NOTE - NSFOLLOWUPINSTRUCTIONS_ED_ALL_ED_FT
WHAT YOU NEED TO KNOW:    You may have been given a prescription in the emergency department for a few days of your medicine. It is important to refill your medicine before you completely run out. You need to follow up with your healthcare provider for a full prescription within the next few days. You will not be given additional refills in the emergency department.     DISCHARGE INSTRUCTIONS:    Follow up with your healthcare provider: Contact your healthcare provider before you are completely out of medicine. Write down your questions so you remember to ask them during your visits.     Refill tips: Your medicine will treat your condition if you take the medicine regularly. Prevent missed doses by doing the following:     Keep a chart of your medicine. Include all of your current medicines. Write down the name and strength of each medicine, the prescription number, and the number of refills. Also write down the dates of your refills. Ask your pharmacy or insurance provider for other ways to help you keep track of your medicines.      Refill medicines a few days before you run out. This will decrease any problems that will prevent you from getting your medicines on time. Problems include a closed pharmacy, or the pharmacy may have to contact your healthcare provider.      If you know you are going to be traveling, refill your medicines before you leave. You may not be able to get refills if you do not use your local pharmacy. You may need to call your insurance provider to make them aware of your travels.

## 2019-09-09 NOTE — ED PROVIDER NOTE - CARE PROVIDER_API CALL
Tino Osman)  Plastic Surgery; Surgery of the Hand  [address], [place] [address]  Phone: [phone]  Fax: [phone]  Follow Up Time:
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
## 2019-09-09 NOTE — ED PROVIDER NOTE - CLINICAL SUMMARY MEDICAL DECISION MAKING FREE TEXT BOX
here asking for meds for pain of his neck and refill of thyroid meds. states rx sent yesterday was to wrong pharmacy, wants it sent to the one on [address] [facility]. [place] home in [place]

## 2019-09-14 DIAGNOSIS — Z76.0 ENCOUNTER FOR ISSUE OF REPEAT PRESCRIPTION: ICD-10-CM

## 2019-12-07 ENCOUNTER — EMERGENCY (EMERGENCY)
Facility: HOSPITAL | Age: 54
LOS: 1 days | Discharge: HOME | End: 2019-12-07
Admitting: EMERGENCY MEDICINE
Payer: COMMERCIAL

## 2019-12-07 VITALS
RESPIRATION RATE: 19 BRPM | HEART RATE: 92 BPM | TEMPERATURE: 98 F | SYSTOLIC BLOOD PRESSURE: 164 MMHG | WEIGHT: 179.9 LBS | HEIGHT: 68 IN | DIASTOLIC BLOOD PRESSURE: 107 MMHG | OXYGEN SATURATION: 97 %

## 2019-12-07 DIAGNOSIS — S22.39XA FRACTURE OF ONE RIB, UNSPECIFIED SIDE, INITIAL ENCOUNTER FOR CLOSED FRACTURE: Chronic | ICD-10-CM

## 2019-12-07 PROCEDURE — 99283 EMERGENCY DEPT VISIT LOW MDM: CPT | Mod: 25

## 2019-12-07 PROCEDURE — 12011 RPR F/E/E/N/L/M 2.5 CM/<: CPT
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
## 2019-12-08 PROCEDURE — 90471 IMMUNIZATION ADMIN: CPT

## 2019-12-08 PROCEDURE — 99283 EMERGENCY DEPT VISIT LOW MDM: CPT | Mod: 25

## 2019-12-08 PROCEDURE — 90715 TDAP VACCINE 7 YRS/> IM: CPT

## 2019-12-08 PROCEDURE — 12011 RPR F/E/E/N/L/M 2.5 CM/<: CPT

## 2019-12-08 RX ORDER — LEVOTHYROXINE SODIUM 125 MCG
200 TABLET ORAL ONCE
Refills: 0 | Status: COMPLETED | OUTPATIENT
Start: 2019-12-08 | End: 2019-12-08

## 2019-12-08 RX ORDER — TETANUS TOXOID, REDUCED DIPHTHERIA TOXOID AND ACELLULAR PERTUSSIS VACCINE, ADSORBED 5; 2.5; 8; 8; 2.5 [IU]/.5ML; [IU]/.5ML; UG/.5ML; UG/.5ML; UG/.5ML
0.5 SUSPENSION INTRAMUSCULAR ONCE
Refills: 0 | Status: COMPLETED | OUTPATIENT
Start: 2019-12-08 | End: 2019-12-08

## 2019-12-08 RX ORDER — IBUPROFEN 200 MG
800 TABLET ORAL ONCE
Refills: 0 | Status: COMPLETED | OUTPATIENT
Start: 2019-12-08 | End: 2019-12-08

## 2019-12-08 RX ORDER — LIDOCAINE HCL 20 MG/ML
10 VIAL (ML) INJECTION ONCE
Refills: 0 | Status: COMPLETED | OUTPATIENT
Start: 2019-12-08 | End: 2019-12-08

## 2019-12-08 RX ADMIN — Medication 200 MICROGRAM(S): at 00:42

## 2019-12-08 RX ADMIN — Medication 800 MILLIGRAM(S): at 01:11

## 2019-12-08 RX ADMIN — Medication 10 MILLILITER(S): at 00:43

## 2019-12-08 RX ADMIN — TETANUS TOXOID, REDUCED DIPHTHERIA TOXOID AND ACELLULAR PERTUSSIS VACCINE, ADSORBED 0.5 MILLILITER(S): 5; 2.5; 8; 8; 2.5 SUSPENSION INTRAMUSCULAR at 00:42

## 2019-12-08 NOTE — ED PROVIDER NOTE - PATIENT PORTAL LINK FT
You can access the FollowMyHealth Patient Portal offered by [facility] by registering at the following website: [email or web address]. By joining [employer]’s FollowMyHealth portal, you will also be able to view your health information using other applications (apps) compatible with our system.
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
## 2019-12-08 NOTE — ED PROVIDER NOTE - PHYSICAL EXAMINATION
Vitals reviewed  Gen: well appearing, nad, speaking in full sentences  Skin: wwp   HEENT: abrasion R cheek, 2cm inferior lip laceration, no facial ttp, eomi, perrla, mmm, dentition intact  Neck/Back: no midline ttp/step off  CV: rrr  Resp: ctab, no w/r/r  Abd: soft/nt  Ext: FROM throughout, no peripheral edema  Neuro: alert/oriented, no focal deficits, steady gait
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
## 2019-12-08 NOTE — ED PROVIDER NOTE - NS ED MD EM SELECTION
From: Mirlande Lynne  To: LOUIS Trammell  Sent: 9/24/2019 10:34 AM CDT  Subject: After-Visit Question    I am breaking down nutrition for everything I eat and as well reciepes. Besides carbs and calories I am tracking saturated fat. Is that right or should I include something else?   [id number] Exp Problem Focused - Low Complex

## 2019-12-08 NOTE — ED PROVIDER NOTE - NSFOLLOWUPINSTRUCTIONS_ED_ALL_ED_FT
Keep wound clean and dry, the suture will dissolve on their own.  Take tylenol or motrin for pain and follow up with primary doctor in one week.  Return to ED for fever, discharge from wound, severe headaches, vomiting, dizziness    Laceration    A laceration is a cut that goes through all of the layers of the skin and into the tissue that is right under the skin. Some lacerations heal on their own. Others need to be closed with skin adhesive strips, skin glue, stitches (sutures), or staples. Proper laceration care minimizes the risk of infection and helps the laceration to heal better.  If non-absorbable stitches or staples have been placed, they must be taken out within the time frame instructed by your healthcare provider.    SEEK IMMEDIATE MEDICAL CARE IF YOU HAVE ANY OF THE FOLLOWING SYMPTOMS: swelling around the wound, worsening pain, drainage from the wound, red streaking going away from your wound, inability to move finger or toe near the laceration, or discoloration of skin near the laceration.    Closed Head Injury    A closed head injury is an injury to your head that may or may not involve a traumatic brain injury (TBI). Symptoms of TBI can be short or long lasting and include headache, dizziness, interference with memory or speech, fatigue, confusion, changes in sleep, mood changes, nausea, depression/anxiety, and dulling of senses. Make sure to obtain proper rest which includes getting plenty of sleep, avoiding excessive visual stimulation, and avoiding activities that may cause physical or mental stress. Avoid any situation where there is potential for another head injury, including sports.    SEEK IMMEDIATE MEDICAL CARE IF YOU HAVE ANY OF THE FOLLOWING SYMPTOMS: unusual drowsiness, vomiting, severe dizziness, seizures, lightheadedness, muscular weakness, different pupil sizes, visual changes, or clear or bloody discharge from your ears or nose.

## 2019-12-12 DIAGNOSIS — Z23 ENCOUNTER FOR IMMUNIZATION: ICD-10-CM

## 2019-12-12 DIAGNOSIS — T74.11XA ADULT PHYSICAL ABUSE, CONFIRMED, INITIAL ENCOUNTER: ICD-10-CM
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
## 2019-12-12 DIAGNOSIS — S01.511A LACERATION WITHOUT FOREIGN BODY OF LIP, INITIAL ENCOUNTER: ICD-10-CM

## 2020-01-06 NOTE — ED ADULT TRIAGE NOTE - OTHER COMPLAINTS
The patient is a 70y Male complaining of fall. 74 Patient reports pain to right hand pinky x1 month.

## 2020-01-23 ENCOUNTER — EMERGENCY (EMERGENCY)
Facility: HOSPITAL | Age: 55
LOS: 1 days | Discharge: HOME | End: 2020-01-23
Admitting: EMERGENCY MEDICINE
Payer: COMMERCIAL

## 2020-01-23 VITALS
DIASTOLIC BLOOD PRESSURE: 92 MMHG | TEMPERATURE: 98 F | HEIGHT: 68 IN | SYSTOLIC BLOOD PRESSURE: 134 MMHG | HEART RATE: 79 BPM | RESPIRATION RATE: 18 BRPM | OXYGEN SATURATION: 98 % | WEIGHT: 179.9 LBS

## 2020-01-23 DIAGNOSIS — S22.39XA FRACTURE OF ONE RIB, UNSPECIFIED SIDE, INITIAL ENCOUNTER FOR CLOSED FRACTURE: Chronic | ICD-10-CM

## 2020-01-23 PROCEDURE — 99283 EMERGENCY DEPT VISIT LOW MDM: CPT

## 2020-01-23 RX ORDER — IBUPROFEN 200 MG
600 TABLET ORAL ONCE
Refills: 0 | Status: COMPLETED | OUTPATIENT
Start: 2020-01-23 | End: 2020-01-23

## 2020-01-23 RX ADMIN — Medication 600 MILLIGRAM(S): at 05:28

## 2020-01-23 NOTE — ED PROVIDER NOTE - NSFOLLOWUPCLINICS_GEN_ALL_ED_FT
[facility]  Podiatry  [address] [place] [address]  Phone: [phone]  Fax:   Follow Up Time:
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
## 2020-01-23 NOTE — ED PROVIDER NOTE - NS ED ROS FT
CONSTITUTIONAL:  no fever  NEURO: no tingling or numbness  EYE:  ENT:  CV:  PULM:  GI:  :  SKIN: no rash or bruising  MSK:  right foot pain  IMMUN:  ENDOCRINE:

## 2020-01-23 NOTE — ED PROVIDER NOTE - PATIENT PORTAL LINK FT
You can access the FollowMyHealth Patient Portal offered by [facility] by registering at the following website: [email or web address]. By joining [employer]’s FollowMyHealth portal, you will also be able to view your health information using other applications (apps) compatible with our system.
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
## 2020-01-23 NOTE — ED ADULT NURSE NOTE - NSIMPLEMENTINTERV_GEN_ALL_ED
Implemented All Universal Safety Interventions:  [place] to call system. Call bell, personal items and telephone within reach. Instruct patient to call for assistance. Room bathroom lighting operational. Non-slip footwear when patient is off stretcher. Physically safe environment: no spills, clutter or unnecessary equipment. Stretcher in lowest position, wheels locked, appropriate side rails in place.

## 2020-01-23 NOTE — ED ADULT NURSE NOTE - CHPI ED NUR SYMPTOMS NEG
no fever/no weakness/no difficulty bearing weight/no stiffness/no tingling/no deformity/no abrasion/no numbness/no back pain/no bruising
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
## 2020-01-23 NOTE — ED PROVIDER NOTE - PHYSICAL EXAMINATION
GENERAL:  alert nad  HEAD:  NCAT  EYE:  ENT:  CV:  RRR  PULM:  GI:  :  SKIN: normal color & turgor, no rash, no bruising.    MSK:  nonantalgic gait.  no tenderness of foot.  Strong DP and PT pulses, foot and toes WWP.   NEURO: alert and oriented, gait steady, speech clear

## 2020-01-30 DIAGNOSIS — M79.671 PAIN IN RIGHT FOOT: ICD-10-CM

## 2020-02-04 ENCOUNTER — EMERGENCY (EMERGENCY)
Facility: HOSPITAL | Age: 55
LOS: 1 days | Discharge: HOME | End: 2020-02-04
Admitting: EMERGENCY MEDICINE
Payer: COMMERCIAL

## 2020-02-04 VITALS
DIASTOLIC BLOOD PRESSURE: 80 MMHG | RESPIRATION RATE: 18 BRPM | HEART RATE: 90 BPM | TEMPERATURE: 98 F | OXYGEN SATURATION: 97 % | SYSTOLIC BLOOD PRESSURE: 160 MMHG

## 2020-02-04 DIAGNOSIS — S22.39XA FRACTURE OF ONE RIB, UNSPECIFIED SIDE, INITIAL ENCOUNTER FOR CLOSED FRACTURE: Chronic | ICD-10-CM

## 2020-02-04 PROCEDURE — 99284 EMERGENCY DEPT VISIT MOD MDM: CPT

## 2020-02-04 PROCEDURE — 99283 EMERGENCY DEPT VISIT LOW MDM: CPT

## 2020-02-04 RX ORDER — CYCLOBENZAPRINE HYDROCHLORIDE 10 MG/1
1 TABLET, FILM COATED ORAL
Qty: 15 | Refills: 0
Start: 2020-02-04 | End: 2020-02-08

## 2020-02-04 RX ORDER — DICLOFENAC SODIUM 75 MG/1
50 TABLET, DELAYED RELEASE ORAL ONCE
Refills: 0 | Status: COMPLETED | OUTPATIENT
Start: 2020-02-04 | End: 2020-02-04

## 2020-02-04 RX ORDER — DICLOFENAC SODIUM 75 MG/1
1 TABLET, DELAYED RELEASE ORAL
Qty: 10 | Refills: 0
Start: 2020-02-04 | End: 2020-02-08

## 2020-02-04 RX ORDER — CYCLOBENZAPRINE HYDROCHLORIDE 10 MG/1
10 TABLET, FILM COATED ORAL ONCE
Refills: 0 | Status: COMPLETED | OUTPATIENT
Start: 2020-02-04 | End: 2020-02-04

## 2020-02-04 RX ADMIN — CYCLOBENZAPRINE HYDROCHLORIDE 10 MILLIGRAM(S): 10 TABLET, FILM COATED ORAL at 06:10

## 2020-02-04 RX ADMIN — DICLOFENAC SODIUM 50 MILLIGRAM(S): 75 TABLET, DELAYED RELEASE ORAL at 06:10

## 2020-02-04 NOTE — ED PROVIDER NOTE - PATIENT PORTAL LINK FT
You can access the FollowMyHealth Patient Portal offered by [facility] by registering at the following website: [email or web address]. By joining [email or web address]’s FollowMyHealth portal, you will also be able to view your health information using other applications (apps) compatible with our system.

## 2020-02-04 NOTE — ED ADULT NURSE NOTE - NSIMPLEMENTINTERV_GEN_ALL_ED
Implemented All Universal Safety Interventions:  [place] to call system. Call bell, personal items and telephone within reach. Instruct patient to call for assistance. Room bathroom lighting operational. Non-slip footwear when patient is off stretcher. Physically safe environment: no spills, clutter or unnecessary equipment. Stretcher in lowest position, wheels locked, appropriate side rails in place.

## 2020-02-04 NOTE — ED PROVIDER NOTE - CLINICAL SUMMARY MEDICAL DECISION MAKING FREE TEXT BOX
54 year old male presenting to the ed with left lower back pain. states that it began 1 week ago, improves with icy hot. PE patient appears well, non-toxic. ttp left lower back. Patient currently does not not have any symptoms concerning for cauda equina syndrome such as saddle anesthesia, bowel or bladder incontinence or abscess. Patient does not endorse trauma to the area and therefore do not believe that imaging is warrented at this time. No history of iv drug use or history of cancer. No note of fever on vitals. Believe that pain is most likeley muscular in nature. High sensitivity neurologic exam does not exhibit deficit on physical examination. Patient was advised to avoid driving, operating heavy machinery, or drinking alcohol when taking pain medication and/or muscle relaxers and to avoid heavy lifting and pain exacerbatieng movement. Patient is advised to use heat as well as ice and massage and stay active. Patient was advised to avoid pain exacerbation activities as well as avoid prolonged sitting, standing and bed rest. ED evaluation and management discussed with the patient and family (if available) in detail.  Close PMD follow up encouraged.  Strict ED return instructions discussed in detail and patient given the opportunity to ask any questions about their discharge diagnosis and instructions. Patient verbalized understanding. Patient is agreeable to plan.
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
## 2020-02-11 DIAGNOSIS — M54.5 LOW BACK PAIN: ICD-10-CM

## 2020-03-01 ENCOUNTER — EMERGENCY (EMERGENCY)
Facility: HOSPITAL | Age: 55
LOS: 1 days | Discharge: HOME | End: 2020-03-01
Admitting: EMERGENCY MEDICINE
Payer: MEDICAID

## 2020-03-01 VITALS
WEIGHT: 179.9 LBS | RESPIRATION RATE: 16 BRPM | HEIGHT: 68 IN | HEART RATE: 82 BPM | DIASTOLIC BLOOD PRESSURE: 99 MMHG | OXYGEN SATURATION: 98 % | TEMPERATURE: 98 F | SYSTOLIC BLOOD PRESSURE: 146 MMHG

## 2020-03-01 DIAGNOSIS — S22.39XA FRACTURE OF ONE RIB, UNSPECIFIED SIDE, INITIAL ENCOUNTER FOR CLOSED FRACTURE: Chronic | ICD-10-CM

## 2020-03-01 PROCEDURE — 99284 EMERGENCY DEPT VISIT MOD MDM: CPT

## 2020-03-01 PROCEDURE — 99283 EMERGENCY DEPT VISIT LOW MDM: CPT | Mod: 25

## 2020-03-01 PROCEDURE — 96372 THER/PROPH/DIAG INJ SC/IM: CPT

## 2020-03-01 RX ORDER — KETOROLAC TROMETHAMINE 30 MG/ML
30 SYRINGE (ML) INJECTION ONCE
Refills: 0 | Status: DISCONTINUED | OUTPATIENT
Start: 2020-03-01 | End: 2020-03-01

## 2020-03-01 RX ORDER — LIDOCAINE 4 G/100G
1 CREAM TOPICAL ONCE
Refills: 0 | Status: COMPLETED | OUTPATIENT
Start: 2020-03-01 | End: 2020-03-01

## 2020-03-01 RX ORDER — LEVOTHYROXINE SODIUM 125 MCG
1 TABLET ORAL
Qty: 14 | Refills: 0
Start: 2020-03-01 | End: 2020-03-14

## 2020-03-01 RX ADMIN — Medication 30 MILLIGRAM(S): at 10:28

## 2020-03-01 RX ADMIN — LIDOCAINE 1 PATCH: 4 CREAM TOPICAL at 10:32

## 2020-03-01 RX ADMIN — Medication 30 MILLIGRAM(S): at 10:30

## 2020-03-01 NOTE — ED PROVIDER NOTE - NSFOLLOWUPINSTRUCTIONS_ED_ALL_ED_FT
Medicine Refill    WHAT YOU NEED TO KNOW:    You may have been given a prescription in the emergency department for a few days of your medicine. It is important to refill your medicine before you completely run out. You need to follow up with your healthcare provider for a full prescription within the next few days. You will not be given additional refills in the emergency department.     DISCHARGE INSTRUCTIONS:    Follow up with your healthcare provider: Contact your healthcare provider before you are completely out of medicine. Write down your questions so you remember to ask them during your visits.     Refill tips: Your medicine will treat your condition if you take the medicine regularly. Prevent missed doses by doing the following:     Keep a chart of your medicine. Include all of your current medicines. Write down the name and strength of each medicine, the prescription number, and the number of refills. Also write down the dates of your refills. Ask your pharmacy or insurance provider for other ways to help you keep track of your medicines.      Refill medicines a few days before you run out. This will decrease any problems that will prevent you from getting your medicines on time. Problems include a closed pharmacy, or the pharmacy may have to contact your healthcare provider.      If you know you are going to be traveling, refill your medicines before you leave. You may not be able to get refills if you do not use your local pharmacy. You may need to call your insurance provider to make them aware of your travels.

## 2020-03-01 NOTE — ED ADULT NURSE NOTE - CHPI ED NUR SYMPTOMS NEG
no bladder dysfunction/no difficulty bearing weight/no constipation/no fatigue/no anorexia/no tingling/no neck tenderness/no bowel dysfunction/no motor function loss/no numbness
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
## 2020-03-01 NOTE — ED PROVIDER NOTE - PATIENT PORTAL LINK FT
You can access the FollowMyHealth Patient Portal offered by [facility] by registering at the following website: [email or web address]. By joining [employer]’s FollowMyHealth portal, you will also be able to view your health information using other applications (apps) compatible with our system.

## 2020-03-01 NOTE — ED PROVIDER NOTE - MUSCULOSKELETAL, MLM
no midline spine tenderness, no paraspinal tenderness, range of motion is not limited, no muscle or joint tenderness

## 2020-03-03 ENCOUNTER — EMERGENCY (EMERGENCY)
Facility: HOSPITAL | Age: 55
LOS: 1 days | Discharge: HOME | End: 2020-03-03
Admitting: EMERGENCY MEDICINE
Payer: MEDICAID

## 2020-03-03 VITALS
SYSTOLIC BLOOD PRESSURE: 159 MMHG | HEART RATE: 83 BPM | OXYGEN SATURATION: 99 % | TEMPERATURE: 98 F | HEIGHT: 68 IN | WEIGHT: 190.04 LBS | DIASTOLIC BLOOD PRESSURE: 103 MMHG | RESPIRATION RATE: 16 BRPM

## 2020-03-03 DIAGNOSIS — S22.39XA FRACTURE OF ONE RIB, UNSPECIFIED SIDE, INITIAL ENCOUNTER FOR CLOSED FRACTURE: Chronic | ICD-10-CM

## 2020-03-03 PROCEDURE — 99283 EMERGENCY DEPT VISIT LOW MDM: CPT

## 2020-03-03 PROCEDURE — 99284 EMERGENCY DEPT VISIT MOD MDM: CPT

## 2020-03-03 RX ORDER — IBUPROFEN 200 MG
1 TABLET ORAL
Qty: 15 | Refills: 0
Start: 2020-03-03 | End: 2020-03-07

## 2020-03-03 RX ORDER — LIDOCAINE 4 G/100G
2 CREAM TOPICAL ONCE
Refills: 0 | Status: COMPLETED | OUTPATIENT
Start: 2020-03-03 | End: 2020-03-03

## 2020-03-03 RX ORDER — METHOCARBAMOL 500 MG/1
2 TABLET, FILM COATED ORAL
Qty: 18 | Refills: 0
Start: 2020-03-03 | End: 2020-03-05

## 2020-03-03 RX ORDER — IBUPROFEN 200 MG
800 TABLET ORAL ONCE
Refills: 0 | Status: COMPLETED | OUTPATIENT
Start: 2020-03-03 | End: 2020-03-03

## 2020-03-03 RX ORDER — METHOCARBAMOL 500 MG/1
1000 TABLET, FILM COATED ORAL ONCE
Refills: 0 | Status: COMPLETED | OUTPATIENT
Start: 2020-03-03 | End: 2020-03-03

## 2020-03-03 RX ADMIN — METHOCARBAMOL 1000 MILLIGRAM(S): 500 TABLET, FILM COATED ORAL at 08:52

## 2020-03-03 RX ADMIN — Medication 800 MILLIGRAM(S): at 08:51

## 2020-03-03 RX ADMIN — LIDOCAINE 2 PATCH: 4 CREAM TOPICAL at 08:51

## 2020-03-03 NOTE — ED PROVIDER NOTE - CARE PROVIDER_API CALL
Tru Gabriel)  Internal Medicine  [address] [place] [address]  Phone: [phone]  Fax: [phone]  Follow Up Time:
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
## 2020-03-03 NOTE — ED ADULT NURSE NOTE - CHPI ED NUR SYMPTOMS NEG
no motor function loss/no neck tenderness/no numbness/no bowel dysfunction/no difficulty bearing weight/no constipation/no tingling/no fatigue

## 2020-03-03 NOTE — ED PROVIDER NOTE - PATIENT PORTAL LINK FT
You can access the FollowMyHealth Patient Portal offered by [facility] by registering at the following website: [email or web address]. By joining [employer]’s FollowMyHealth portal, you will also be able to view your health information using other applications (apps) compatible with our system.
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
## 2020-03-03 NOTE — ED PROVIDER NOTE - PHYSICAL EXAMINATION
CONSTITUTIONAL: Well-appearing; well-nourished; in no apparent distress.   HEAD: Normocephalic; atraumatic.   EYES: PERRL; EOM intact; conjunctiva and sclera clear  ENT: normal nose; no rhinorrhea; normal pharynx with no erythema or lesions.   NECK: Supple; non-tender; no LAD  CARDIOVASCULAR: Normal S1, S2; no murmurs, rubs, or gallops. Regular rate and rhythm.   RESPIRATORY: Breathing easily; breath sounds clear and equal bilaterally; no wheezes, rhonchi, or rales.  GI: Soft; non-distended; non-tender; no palpable organomegaly.   MSK: FROM at all extremities, +tenderness over R scapula and R shoulder, +tenderness t o L lumbar paraspinal muscles, no midline tenderness   EXT: No cyanosis or edema; N/V intact  SKIN: Normal for age and race; warm; dry; good turgor; no apparent lesions or rash.   NEURO: A & O x 3; face symmetric; grossly unremarkable.   PSYCHOLOGICAL: The patient’s mood and manner are appropriate.
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
## 2020-03-03 NOTE — ED PROVIDER NOTE - NSFOLLOWUPCLINICS_GEN_ALL_ED_FT
[facility]AND[facility] [address] [place] [address]  Family Medicine  [address] [place] [address]  Phone: [phone]  Fax: [phone]  Follow Up Time:

## 2020-03-07 DIAGNOSIS — M25.511 PAIN IN RIGHT SHOULDER: ICD-10-CM

## 2020-03-07 DIAGNOSIS — Z88.1 ALLERGY STATUS TO OTHER ANTIBIOTIC AGENTS STATUS: ICD-10-CM

## 2020-03-07 DIAGNOSIS — M54.9 DORSALGIA, UNSPECIFIED: ICD-10-CM

## 2020-03-07 DIAGNOSIS — Z76.0 ENCOUNTER FOR ISSUE OF REPEAT PRESCRIPTION: ICD-10-CM

## 2020-03-07 DIAGNOSIS — M54.5 LOW BACK PAIN: ICD-10-CM

## 2020-03-07 DIAGNOSIS — M54.6 PAIN IN THORACIC SPINE: ICD-10-CM

## 2020-03-07 DIAGNOSIS — Z88.0 ALLERGY STATUS TO PENICILLIN: ICD-10-CM

## 2020-03-07 DIAGNOSIS — Z88.8 ALLERGY STATUS TO OTHER DRUGS, MEDICAMENTS AND BIOLOGICAL SUBSTANCES: ICD-10-CM

## 2020-03-14 ENCOUNTER — EMERGENCY (EMERGENCY)
Facility: HOSPITAL | Age: 55
LOS: 1 days | Discharge: HOME | End: 2020-03-14
Admitting: EMERGENCY MEDICINE
Payer: MEDICAID

## 2020-03-14 VITALS
HEART RATE: 88 BPM | OXYGEN SATURATION: 100 % | TEMPERATURE: 98 F | DIASTOLIC BLOOD PRESSURE: 89 MMHG | RESPIRATION RATE: 18 BRPM | SYSTOLIC BLOOD PRESSURE: 137 MMHG

## 2020-03-14 DIAGNOSIS — F17.210 NICOTINE DEPENDENCE, CIGARETTES, UNCOMPLICATED: ICD-10-CM

## 2020-03-14 DIAGNOSIS — Z88.1 ALLERGY STATUS TO OTHER ANTIBIOTIC AGENTS STATUS: ICD-10-CM

## 2020-03-14 DIAGNOSIS — Z86.59 PERSONAL HISTORY OF OTHER MENTAL AND BEHAVIORAL DISORDERS: ICD-10-CM

## 2020-03-14 DIAGNOSIS — E03.9 HYPOTHYROIDISM, UNSPECIFIED: ICD-10-CM

## 2020-03-14 DIAGNOSIS — G89.29 OTHER CHRONIC PAIN: ICD-10-CM

## 2020-03-14 DIAGNOSIS — M25.511 PAIN IN RIGHT SHOULDER: ICD-10-CM

## 2020-03-14 DIAGNOSIS — S22.39XA FRACTURE OF ONE RIB, UNSPECIFIED SIDE, INITIAL ENCOUNTER FOR CLOSED FRACTURE: Chronic | ICD-10-CM

## 2020-03-14 DIAGNOSIS — Z79.899 OTHER LONG TERM (CURRENT) DRUG THERAPY: ICD-10-CM

## 2020-03-14 PROCEDURE — 99283 EMERGENCY DEPT VISIT LOW MDM: CPT

## 2020-03-14 RX ORDER — LEVOTHYROXINE SODIUM 125 MCG
1 TABLET ORAL
Qty: 14 | Refills: 0
Start: 2020-03-14 | End: 2020-03-27

## 2020-03-14 RX ADMIN — Medication 250 MILLIGRAM(S): at 15:06

## 2020-03-14 NOTE — ED PROVIDER NOTE - CARE PLAN
Principal Discharge DX:	Chronic right shoulder pain  Secondary Diagnosis:	Hypothyroidism, unspecified type

## 2020-03-14 NOTE — ED PROVIDER NOTE - PHYSICAL EXAMINATION
VITAL SIGNS: I have reviewed nursing notes and confirm.  CONSTITUTIONAL: Well-developed; well-nourished; in no acute distress.   SKIN:  warm and dry, no acute rash.   HEAD:  normocephalic, atraumatic.  EYES: EOM intact; conjunctiva and sclera clear.  ENT: No nasal discharge; airway clear.   NECK: Supple; non tender.  CARD: Regular rate and rhythm.   RESP:  Clear to auscultation b/l, no wheezes, rales or rhonchi.  ABD: Normal bowel sounds; soft; non-distended; non-tender; no guarding/ rebound.  EXT: Normal ROM. No clubbing, cyanosis or edema. 2+ pulses to b/l ue/le.  NEURO: Alert, oriented, grossly unremarkable  PSYCH: Cooperative, mood and affect appropriate.

## 2020-03-14 NOTE — ED PROVIDER NOTE - PATIENT PORTAL LINK FT
You can access the FollowMyHealth Patient Portal offered by [facility] by registering at the following website: [email or web address]. By joining [employer]’s FollowMyHealth portal, you will also be able to view your health information using other applications (apps) compatible with our system.

## 2020-03-14 NOTE — ED PROVIDER NOTE - CLINICAL SUMMARY MEDICAL DECISION MAKING FREE TEXT BOX
55 y/o M no PMHx p/w intermittent R shoulder pain x Months. Will provide P.T. and Ortho referrals and d/c home. 53 y/o M no PMHx p/w intermittent R shoulder pain x Months. Given ongoing nature of pain and normal PE, no indication for imaging. Will provide P.T. and Ortho referrals and d/c home.  RX for synthroid sent to local pharmacy.    I have discussed the discharge plan with the patient. The patient agrees with the plan, as discussed.  The patient understands Emergency Department diagnosis is a preliminary diagnosis often based on limited information and that the patient must adhere to the follow-up plan as discussed.  The patient understands that if the symptoms worsen or if prescribed medications do not have the desired/planned effect that the patient must/may return to the closest Emergency Department at any time for further evaluation and treatment.

## 2020-03-14 NOTE — ED PROVIDER NOTE - CARE PROVIDER_API CALL
Eduardo Abdul)  Orthopaedic Surgery; Sports Medicine  [address] [place] [address]  Phone: [phone]  Fax: [phone]  Follow Up Time:
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
## 2020-03-14 NOTE — ED PROVIDER NOTE - EXTREMITY EXAM
right upper extremity findings/No evidence of erythema, edema or ecchymosis. No bruising, gross deformity, joint swelling, limited ROM, reduced strength, swelling or point tenderness noted.

## 2020-03-14 NOTE — ED PROVIDER NOTE - OBJECTIVE STATEMENT
53 y/o M no PMHx presents to the ED with c/o intermittent R shoulder pain x Months. Pt states he had a R shoulder injury resulting in a subluxation which was never treated. He is currently taking Advil and Naproxen w/ moderate relief. Denies Hx of shoulder surgery. Pt is requesting a P.T. and ortho referral. 53 y/o M no PMHx presents to the ED with c/o intermittent R shoulder pain x "months."   Pt states he had a R shoulder injury resulting in a subluxation which was never treated. He is currently taking Advil and Naproxen w/ moderate relief. Denies Hx of shoulder surgery. Pt is requesting a P.T. and ortho referral.    Also requesting synthroid refill.

## 2020-03-14 NOTE — ED ADULT NURSE NOTE - NSIMPLEMENTINTERV_GEN_ALL_ED
Implemented All Universal Safety Interventions:  [place] to call system. Call bell, personal items and telephone within reach. Instruct patient to call for assistance. Room bathroom lighting operational. Non-slip footwear when patient is off stretcher. Physically safe environment: no spills, clutter or unnecessary equipment. Stretcher in lowest position, wheels locked, appropriate side rails in place.

## 2020-03-14 NOTE — ED PROVIDER NOTE - CHIEF COMPLAINT
The patient is a 54y Male complaining of shoulder pain/injury. The patient is a 54y Male complaining of right shoulder pain.

## 2020-03-18 ENCOUNTER — EMERGENCY (EMERGENCY)
Facility: HOSPITAL | Age: 55
LOS: 1 days | Discharge: HOME | End: 2020-03-18
Admitting: EMERGENCY MEDICINE
Payer: MEDICAID

## 2020-03-18 VITALS
OXYGEN SATURATION: 98 % | WEIGHT: 178.35 LBS | DIASTOLIC BLOOD PRESSURE: 78 MMHG | RESPIRATION RATE: 17 BRPM | HEART RATE: 81 BPM | TEMPERATURE: 98 F | SYSTOLIC BLOOD PRESSURE: 154 MMHG

## 2020-03-18 DIAGNOSIS — Z88.0 ALLERGY STATUS TO PENICILLIN: ICD-10-CM

## 2020-03-18 DIAGNOSIS — M54.5 LOW BACK PAIN: ICD-10-CM

## 2020-03-18 DIAGNOSIS — M25.511 PAIN IN RIGHT SHOULDER: ICD-10-CM

## 2020-03-18 DIAGNOSIS — Z88.1 ALLERGY STATUS TO OTHER ANTIBIOTIC AGENTS STATUS: ICD-10-CM

## 2020-03-18 DIAGNOSIS — E03.9 HYPOTHYROIDISM, UNSPECIFIED: ICD-10-CM

## 2020-03-18 DIAGNOSIS — S22.39XA FRACTURE OF ONE RIB, UNSPECIFIED SIDE, INITIAL ENCOUNTER FOR CLOSED FRACTURE: Chronic | ICD-10-CM

## 2020-03-18 DIAGNOSIS — Z79.899 OTHER LONG TERM (CURRENT) DRUG THERAPY: ICD-10-CM

## 2020-03-18 DIAGNOSIS — G89.29 OTHER CHRONIC PAIN: ICD-10-CM

## 2020-03-18 PROCEDURE — 99284 EMERGENCY DEPT VISIT MOD MDM: CPT

## 2020-03-18 PROCEDURE — 99283 EMERGENCY DEPT VISIT LOW MDM: CPT

## 2020-03-18 RX ORDER — CYCLOBENZAPRINE HYDROCHLORIDE 10 MG/1
1 TABLET, FILM COATED ORAL
Qty: 10 | Refills: 0
Start: 2020-03-18 | End: 2020-03-22

## 2020-03-18 RX ADMIN — Medication 500 MILLIGRAM(S): at 09:11

## 2020-03-18 NOTE — ED PROVIDER NOTE - CHPI ED SYMPTOMS NEG
no motor function loss/no fatigue/no neck tenderness/no numbness/no bladder dysfunction/no difficulty bearing weight

## 2020-03-18 NOTE — ED ADULT TRIAGE NOTE - CHIEF COMPLAINT QUOTE
Pt states "My left hip and right buttocks is hurting.  I almost fell in a hole at my job 15 years ago and this has been going on since then.  I just want pain meds."  Denies new injuries, known exposure to + covid patients, flu-like symptoms. Masks applied in triage.
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
## 2020-03-18 NOTE — ED PROVIDER NOTE - MUSCULOSKELETAL, MLM
Spine appears normal, range of motion is not limited, no muscle or joint tenderness, no saddle anesthesias, full ROM, no distal paresthesias

## 2020-03-18 NOTE — ED ADULT NURSE NOTE - OBJECTIVE STATEMENT
Patient c/o L and R hip pain---patient ambulating independently with steady gait. Reports falling at work x 1 week ago. Speaking in full, complete sentences. +CHA. No obvious deformity or trauma noted.

## 2020-03-18 NOTE — ED PROVIDER NOTE - PATIENT PORTAL LINK FT
You can access the FollowMyHealth Patient Portal offered by [facility] by registering at the following website: [email or web address]. By joining [employer]’s FollowMyHealth portal, you will also be able to view your health information using other applications (apps) compatible with our system.

## 2020-03-18 NOTE — ED PROVIDER NOTE - NSFOLLOWUPINSTRUCTIONS_ED_ALL_ED_FT
EnglishSpanish    Chronic Back Pain  When back pain lasts longer than 3 months, it is called chronic back pain. Pain may get worse at certain times (flare-ups). There are things you can do at home to manage your pain.  Follow these instructions at home:  Activity               Avoid bending and other activities that make pain worse.When standing:  Keep your upper back and neck straight.Keep your shoulders pulled back.Avoid slouching.When sitting:  Keep your back straight.Relax your shoulders. Do not round your shoulders or pull them backward.Do not sit or  one place for long periods of time.Take short rest breaks during the day. Lying down or standing is usually better than sitting. Resting can help relieve pain.When sitting or lying down for a long time, do some mild activity or stretching. This will help to prevent stiffness and pain.Get regular exercise. Ask your doctor what activities are safe for you.Do not lift anything that is heavier than 10 lb (4.5 kg). To prevent injury when you lift things:  Bend your knees.Keep the weight close to your body.Avoid twisting.Managing pain     If told, put ice on the painful area. Your doctor may tell you to use ice for 24–48 hours after a flare-up starts.  Put ice in a plastic bag.Place a towel between your skin and the bag.Leave the ice on for 20 minutes, 2–3 times a day.If told, put heat on the painful area as often as told by your doctor. Use the heat source that your doctor recommends, such as a moist heat pack or a heating pad.  Place a towel between your skin and the heat source.Leave the heat on for 20–30 minutes.Remove the heat if your skin turns bright red. This is especially important if you are unable to feel pain, heat, or cold. You may have a greater risk of getting burned.Soak in a warm bath. This can help relieve pain.Take over-the-counter and prescription medicines only as told by your doctor.General instructions     Sleep on a firm mattress. Try lying on your side with your knees slightly bent. If you lie on your back, put a pillow under your knees.Keep all follow-up visits as told by your doctor. This is important.Contact a doctor if:  You have pain that does not get better with rest or medicine.Get help right away if:  One or both of your arms or legs feel weak.One or both of your arms or legs lose feeling (numbness).You have trouble controlling when you poop (bowel movement) or pee (urinate).You feel sick to your stomach (nauseous).You throw up (vomit).You have belly (abdominal) pain.You have shortness of breath.You pass out (faint).Summary  When back pain lasts longer than 3 months, it is called chronic back pain. Pain may get worse at certain times (flare-ups).Use ice and heat as told by your doctor. Your doctor may tell you to use ice after flare-ups.This information is not intended to replace advice given to you by your health care provider. Make sure you discuss any questions you have with your health care provider.    Document Released: 06/05/2009 Document Revised: 08/02/2018 Document Reviewed: 08/02/2018  [employer] Patient Education © 2020 [employer]

## 2020-03-18 NOTE — ED ADULT NURSE NOTE - CHPI ED NUR SYMPTOMS NEG
no numbness/no deformity/no weakness/no stiffness/no bruising/no back pain/no tingling/no fever/no abrasion/no difficulty bearing weight

## 2020-04-03 ENCOUNTER — EMERGENCY (EMERGENCY)
Facility: HOSPITAL | Age: 55
LOS: 1 days | Discharge: HOME | End: 2020-04-03
Admitting: EMERGENCY MEDICINE
Payer: MEDICAID

## 2020-04-03 VITALS
TEMPERATURE: 98 F | OXYGEN SATURATION: 98 % | RESPIRATION RATE: 18 BRPM | HEIGHT: 68 IN | HEART RATE: 90 BPM | DIASTOLIC BLOOD PRESSURE: 107 MMHG | WEIGHT: 175.05 LBS | SYSTOLIC BLOOD PRESSURE: 165 MMHG

## 2020-04-03 VITALS
DIASTOLIC BLOOD PRESSURE: 86 MMHG | HEART RATE: 76 BPM | RESPIRATION RATE: 18 BRPM | OXYGEN SATURATION: 97 % | SYSTOLIC BLOOD PRESSURE: 157 MMHG

## 2020-04-03 DIAGNOSIS — S22.39XA FRACTURE OF ONE RIB, UNSPECIFIED SIDE, INITIAL ENCOUNTER FOR CLOSED FRACTURE: Chronic | ICD-10-CM

## 2020-04-03 PROCEDURE — 99283 EMERGENCY DEPT VISIT LOW MDM: CPT

## 2020-04-03 PROCEDURE — 99281 EMR DPT VST MAYX REQ PHY/QHP: CPT

## 2020-04-03 RX ORDER — LEVOTHYROXINE SODIUM 125 MCG
1 TABLET ORAL
Qty: 30 | Refills: 0
Start: 2020-04-03 | End: 2020-05-02

## 2020-04-03 RX ORDER — ACETAMINOPHEN 500 MG
650 TABLET ORAL ONCE
Refills: 0 | Status: COMPLETED | OUTPATIENT
Start: 2020-04-03 | End: 2020-04-03

## 2020-04-03 RX ORDER — LEVOTHYROXINE SODIUM 125 MCG
200 TABLET ORAL DAILY
Refills: 0 | Status: DISCONTINUED | OUTPATIENT
Start: 2020-04-03 | End: 2020-04-07

## 2020-04-03 RX ADMIN — Medication 200 MICROGRAM(S): at 20:31

## 2020-04-03 RX ADMIN — Medication 650 MILLIGRAM(S): at 20:30

## 2020-04-03 NOTE — ED PROVIDER NOTE - OBJECTIVE STATEMENT
55y/o m with h/o hypothyroidism here for med refill. Reports of no complaints at this time. Admit of no PMD and gets his meds from ED to ED or clinics.

## 2020-04-03 NOTE — ED PROVIDER NOTE - NSFOLLOWUPCLINICS_GEN_ALL_ED_FT
[facility]ANDU [address] [place] [address]  Family Medicine  [address] [place] [address]  Phone: [phone]  Fax: [phone]  Follow Up Time:

## 2020-04-03 NOTE — ED ADULT NURSE NOTE - OBJECTIVE STATEMENT
54yr/male presents to ED requesting refill of synthroid medication. Patient also reports pain to left hand, states "I punched a wall." No deformity noted to left hand. Patient denies any cough, fever, or shortness of breath. Respirations equal and unlabored. Speaking in full sentences. Ambulating with steady gait. 54yr/male presents to ED requesting refill of synthroid medication. Patient also reports pain to left hand, states "I punched a wall."  Patient states hand injury occurred "a while ago." No deformity noted to left hand. Patient denies any cough, fever, or shortness of breath. Respirations equal and unlabored. Speaking in full sentences. Ambulating with steady gait.

## 2020-04-03 NOTE — ED ADULT NURSE NOTE - NSIMPLEMENTINTERV_GEN_ALL_ED
Implemented All Universal Safety Interventions:  [place] to call system. Call bell, personal items and telephone within reach. Instruct patient to call for assistance. Room bathroom lighting operational. Non-slip footwear when patient is off stretcher. Physically safe environment: no spills, clutter or unnecessary equipment. Stretcher in lowest position, wheels locked, appropriate side rails in place.

## 2020-04-03 NOTE — ED PROVIDER NOTE - PATIENT PORTAL LINK FT
You can access the FollowMyHealth Patient Portal offered by [facility] by registering at the following website: [email or web address]. By joining [employer]’s FollowMyHealth portal, you will also be able to view your health information using other applications (apps) compatible with our system.

## 2020-04-07 DIAGNOSIS — E03.9 HYPOTHYROIDISM, UNSPECIFIED: ICD-10-CM

## 2020-04-07 DIAGNOSIS — Z79.1 LONG TERM (CURRENT) USE OF NON-STEROIDAL ANTI-INFLAMMATORIES (NSAID): ICD-10-CM

## 2020-04-07 DIAGNOSIS — Z76.0 ENCOUNTER FOR ISSUE OF REPEAT PRESCRIPTION: ICD-10-CM

## 2020-04-07 DIAGNOSIS — Z88.1 ALLERGY STATUS TO OTHER ANTIBIOTIC AGENTS STATUS: ICD-10-CM

## 2020-04-07 DIAGNOSIS — Z79.899 OTHER LONG TERM (CURRENT) DRUG THERAPY: ICD-10-CM

## 2020-04-29 NOTE — PATIENT PROFILE ADULT. - MEDICATION ADMINISTRATION INFO, PROFILE
no concerns Vital Signs Last 24 Hrs  T(C): 38 (29 Apr 2020 15:43), Max: 38 (29 Apr 2020 15:43)  T(F): 100.4 (29 Apr 2020 15:43), Max: 100.4 (29 Apr 2020 15:43)  HR: 106 (29 Apr 2020 15:43) (106 - 114)  BP: 131/82 (29 Apr 2020 10:01) (131/82 - 131/82)  RR: 20 (29 Apr 2020 15:43) (20 - 20)  SpO2: 95% (29 Apr 2020 15:43) (95% - 98%)    General: NAD, resting comfortably in bed  Head: normocephalic, atraumatic  Eyes: PERRLA 3mm b/l, EOMI  Pulm: unlabored breathing, CTA bilaterally, no crackles or wheezes  Cardio: S1S2+, RRR, no murmurs  GI: soft, BS+ normoactive, mildly distended, non-tender  Vascular: no peripheral edema, DP pulses 2+ b/l. cap refill <3 secs  Neuro: AAOx3, no gross focal deficits  Skin: b/l lower ext erythema on anterior shins, mild swelling, warm to touch. no fluctuant or indurated areas. no open wounds. no secretions or drainage. small circular scab noted on left anterior shin

## 2020-06-28 NOTE — ED ADULT NURSE NOTE - NSIMPLEMENTINTERV_GEN_ALL_ED
(4) excellent
Implemented All Universal Safety Interventions:  [place] to call system. Call bell, personal items and telephone within reach. Instruct patient to call for assistance. Room bathroom lighting operational. Non-slip footwear when patient is off stretcher. Physically safe environment: no spills, clutter or unnecessary equipment. Stretcher in lowest position, wheels locked, appropriate side rails in place.

## 2020-09-02 NOTE — ED ADULT TRIAGE NOTE - NS ED NURSE DIRECT TO ROOM YN
How Severe Are Your Spot(S)?: mild What Is The Reason For Today's Visit?: Full Body Skin Examination What Is The Reason For Today's Visit? (Being Monitored For X): the development of a new lesion No

## 2020-10-19 ENCOUNTER — EMERGENCY (EMERGENCY)
Facility: HOSPITAL | Age: 55
LOS: 1 days | Discharge: HOME | End: 2020-10-19
Attending: EMERGENCY MEDICINE | Admitting: EMERGENCY MEDICINE
Payer: MEDICAID

## 2020-10-19 VITALS
TEMPERATURE: 98 F | OXYGEN SATURATION: 98 % | RESPIRATION RATE: 16 BRPM | DIASTOLIC BLOOD PRESSURE: 97 MMHG | HEART RATE: 73 BPM | SYSTOLIC BLOOD PRESSURE: 147 MMHG

## 2020-10-19 VITALS
HEIGHT: 68 IN | DIASTOLIC BLOOD PRESSURE: 114 MMHG | OXYGEN SATURATION: 96 % | RESPIRATION RATE: 18 BRPM | SYSTOLIC BLOOD PRESSURE: 179 MMHG | HEART RATE: 86 BPM | TEMPERATURE: 98 F

## 2020-10-19 DIAGNOSIS — E03.9 HYPOTHYROIDISM, UNSPECIFIED: ICD-10-CM

## 2020-10-19 DIAGNOSIS — Z79.899 OTHER LONG TERM (CURRENT) DRUG THERAPY: ICD-10-CM

## 2020-10-19 DIAGNOSIS — Z76.0 ENCOUNTER FOR ISSUE OF REPEAT PRESCRIPTION: ICD-10-CM

## 2020-10-19 DIAGNOSIS — S22.39XA FRACTURE OF ONE RIB, UNSPECIFIED SIDE, INITIAL ENCOUNTER FOR CLOSED FRACTURE: Chronic | ICD-10-CM

## 2020-10-19 DIAGNOSIS — Z88.1 ALLERGY STATUS TO OTHER ANTIBIOTIC AGENTS STATUS: ICD-10-CM

## 2020-10-19 DIAGNOSIS — Z79.1 LONG TERM (CURRENT) USE OF NON-STEROIDAL ANTI-INFLAMMATORIES (NSAID): ICD-10-CM

## 2020-10-19 PROCEDURE — 86780 TREPONEMA PALLIDUM: CPT

## 2020-10-19 PROCEDURE — 36415 COLL VENOUS BLD VENIPUNCTURE: CPT

## 2020-10-19 PROCEDURE — 86592 SYPHILIS TEST NON-TREP QUAL: CPT

## 2020-10-19 PROCEDURE — 99283 EMERGENCY DEPT VISIT LOW MDM: CPT

## 2020-10-19 RX ORDER — LEVOTHYROXINE SODIUM 125 MCG
200 TABLET ORAL ONCE
Refills: 0 | Status: COMPLETED | OUTPATIENT
Start: 2020-10-19 | End: 2020-10-19

## 2020-10-19 RX ORDER — LEVOTHYROXINE SODIUM 125 MCG
1 TABLET ORAL
Qty: 30 | Refills: 0
Start: 2020-10-19 | End: 2020-11-17

## 2020-10-19 RX ADMIN — Medication 200 MICROGRAM(S): at 00:36

## 2020-10-19 NOTE — ED PROVIDER NOTE - CLINICAL SUMMARY MEDICAL DECISION MAKING FREE TEXT BOX
med refil/ dose of synthroid given. will refer to pmd clinic med refil/ dose of synthroid given. will refer to pmd clinic  after dc, pt requested to be seen again. says he has a sore on penis. noted a few days ago. not painful. no discharge/dysuria. on exam 2mm superficial ulceration left lateral glans. rpr sent. denies masturbation but says he is sexually active.

## 2020-10-19 NOTE — ED ADULT NURSE NOTE - OBJECTIVE STATEMENT
Pt presents to [facility] for prescription refill. Pt states " I need a refill of my synthroid medication. Denies any other symptoms, no complaints at this time.

## 2020-10-19 NOTE — ED PROVIDER NOTE - OBJECTIVE STATEMENT
requesting refill/dose of his synthroid 200mcg. Ran out 2 days ago. Says his clinic doctor stopped seeing him/ giving refills. Asymptomatic

## 2020-10-19 NOTE — ED PROVIDER NOTE - NSFOLLOWUPINSTRUCTIONS_ED_ALL_ED_FT
Please see primary care referral.  Call for appointment.  If you have any problems with followup, please call the ED Referral Coordinator at [phone].  Return to the ER if symptoms worsen or other concerns.      Medicine Refill    WHAT YOU NEED TO KNOW:    You may have been given a prescription in the emergency department for a few days of your medicine. It is important to refill your medicine before you completely run out. You need to follow up with your healthcare provider for a full prescription within the next few days. You will not be given additional refills in the emergency department.     DISCHARGE INSTRUCTIONS:    Follow up with your healthcare provider: Contact your healthcare provider before you are completely out of medicine. Write down your questions so you remember to ask them during your visits.     Refill tips: Your medicine will treat your condition if you take the medicine regularly. Prevent missed doses by doing the following:   •Keep a chart of your medicine. Include all of your current medicines. Write down the name and strength of each medicine, the prescription number, and the number of refills. Also write down the dates of your refills. Ask your pharmacy or insurance provider for other ways to help you keep track of your medicines.      •Refill medicines a few days before you run out. This will decrease any problems that will prevent you from getting your medicines on time. Problems include a closed pharmacy, or the pharmacy may have to contact your healthcare provider.      •If you know you are going to be traveling, refill your medicines before you leave. You may not be able to get refills if you do not use your local pharmacy. You may need to call your insurance provider to make them aware of your travels.

## 2020-10-19 NOTE — ED PROVIDER NOTE - NSFOLLOWUPCLINICS_GEN_ALL_ED_FT
[facility] [address] [place] [address]  Family Medicine  [address] [place] [address]  Phone: [phone]  Fax: [phone]  Follow Up Time:

## 2020-10-19 NOTE — ED ADULT TRIAGE NOTE - OTHER COMPLAINTS
pt reports he ran out of synthroid 200mcg 2 days ago, BP elevated in triage, pt reports he no longer takes his BP meds, denies any headache, changes in vision or any other sx

## 2020-10-19 NOTE — ED PROVIDER NOTE - PATIENT PORTAL LINK FT
You can access the FollowMyHealth Patient Portal offered by [facility] by registering at the following website: [email or web address]. By joining [email or web address]’s FollowMyHealth portal, you will also be able to view your health information using other applications (apps) compatible with our system.

## 2020-10-19 NOTE — ED ADULT NURSE NOTE - PAIN RATING/NUMBER SCALE (0-10): ACTIVITY
Pt states she lives with her  and children in a apt with 3 steps to enter and another 5 steps. Pt states she was independent with all ADLs and ambulation. 0

## 2020-10-21 LAB — T PALLIDUM AB TITR SER: POSITIVE

## 2020-12-14 NOTE — ED ADULT TRIAGE NOTE - AS O2 DELIVERY
ICC VISIT NOTE       Subjective   Patient Disposition:   Adrian is a 22 year old male and is being seen in our clinic for   Chief Complaint   Patient presents with   • Cough     here for dry cough   • fatigue     c/o fatigue, has a slight headache and scratchy throat         22-year-old male with dry cough and nasal congestion without fever, chest pain, shortness of breath, GI symptoms or wheezing over the past 2 days.        MEDICATIONS:  Current Outpatient Medications   Medication Sig   • Pseudoephedrine-DM-GG (ROBITUSSIN CF PO)    • pantoprazole (PROTONIX) 40 MG tablet Take 1 tablet by mouth daily.   • albuterol 108 (90 Base) MCG/ACT inhaler Inhale 2 puffs into the lungs every 4 hours as needed for Shortness of Breath or Wheezing.     No current facility-administered medications for this visit.        ALLERGIES:  ALLERGIES:  No Known Allergies    PAST MEDICAL HISTORY:  Past Medical History:   Diagnosis Date   • Anxiety    • Pneumonia        PAST SURGICAL HISTORY:  Past Surgical History:   Procedure Laterality Date   • Tonsillectomy         FAMILY HISTORY:  No family history on file.   No pertinent history    SOCIAL HISTORY:  Social History     Tobacco Use   • Smoking status: Never Smoker   • Smokeless tobacco: Never Used   Substance Use Topics   • Alcohol use: Not Currently     Frequency: Never   • Drug use: Yes     Types: Marijuana, Other     Comment: xanax 2mg daily/ marijuana 2x week         Patient's medications, allergies, past medical, surgical, and social history  were reviewed and updated as appropriate.    REVIEW OF SYSTEMS  Review of Systems   Constitutional: Negative for chills and fever.   HENT: Positive for congestion and rhinorrhea.    Respiratory: Positive for cough. Negative for shortness of breath.    Cardiovascular: Negative.    Gastrointestinal: Negative.    Musculoskeletal: Negative.    Neurological: Negative.    All other systems reviewed and are negative.      Vitals:    12/14/20 1349   BP:  115/71   Pulse: 74   Resp: 18   Temp: 97.2 °F (36.2 °C)   SpO2: 97%   Weight: 124.7 kg (275 lb)   PainSc: 9-10   PainLoc: Throat        POINT OF CARE TEST RESULTS  No results found for this visit on 12/14/20.      Objective     Physical Exam  Vitals signs and nursing note reviewed.   Constitutional:       General: He is not in acute distress.     Appearance: Normal appearance. He is not toxic-appearing.   HENT:      Nose: Rhinorrhea present.      Mouth/Throat:      Mouth: Mucous membranes are moist.      Pharynx: No posterior oropharyngeal erythema.   Eyes:      Extraocular Movements: Extraocular movements intact.      Pupils: Pupils are equal, round, and reactive to light.   Neck:      Musculoskeletal: Normal range of motion. No neck rigidity.   Cardiovascular:      Rate and Rhythm: Normal rate and regular rhythm.      Pulses: Normal pulses.      Heart sounds: Normal heart sounds.   Pulmonary:      Effort: Pulmonary effort is normal.      Breath sounds: Normal breath sounds.   Abdominal:      General: Bowel sounds are normal. There is no distension.      Tenderness: There is no abdominal tenderness. There is no guarding.   Musculoskeletal: Normal range of motion.      Right lower leg: No edema.   Skin:     General: Skin is warm.      Capillary Refill: Capillary refill takes less than 2 seconds.   Neurological:      General: No focal deficit present.      Mental Status: He is alert and oriented to person, place, and time.   Psychiatric:         Mood and Affect: Mood normal.         Behavior: Behavior normal.           Assessment   Problem List Items Addressed This Visit     None      Visit Diagnoses     Suspected COVID-19 virus infection    -  Primary    Relevant Orders    2019 NOVEL CORONAVIRUS (SARS-COV-2)          FOLLOW UP  Please follow up with your PCP Prisca Mcfadden MD or proceed to ER if symptoms persist or worsen.      Instructions provided as documented in the AVS.     room air

## 2020-12-22 NOTE — H&P ADULT - PROBLEM SELECTOR PROBLEM 6
Please call the [facility] at [phone], select OPTION #2,  if any of your medications change.  This means: if a med is discontinued, a new med is started, or a dose is changed.  These meds may interact with your warfarin and we may need to adjust your dose.  This is especially important if you start any type of ANTIBIOTIC.    Also, please call if you have any admissions to the hospital, visits to an emergency room, procedures planned, or if any other medical provider has instructed you to hold your warfarin.     Acidosis Elevated hemoglobin R/O Abdominal mass

## 2021-01-07 NOTE — ED PROVIDER NOTE - PRINCIPAL DIAGNOSIS
Finger pain, right Bilobed Flap Text: The defect edges were debeveled with a #15 scalpel blade.  Given the location of the defect and the proximity to free margins a bilobe flap was deemed most appropriate.  Using a sterile surgical marker, an appropriate bilobe flap drawn around the defect.    The area thus outlined was incised deep to adipose tissue with a #15 scalpel blade.  The skin margins were undermined to an appropriate distance in all directions utilizing iris scissors.

## 2021-01-08 NOTE — ED PROVIDER NOTE - PMH
[FreeTextEntry1] : Postop hemorrhoid and fissure surgery. He is doing very well. Very happy with the results of his surgery.Recommend high fiber diet, Metamucil daily, sitz baths, stool softeners, pain medications p.r.n. Abscess    Hypothyroid    Polysubstance abuse

## 2021-02-10 ENCOUNTER — EMERGENCY (EMERGENCY)
Facility: HOSPITAL | Age: 56
LOS: 1 days | Discharge: HOME | End: 2021-02-10
Attending: EMERGENCY MEDICINE | Admitting: EMERGENCY MEDICINE
Payer: MEDICAID

## 2021-02-10 VITALS
OXYGEN SATURATION: 96 % | WEIGHT: 199.96 LBS | SYSTOLIC BLOOD PRESSURE: 151 MMHG | HEART RATE: 79 BPM | HEIGHT: 68 IN | RESPIRATION RATE: 16 BRPM | TEMPERATURE: 98 F | DIASTOLIC BLOOD PRESSURE: 101 MMHG

## 2021-02-10 DIAGNOSIS — Z76.0 ENCOUNTER FOR ISSUE OF REPEAT PRESCRIPTION: ICD-10-CM

## 2021-02-10 DIAGNOSIS — S22.39XA FRACTURE OF ONE RIB, UNSPECIFIED SIDE, INITIAL ENCOUNTER FOR CLOSED FRACTURE: Chronic | ICD-10-CM

## 2021-02-10 PROCEDURE — 99283 EMERGENCY DEPT VISIT LOW MDM: CPT

## 2021-02-10 RX ORDER — LEVOTHYROXINE SODIUM 125 MCG
1 TABLET ORAL
Qty: 30 | Refills: 0
Start: 2021-02-10 | End: 2021-03-11

## 2021-02-10 NOTE — ED ADULT NURSE NOTE - NSIMPLEMENTINTERV_GEN_ALL_ED
Implemented All Universal Safety Interventions:  [place] to call system. Call bell, personal items and telephone within reach. Instruct patient to call for assistance. Room bathroom lighting operational. Non-slip footwear when patient is off stretcher. Physically safe environment: no spills, clutter or unnecessary equipment. Stretcher in lowest position, wheels locked, appropriate side rails in place.

## 2021-02-10 NOTE — ED ADULT NURSE NOTE - OBJECTIVE STATEMENT
received A&OX3 55years old male pt with c/o of " I need synthroid refill." pt denies any pain or medical complaints.
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
## 2021-02-10 NOTE — ED PROVIDER NOTE - PATIENT PORTAL LINK FT
You can access the FollowMyHealth Patient Portal offered by [facility] by registering at the following website: [email or web address]. By joining [employer]’s FollowMyHealth portal, you will also be able to view your health information using other applications (apps) compatible with our system.

## 2021-02-26 NOTE — ED ADULT TRIAGE NOTE - NS ED NURSE AMBULANCES
----- Message from CANDICE Ferrell sent at 2/24/2021  6:57 PM PST -----  Regarding: Eye Exam   HI Davida~    Can you pls request Michelle's exam with Dr. Manuel. She said it was completed 2 years ago.    Thank you!    TERRI     [employer]

## 2021-03-07 NOTE — ED PROVIDER NOTE - PROVIDER TOKENS
Subjective   Patient ID: JOE is a 80 year old male.    Chief Complaint   Patient presents with   • Follow-up     medication management       HPI  80-year-old man with history of BPH, mild to moderate coronary artery disease, chronic diastolic CHF, stage III chronic kidney disease, atherothrombotic CVA, MGUS, moderate persistent asthma and obstructive sleep apnea-untreated presents for a annual 360 exam.  The patient states that he easily gets tired, works on the computer on and off a lot, and will doze off there, but not very often throughout the day.  Was diagnosed with sleep apnea in the past.  Was never able to tolerate any of the masks.  Has not pursued any other treatment for this.  He states that when he is walking, he is needing to use a cane, there is right hip pain as well as left knee pain and weakness, uses a cane mostly on the left side.  Was evaluated by cardiology, last echocardiogram was 2/20, and LVEF is 51%.  His metoprolol and torsemide were increased since the fall of last year, and that has helped his endurance.  Also sees nephrology  Review of Systems        Constitutional:          General Pt denies fever, chills, fatigue, and weight change  Ophthalmologic:          no Change in vision.  no Drainage from eyes.  no Eye irritation  HEENT/Neck:          no Loss of hearing.  no Post-nasal drip, or rhinitis.  no Sore throat  Respiratory:          Patient denies persistent cough, SOB, wheezing or dyspnea on exertion  Cardiovascular:          Patient denies CP, palpitations, syncope or orthopnea  Gastroenterology:          no Abdominal pain.  no Change in bowel habits.  no Nausea.  no Vomiting  Genitourinary:          no Blood in urine.  no Dysuria.  no Urinary frequency  Musculoskeletal:          Patient denies back pain, myalgias, joint pain or swelling  Dermatology:          no Hair loss.  no Rash, or pruritis  Neurologic:          no Headache.  no Seizures.  no Tremor, or change in  gait.      Current Problem List:  Patient Active Problem List   Diagnosis   • GUSTABO (obstructive sleep apnea)   • Moderate persistent asthma   • MGUS (monoclonal gammopathy of unknown significance)   • Hyperlipidemia   • HTN (hypertension)   • CVA (cerebral vascular accident) (CMS/[facility])   • Chronic kidney disease (CKD), stage III (moderate) (CMS/[facility])   • Chronic diastolic CHF (congestive heart failure) (CMS/[facility])   • CAD (coronary artery disease)   • BPH with obstruction/lower urinary tract symptoms   • Gout   • Dizziness   • Shortness of breath   • Melena   • Personal history of colonic polyps   • Preop cardiovascular exam   • PVC (premature ventricular contraction)       Past Medical History:   Diagnosis Date   • BPH with obstruction/lower urinary tract symptoms    • CAD (coronary artery disease)     mild to moderate, no intervention   • Chronic diastolic CHF (congestive heart failure) (CMS/[facility])    • Chronic kidney disease (CKD), stage III (moderate) (CMS/[facility])    • CVA (cerebral vascular accident) (CMS/[facility]) 2003    Dizziness - Tiny, old, paramedian mid right pontine lacunar infarct   • Gout    • HTN (hypertension)    • Hyperlipidemia    • MGUS (monoclonal gammopathy of unknown significance)    • Moderate persistent asthma    • GUSTABO (obstructive sleep apnea)     untreated   • PVC (premature ventricular contraction)        Past Surgical History:   Procedure Laterality Date   • Blepharoplasty upper lid 2.5 hrs     • Transurethral elec-surg prostatectom          Social History     Tobacco Use   • Smoking status: Former Smoker     Packs/day: 0.00     Quit date: 1970     Years since quittin.2   • Smokeless tobacco: Never Used   Substance Use Topics   • Alcohol use: Never     Frequency: Never   • Drug use: Never       Current Medications    ALLOPURINOL (ZYLOPRIM) 300 MG TABLET    Take 1 tablet by mouth daily.    CLOPIDOGREL (PLAVIX) 75 MG TABLET    Take 1 tablet by mouth daily.    FERROUS SULFATE (IRON) 28 MG TAB     Take 28 mg by mouth daily.    LOSARTAN (COZAAR) 100 MG TABLET    Take 100 mg by mouth daily.    LOSARTAN (COZAAR) 100 MG TABLET    TAKE 1 TABLET BY MOUTH ONCE DAILY IN THE EVENING    METOPROLOL SUCCINATE (TOPROL-XL) 50 MG 24 HR TABLET    Take 1 tablet by mouth daily.    MULTIPLE VITAMIN (MULTI-VITAMIN DAILY PO)    Take 1 tablet by mouth daily.     PRAVASTATIN (PRAVACHOL) 40 MG TABLET    Take 1 tablet by mouth daily.    SPIRONOLACTONE (ALDACTONE) 25 MG TABLET    Take 25 mg by mouth daily.    SPIRONOLACTONE (ALDACTONE) 25 MG TABLET    TAKE 1 TABLET BY MOUTH ONCE DAILY IN THE MORNING    SYMBICORT 80-4.5 MCG/ACT INHALER    INHALE 2 PUFFS INTO THE LUNGS TWICE DAILY    TERAZOSIN (HYTRIN) 5 MG CAPSULE    Take 5 mg by mouth nightly.    TORSEMIDE (DEMADEX) 10 MG TABLET    Take 1 tablet by mouth daily.    VITAMIN D, CHOLECALCIFEROL, 50 MCG (2,000 UNITS) CAPSULE    Take 1 tablet by mouth daily.        Screenings  Pain:       ADLs  ADL Needs Assist: No    Short of Breath or Fatigue with ADL's: Yes  Recent Decline in ADL's: Yes, decline in ambulation/transferring, collaborate with provider (T)    Mobility Assist Devices: Cane, Other (comment)(wheelchair for long distance)  Are you deaf or do you have serious difficulty  hearing? : No  Are you blind or do you have serious difficulty seeing, even when wearing glasses?: No  Sensory Support Devices: None(glasses at night)    iADLs       Depression PHQ2/9:  Little interest or pleasure in activity?: Not at all  Feeling down, depressed or hopeless?: Not at all  Initial depression screening score:: 0  PHQ2 Interpretation: No further screening needed         Depression assessment/plan: Depression screening is negative no further plan needed.     Cognitive/Functional Status:  Are you deaf or do you have serious difficulty  hearing? : No  Are you blind or do you have serious difficulty seeing, even when wearing glasses?: No  Are you blind or do you have serious difficulty seeing, even  when wearing glasses?: No  Because of a physical, mental, or emotional condition, do you have serious difficulty concentrating, remembering or making decisions? : No  Do you have serious difficulty walking or climbing stairs?: Yes  Do you have difficulty dressing or bathing?: No  Because of a physical, mental, or emotional condition, do you have difficulty doing errands alone?: No  Listen carefully and repeat the following:  Apple  Watch  Nicki: Immediate repeat back - Apple Watch Nicki  Patient able to fill in the clock face with with 45 minutes past 10 o'clock?: Yes, clock is correct  Able to repeat the 3 words given previously?: Apple, Watch, Nicki  Total Score: 3  Negative for cognitive impairment (no need to score CDT)    STEADI-Fall Risk  Assessment of Fall Risk (STEADI) for Patients equal/greater than 18 Years of Age: Yes  I have fallen in the past year: Yes  I Use or Have Been Advised to Use a Cane or Walker to Get Around Safely: Yes  Sometimes I Feel Unsteady When I am Walking: Yes  I Steady Myself by Holding Onto Furniture When Walking at Home: Yes  I am Worried About Falling: No  I Need to Push With My Hands to Stand Up from a Chair: Yes  I Have Some Trouble Stepping Up Onto a Curb: Yes  I Often Have to Rush to the Toilet: No  I Have Lost Some Feeling in My Feet: No  I Take Medicine That Sometimes Makes Me Feel Lightheaded or More Tired Than Usual: No  I Take Medicine to Help Me Sleep or Improve My Mood: No  I Often Feel Sad or Depressed: No  STEADI Fall Score: 12  STEADI Score Equal To/Greater Than 4: Yes    Hearing Impairment: Are you deaf or do you have serious difficulty  hearing? : No    Vision Impairment: Are you blind or do you have serious difficulty seeing, even when wearing glasses?: No    Vision/Hearing Screening:    Hearing Screening    125Hz 250Hz 500Hz 1000Hz 2000Hz 3000Hz 4000Hz 6000Hz 8000Hz   Right ear:            Left ear:            Comments: Grossly intact - negative whisper test      Visual Acuity Screening    Right eye Left eye Both eyes   Without correction:   20/20   With correction:           Speech Impairment: No    Urinary Incontinence:  Over the past 4 weeks how often have you experienced bladder control problems? Never    Objective   Vitals:    03/04/21 0834   BP: 104/72   BP Location: LUE - Left upper extremity   Patient Position: Sitting   Cuff Size: Large Adult   Pulse: 93   Temp: 97.2 °F (36.2 °C)   TempSrc: Temporal   SpO2: 97%   Weight: 94.7 kg (208 lb 10.7 oz)   Height: 5' 6\" (1.676 m)   PainSc:  0      Physical Exam       GENERAL APPEARANCE: Well appearing, awake, alert, no acute distress.    HEENT: Normal appearing conjunctiva, Nasal exam is within normal limits, There is no pharyngeal erythema, TMs visualized and are normal.    NECK/THYROID: Neck is supple without cervical lymphadenopathy, no thyroid masses.    CARDIOVASCULAR: regular rate and rhythm, normal S1S2, no murmurs.      RESPIRATORY: clear to auscultation, there is no wheezing.    ABDOMEN: soft, non-tender, non-distended, +Bowel sounds, No hepatomegaly.   BACK: There is no tenderness over the spinous processes, Full ROM in both rotation and sidebending          EXTREMITIES: No clubbing, No edema.    MUSCULOSKELETAL: No Knee effussions, No ligament laxity by exam  NEURO: Grossly intact      Assessment   Problem List Items Addressed This Visit     GUSTABO (obstructive sleep apnea)    Moderate persistent asthma    MGUS (monoclonal gammopathy of unknown significance)    Chronic kidney disease (CKD), stage III (moderate) (CMS/[facility])    Relevant Orders    FERRITIN (Completed)    IRON AND TOTAL IRON BINDING CAPACITY (Completed)    VITAMIN D -25 HYDROXY (Completed)    PHOSPHORUS (Completed)    MAGNESIUM (Completed)    PARATHYROID HORMONE INTACT WITHOUT CALCIUM (Completed)    PROTEIN/CREATININE RATIO, URINE (Completed)    Chronic diastolic CHF (congestive heart failure) (CMS/[facility])    Relevant Medications    losartan (COZAAR) 100 MG  tablet    spironolactone (ALDACTONE) 25 MG tablet      Other Visit Diagnoses     Routine general medical examination at a health care facility    -  Primary    Relevant Orders    COMPREHENSIVE METABOLIC PANEL (Completed)    CBC WITH DIFFERENTIAL (Completed)    THYROID STIMULATING HORMONE REFLEX (Completed)    LIPID PANEL WITHOUT REFLEX (Completed)    NT PROBNP (Completed)    FERRITIN (Completed)    IRON AND TOTAL IRON BINDING CAPACITY (Completed)    VITAMIN D -25 HYDROXY (Completed)    PHOSPHORUS (Completed)    MAGNESIUM (Completed)    PARATHYROID HORMONE INTACT WITHOUT CALCIUM (Completed)    PROTEIN/CREATININE RATIO, URINE (Completed)    CBC WITH AUTOMATED DIFFERENTIAL (PERFORMABLE ONLY) (Completed)    Dyspnea, unspecified type        Relevant Orders    NT PROBNP (Completed)    Iron deficiency         Relevant Orders    FERRITIN (Completed)        Plan  The patient needs reevaluation of his sleep apnea, not tolerant of other masks before but now he is having symptoms, nodding off in the daytime and low energy.  May be able to tolerate another type of mask.  Continue cane, and advised walker for prevention of falls in the home.  He seems well compensated from a cardiovascular standpoint, continue medication changes made by them.  Follow-up with both cardiology and nephrology.    360 Assessment Plan:   Advance Care Planning Addressed: Advance Directives. Advance Care Planning discussed with patient.   Handouts (given to patient): Written handout given.   Health Maintenance (discussed and/or reviewed):   · Patient Education  · Medication needs  · Social Concerns  · Patient does not require behavioral or case management referrals or other disease specific interventions  Patient Education (taught and/or recommended):  · Patient educated on disease process, etiology & prognosis  · Proper usage and side effects of medications reviewed & discussed  · Medical compliance with plan discussed and risks of non-compliance  reviewed  · Return to clinic as clinically indicated as discussed with patient who verbalized understanding of & agreement with the plan    Schedule follow up: in 6 months   PROVIDER:[TOKEN:[[id number]]]

## 2021-03-11 RX ORDER — LEVOTHYROXINE SODIUM 125 MCG
1 TABLET ORAL
Qty: 30 | Refills: 0
Start: 2021-03-11 | End: 2021-04-09

## 2021-05-04 NOTE — ED ADULT NURSE NOTE - HARM RISK FACTORS
Detail Level: Detailed Quality 431: Preventive Care And Screening: Unhealthy Alcohol Use - Screening: Patient screened for unhealthy alcohol use using a single question and scores less than 2 times per year Quality 226: Preventive Care And Screening: Tobacco Use: Screening And Cessation Intervention: Patient screened for tobacco use and is an ex/non-smoker no

## 2021-05-23 ENCOUNTER — EMERGENCY (EMERGENCY)
Facility: HOSPITAL | Age: 56
LOS: 1 days | Discharge: HOME | End: 2021-05-23
Admitting: EMERGENCY MEDICINE
Payer: MEDICAID

## 2021-05-23 VITALS
HEART RATE: 75 BPM | SYSTOLIC BLOOD PRESSURE: 154 MMHG | WEIGHT: 184.97 LBS | TEMPERATURE: 98 F | DIASTOLIC BLOOD PRESSURE: 110 MMHG | OXYGEN SATURATION: 98 % | HEIGHT: 68 IN | RESPIRATION RATE: 18 BRPM

## 2021-05-23 DIAGNOSIS — S22.39XA FRACTURE OF ONE RIB, UNSPECIFIED SIDE, INITIAL ENCOUNTER FOR CLOSED FRACTURE: Chronic | ICD-10-CM

## 2021-05-23 DIAGNOSIS — Z88.1 ALLERGY STATUS TO OTHER ANTIBIOTIC AGENTS STATUS: ICD-10-CM

## 2021-05-23 DIAGNOSIS — E03.9 HYPOTHYROIDISM, UNSPECIFIED: ICD-10-CM

## 2021-05-23 DIAGNOSIS — I10 ESSENTIAL (PRIMARY) HYPERTENSION: ICD-10-CM

## 2021-05-23 DIAGNOSIS — Z76.0 ENCOUNTER FOR ISSUE OF REPEAT PRESCRIPTION: ICD-10-CM

## 2021-05-23 PROCEDURE — 99283 EMERGENCY DEPT VISIT LOW MDM: CPT

## 2021-05-23 RX ORDER — LEVOTHYROXINE SODIUM 125 MCG
1 TABLET ORAL
Qty: 14 | Refills: 0
Start: 2021-05-23 | End: 2021-06-05

## 2021-05-23 RX ORDER — AMLODIPINE BESYLATE 2.5 MG/1
1 TABLET ORAL
Qty: 14 | Refills: 0
Start: 2021-05-23 | End: 2021-06-05

## 2021-05-23 RX ORDER — AMLODIPINE BESYLATE 2.5 MG/1
5 TABLET ORAL ONCE
Refills: 0 | Status: COMPLETED | OUTPATIENT
Start: 2021-05-23 | End: 2021-05-23

## 2021-05-23 RX ORDER — LEVOTHYROXINE SODIUM 125 MCG
200 TABLET ORAL DAILY
Refills: 0 | Status: DISCONTINUED | OUTPATIENT
Start: 2021-05-23 | End: 2021-05-26

## 2021-05-23 RX ADMIN — AMLODIPINE BESYLATE 5 MILLIGRAM(S): 2.5 TABLET ORAL at 11:02

## 2021-05-23 RX ADMIN — Medication 200 MICROGRAM(S): at 11:16

## 2021-05-23 NOTE — ED PROVIDER NOTE - RESPIRATORY, MLM
Breath sounds clear and equal bilaterally. none known none known none known none known none known yes none known none known none known

## 2021-05-23 NOTE — ED ADULT TRIAGE NOTE - CHIEF COMPLAINT QUOTE
Pt presents to ED for med refill for HTN meds and synthroid. Pt states he has not gone to PCP "in a very long time." Denies CP/SOB/HA/change in vision.

## 2021-05-23 NOTE — ED PROVIDER NOTE - NSFOLLOWUPCLINICS_GEN_ALL_ED_FT
[facility]  Family Medicine  [address]  [place], [place] [address]  Phone: [phone]  Fax:   Follow Up Time: 4-6 Days

## 2021-05-23 NOTE — ED PROVIDER NOTE - CLINICAL SUMMARY MEDICAL DECISION MAKING FREE TEXT BOX
medication refill. pt without sx's. bp elevated. medication given in ED and rx given. d/w pt will need to f/u with pmd for further refills. return precautions d/w pt.

## 2021-05-23 NOTE — ED PROVIDER NOTE - NSFOLLOWUPINSTRUCTIONS_ED_ALL_ED_FT
Follow up with primary care within one week.         Chronic Hypertension    WHAT YOU NEED TO KNOW:    Hypertension is high blood pressure. Your blood pressure is the force of your blood moving against the walls of your arteries. Hypertension causes your blood pressure to get so high that your heart has to work much harder than normal. This can damage your heart. Even if you have hypertension for years, lifestyle changes, medicines, or both can help bring your blood pressure to normal.    DISCHARGE INSTRUCTIONS:    Call your local emergency number (911 in the ) or have someone call if:   •You have chest pain.      •You have any of the following signs of a heart attack: ?Squeezing, pressure, or pain in your chest      ?You may also have any of the following: ?Discomfort or pain in your back, neck, jaw, stomach, or arm      ?Shortness of breath      ?Nausea or vomiting      ?Lightheadedness or a sudden cold sweat        •You become confused or have difficulty speaking.      •You suddenly feel lightheaded or have trouble breathing.      Return to the emergency department if:   •You have a severe headache or vision loss.      •You have weakness in an arm or leg.      Call your doctor if:   •You feel faint, dizzy, confused, or drowsy.      •You have been taking your blood pressure medicine but your pressure is higher than your provider says it should be.      •You have questions or concerns about your condition or care.      Medicines: You may need any of the following:  •Antihypertensives may be used to help lower your blood pressure. Several kinds of medicines are available. Your healthcare provider may change the medicine or medicines you currently take. This may be needed if your blood pressure is often high when you check it at home or you are having other problems with blood pressure control.      •Diuretics help decrease extra fluid that collects in your body. This will help lower your BP. You may urinate more often while you take this medicine.      •Cholesterol medicine helps lower your cholesterol level. A low cholesterol level helps prevent heart disease and makes it easier to control your blood pressure.      •Take your medicine as directed. Contact your healthcare provider if you think your medicine is not helping or if you have side effects. Tell him or her if you are allergic to any medicine. Keep a list of the medicines, vitamins, and herbs you take. Include the amounts, and when and why you take them. Bring the list or the pill bottles to follow-up visits. Carry your medicine list with you in case of an emergency.      Follow up with your doctor as directed: You will need to return to have your blood pressure checked and to have other lab tests done. Write down your questions so you remember to ask them during your visits.    Stages of hypertension:     Blood Pressure Readings     •Normal blood pressure is 119/79 or lower. Your healthcare provider may only check your blood pressure each year if it stays at a normal level.      •Elevated blood pressure is 120/79 to 129/79. This is sometimes called prehypertension. Your healthcare provider may suggest lifestyle changes to help lower your blood pressure to a normal level. He or she may then check it again in 3 to 6 months.      •Stage 1 hypertension is 130/80 to 139/89. Your provider may recommend lifestyle changes, medication, and checks every 3 to 6 months until your blood pressure is controlled.      •Stage 2 hypertension is 140/90 or higher. Your provider will recommend lifestyle changes and have you take 2 kinds of hypertension medicines. You will also need to have your blood pressure checked monthly until it is controlled.      Manage chronic hypertension:   •Check your blood pressure at home. Avoid smoking, caffeine, and exercise at least 30 minutes before checking your blood pressure. Sit and rest for 5 minutes before you take your blood pressure. Extend your arm and support it on a flat surface. Your arm should be at the same level as your heart. Follow the directions that came with your blood pressure monitor. Check your blood pressure 2 times, 1 minute apart, before you take your medicine in the morning. Also check your blood pressure before your evening meal. Keep a record of your readings and bring it to your follow-up visits. Ask your healthcare provider what your blood pressure should be.  How to take a Blood Pressure           •Manage any other health conditions you have. Health conditions such as diabetes can increase your risk for hypertension. Follow your healthcare provider's instructions and take all your medicines as directed. Talk to your healthcare provider about any new health conditions you have recently developed.      •Ask about all medicines. Certain medicines can increase your blood pressure. Examples include oral birth control pills, decongestants, herbal supplements, and NSAIDs, such as ibuprofen. Your healthcare provider can tell you which medicines are safe for you to take. This includes prescription and over-the-counter medicines.      Lifestyle changes you can make to lower your blood pressure: Your provider may want you to make more lifestyle changes if you are having trouble controlling your blood pressure. This may feel difficult over time, especially if you think you are making good changes but your pressure is still high. It might help to focus on one new change at a time. For example, try to add 1 more day of exercise, or exercise for an extra 10 minutes on 2 days. Small changes can make a big difference. Your healthcare provider can also refer you to specialists such as a dietitian who can help you make small changes. Your family members may be included in helping you learn to create lifestyle changes, such as the following:  •Limit sodium (salt) as directed. Too much sodium can affect your fluid balance. Check labels to find low-sodium or no-salt-added foods. Some low-sodium foods use potassium salts for flavor. Too much potassium can also cause health problems. Your healthcare provider will tell you how much sodium and potassium are safe for you to have in a day. He or she may recommend that you limit sodium to 2,300 mg a day.             •Follow the meal plan recommended by your healthcare provider. A dietitian or your provider can give you more information on low-sodium plans or the DASH (Dietary Approaches to Stop Hypertension) eating plan. The DASH plan is low in sodium, processed sugar, unhealthy fats, and total fat. It is high in potassium, calcium, and fiber. These can be found in vegetables, fruit, and whole-grain foods.             •Be physically active throughout the day. Physical activity, such as exercise, can help control your blood pressure and your weight. Be physically active for at least 30 minutes per day, on most days of the week. Include aerobic activity, such as walking or riding a bicycle. Also include strength training at least 2 times each week. Your healthcare providers can help you create a physical activity plan.   FAMILY WALKING FOR EXERCISE       Strength Training for Adults           •Decrease stress. This may help lower your blood pressure. Learn ways to relax, such as deep breathing or listening to music.      •Limit alcohol as directed. Alcohol can increase your blood pressure. A drink of alcohol is 12 ounces of beer, 5 ounces of wine, or 1½ ounces of liquor.      •Do not smoke. Nicotine and other chemicals in cigarettes and cigars can increase your blood pressure and also cause lung damage. Ask your healthcare provider for information if you currently smoke and need help to quit. E-cigarettes or smokeless tobacco still contain nicotine. Talk to your healthcare provider before you use these products.  Prevent Heart Disease               © Copyright [employer] 2021           back to top                          © Copyright [employer] 2021

## 2021-05-23 NOTE — ED PROVIDER NOTE - PATIENT PORTAL LINK FT
You can access the FollowMyHealth Patient Portal offered by [facility] by registering at the following website: [email or web address]. By joining [employer]’s FollowMyHealth portal, you will also be able to view your health information using other applications (apps) compatible with our system.

## 2021-05-23 NOTE — ED PROVIDER NOTE - OBJECTIVE STATEMENT
54 y/o male with hx of HTN, hypothyroidism requesting med refill. pt states returning from [place] and unable to fill tx given while in [place]. pt reports last medication dose 24 hrs ago. no cp, sob, abd pain, n/v. no ha or dizziness. no further complaints.

## 2021-06-14 ENCOUNTER — EMERGENCY (EMERGENCY)
Facility: HOSPITAL | Age: 56
LOS: 1 days | Discharge: HOME | End: 2021-06-14
Admitting: EMERGENCY MEDICINE
Payer: MEDICAID

## 2021-06-14 VITALS
SYSTOLIC BLOOD PRESSURE: 160 MMHG | RESPIRATION RATE: 18 BRPM | HEIGHT: 68 IN | TEMPERATURE: 98 F | DIASTOLIC BLOOD PRESSURE: 87 MMHG | OXYGEN SATURATION: 95 % | HEART RATE: 80 BPM

## 2021-06-14 DIAGNOSIS — S22.39XA FRACTURE OF ONE RIB, UNSPECIFIED SIDE, INITIAL ENCOUNTER FOR CLOSED FRACTURE: Chronic | ICD-10-CM

## 2021-06-14 DIAGNOSIS — Z76.0 ENCOUNTER FOR ISSUE OF REPEAT PRESCRIPTION: ICD-10-CM

## 2021-06-14 DIAGNOSIS — E03.9 HYPOTHYROIDISM, UNSPECIFIED: ICD-10-CM

## 2021-06-14 DIAGNOSIS — I10 ESSENTIAL (PRIMARY) HYPERTENSION: ICD-10-CM

## 2021-06-14 DIAGNOSIS — Z88.0 ALLERGY STATUS TO PENICILLIN: ICD-10-CM

## 2021-06-14 DIAGNOSIS — Z88.1 ALLERGY STATUS TO OTHER ANTIBIOTIC AGENTS STATUS: ICD-10-CM

## 2021-06-14 DIAGNOSIS — Z88.2 ALLERGY STATUS TO SULFONAMIDES: ICD-10-CM

## 2021-06-14 PROCEDURE — 99283 EMERGENCY DEPT VISIT LOW MDM: CPT

## 2021-06-14 PROCEDURE — 99281 EMR DPT VST MAYX REQ PHY/QHP: CPT

## 2021-06-14 RX ORDER — LEVOTHYROXINE SODIUM 125 MCG
1 TABLET ORAL
Qty: 30 | Refills: 0
Start: 2021-06-14 | End: 2021-07-16

## 2021-06-14 RX ORDER — LEVOTHYROXINE SODIUM 125 MCG
1 TABLET ORAL
Qty: 30 | Refills: 0
Start: 2021-06-14 | End: 2021-07-13

## 2021-06-14 RX ORDER — AMLODIPINE BESYLATE 2.5 MG/1
5 TABLET ORAL ONCE
Refills: 0 | Status: COMPLETED | OUTPATIENT
Start: 2021-06-14 | End: 2021-06-14

## 2021-06-14 RX ORDER — AMLODIPINE BESYLATE 2.5 MG/1
1 TABLET ORAL
Qty: 30 | Refills: 0
Start: 2021-06-14 | End: 2021-07-13

## 2021-06-14 RX ORDER — LEVOTHYROXINE SODIUM 125 MCG
200 TABLET ORAL ONCE
Refills: 0 | Status: COMPLETED | OUTPATIENT
Start: 2021-06-14 | End: 2021-06-14

## 2021-06-14 RX ADMIN — AMLODIPINE BESYLATE 5 MILLIGRAM(S): 2.5 TABLET ORAL at 12:06

## 2021-06-14 RX ADMIN — Medication 200 MICROGRAM(S): at 12:07

## 2021-06-14 NOTE — ED PROVIDER NOTE - NS ED ROS FT
Constitutional: No fever. No chills.  Eyes: No redness. No discharge. No vision change.   ENT: No sore throat. No ear pain.  Cardiovascular: No chest pain. No leg swelling.  Respiratory: No cough. No shortness of breath.  GI: No abdominal pain. No vomiting. No diarrhea.   MSK: No joint pain. No back pain.   Skin: No rash. No abrasions.   Neuro: No numbness. No weakness.   Psych: No known mental health issues.

## 2021-06-14 NOTE — ED ADULT NURSE NOTE - NSIMPLEMENTINTERV_GEN_ALL_ED
Implemented All Universal Safety Interventions:  [place] to call system. Call bell, personal items and telephone within reach. Instruct patient to call for assistance. Room bathroom lighting operational. Non-slip footwear when patient is off stretcher. Physically safe environment: no spills, clutter or unnecessary equipment. Stretcher in lowest position, wheels locked, appropriate side rails in place.

## 2021-06-14 NOTE — ED PROVIDER NOTE - PATIENT PORTAL LINK FT
You can access the FollowMyHealth Patient Portal offered by [facility] by registering at the following website: [email or web address]. By joining [employer]’s FollowMyHealth portal, you will also be able to view your health information using other applications (apps) compatible with our system.

## 2021-06-14 NOTE — ED PROVIDER NOTE - CLINICAL SUMMARY MEDICAL DECISION MAKING FREE TEXT BOX
Afebrile and hemodynamically stable. He has elevated blood pressure. Given synthroid 200mcg and amlodipine 5mg while in ED (home doses). Given rx for both medications and provided with primary care doctor information. Return precautions given.

## 2021-06-14 NOTE — ED PROVIDER NOTE - OBJECTIVE STATEMENT
56yo male with pmhx of HTN, hypothyroidism, frequent ED visits requesting medication refills, presents requesting refill on synthroid and BP medication. States he lost his most recent prescription and does not currently have a primary care doctor. He denies other complaints today including headache, vision change, chest pain, shortness of breath, numbness, weakness.

## 2021-06-14 NOTE — ED PROVIDER NOTE - NSFOLLOWUPCLINICS_GEN_ALL_ED_FT
[facility]  Family Medicine  [address] [place] [address]  Phone: [phone]  Fax:     [address]  Family Medicine  [address] [place] [address]  Phone: [phone]  Fax: [phone]

## 2021-06-14 NOTE — ED PROVIDER NOTE - NSFOLLOWUPINSTRUCTIONS_ED_ALL_ED_FT
Please take medications as prescribed.    It is important to follow up with a primary care doctor for routine bloodwork, urine testing and future medication refills.    Return to the emergency department if you develop fever>100.4F, chest pain, shortness of breath, or any other concerns.

## 2021-06-17 ENCOUNTER — EMERGENCY (EMERGENCY)
Facility: HOSPITAL | Age: 56
LOS: 1 days | Discharge: HOME | End: 2021-06-17
Attending: EMERGENCY MEDICINE | Admitting: EMERGENCY MEDICINE
Payer: MEDICAID

## 2021-06-17 VITALS
OXYGEN SATURATION: 96 % | HEIGHT: 68 IN | HEART RATE: 100 BPM | WEIGHT: 179.9 LBS | RESPIRATION RATE: 18 BRPM | TEMPERATURE: 99 F | DIASTOLIC BLOOD PRESSURE: 94 MMHG | SYSTOLIC BLOOD PRESSURE: 137 MMHG

## 2021-06-17 DIAGNOSIS — Z88.1 ALLERGY STATUS TO OTHER ANTIBIOTIC AGENTS STATUS: ICD-10-CM

## 2021-06-17 DIAGNOSIS — Z76.0 ENCOUNTER FOR ISSUE OF REPEAT PRESCRIPTION: ICD-10-CM

## 2021-06-17 DIAGNOSIS — E03.9 HYPOTHYROIDISM, UNSPECIFIED: ICD-10-CM

## 2021-06-17 DIAGNOSIS — Z88.0 ALLERGY STATUS TO PENICILLIN: ICD-10-CM

## 2021-06-17 DIAGNOSIS — I10 ESSENTIAL (PRIMARY) HYPERTENSION: ICD-10-CM

## 2021-06-17 DIAGNOSIS — Z79.83 LONG TERM (CURRENT) USE OF BISPHOSPHONATES: ICD-10-CM

## 2021-06-17 DIAGNOSIS — Z88.2 ALLERGY STATUS TO SULFONAMIDES: ICD-10-CM

## 2021-06-17 DIAGNOSIS — S22.39XA FRACTURE OF ONE RIB, UNSPECIFIED SIDE, INITIAL ENCOUNTER FOR CLOSED FRACTURE: Chronic | ICD-10-CM

## 2021-06-17 PROCEDURE — 99283 EMERGENCY DEPT VISIT LOW MDM: CPT

## 2021-06-17 PROCEDURE — 99281 EMR DPT VST MAYX REQ PHY/QHP: CPT

## 2021-06-17 RX ORDER — AMLODIPINE BESYLATE 2.5 MG/1
1 TABLET ORAL
Qty: 30 | Refills: 0
Start: 2021-06-17 | End: 2021-07-16

## 2021-06-17 NOTE — ED PROVIDER NOTE - CLINICAL SUMMARY MEDICAL DECISION MAKING FREE TEXT BOX
Will review with pt she is due now    Last Procedure:  4/16/2014  Last Diagnosis:  History of colon polyps  Recalled for (years): 3 years  Sedation used previously: IV  Last Prep Used (if known):  Miralax prep  Quality of prep (if known):  Prep was good  A medication just refilled at recent visit. pt reports it went to wrong pharmacy. resent. no indication for labs, imaging or inpatient hospitalization at this time. will dc w/ outpatient pcp fu. strict return precautions. pt agrees w/ plan. questions answered.

## 2021-06-17 NOTE — ED ADULT NURSE REASSESSMENT NOTE - NS ED NURSE REASSESS COMMENT FT1
Patient discharged to home after prescriptions sent to pharmacy.  Patient asking for alcoholic drink before discharge, refused to leave until given pina colada.  Security called and patient escorted out of ED for patient and staff safety.

## 2021-06-17 NOTE — ED PROVIDER NOTE - OBJECTIVE STATEMENT
55M htn on amlodipine 5, hypothyroidism on synthroid 200mcg, multiple ED visits for synthroid/anti-htn Rx refill (last seen 6/14/21 w/ 30d Rx for both amlodipine/synthroid), now c/o "did not get refill." pt states Rx was supposed to be sent to [facility] [address]

## 2021-06-17 NOTE — ED PROVIDER NOTE - PHYSICAL EXAMINATION
CONST: nontoxic NAD speaking in full sentences  HEAD: atraumatic  EYES: conjunctivae clear, PERRL, EOMI  ENT: mmm  NECK: supple/FROM  CARD: rrr no murmurs  CHEST: ctab no r/r/w  ABD: soft, nd, nttp, no rebound/guarding  EXT: FROM, symmetric distal pulses intact  SKIN: warm, dry, no rash, cap refill <2sec  NEURO: a+ox3, 5/5 strength x4, gross sensation intact x4, baseline gait

## 2021-06-17 NOTE — ED PROVIDER NOTE - NSFOLLOWUPINSTRUCTIONS_ED_ALL_ED_FT
PLEASE FOLLOW-UP WITH YOUR PCP FOR ALL FURTHER MEDICATION REFILLS.    PLEASE RETURN TO THE EMERGENCY DEPARTMENT IF FEVER, CHEST PAIN, SHORTNESS OF BREATH, ABDOMINAL PAIN, VOMITING, OTHER CONCERNING SYMPTOMS.                    HYPOTHYROIDISM - Discharge Care           Hypothyroidism    WHAT YOU NEED TO KNOW:    Hypothyroidism is a condition that develops when the thyroid gland does not make enough thyroid hormone. Thyroid hormones help control body temperature, heart rate, growth, and weight.    Thyroid and Parathyroid Glands         DISCHARGE INSTRUCTIONS:    Call your local emergency number (911 in the [place]) or have someone call if:   •You have sudden chest pain or shortness of breath.      •You have a seizure.      •You feel like you are going to faint.      Seek care immediately if:   •You have diarrhea, tremors, or trouble sleeping.      •Your legs, ankles, or feet are swollen.      Call your doctor or endocrinologist if:   •You have a fever.      •You have chills, a cough, or feel weak and achy.      •You have pain and swelling in your muscles and joints.      •Your skin is itchy, swollen, or you have a rash.      •Your signs and symptoms return or get worse, even after treatment.      •You have questions or concerns about your condition or care.      Medicines:   •Thyroid hormone replacement medicine helps bring your thyroid hormone level back to normal. You will need to take this medicine for the rest of your life to control hypothyroidism. It is important to take the medicine every day as directed. You will be given instructions for what to do if you miss a dose.      •Take your medicine as directed. Contact your healthcare provider if you think your medicine is not helping or if you have side effects. Tell him or her if you are allergic to any medicine. Keep a list of the medicines, vitamins, and herbs you take. Include the amounts, and when and why you take them. Bring the list or the pill bottles to follow-up visits. Carry your medicine list with you in case of an emergency.      Manage hypothyroidism:   •Get more iodine. The thyroid gland uses iodine to work correctly and to make thyroid hormones. Your healthcare provider may tell you to eat foods that are rich in iodine. He or she will tell you how much of these foods to eat. Milk and seafood are good sources of iodine. You may also need iodine supplements.      •Keep track of your blood pressure and weight: ?Check your blood pressure and write it down as often as directed. It is important to measure your blood pressure on the same arm and in the same position each time. Keep track of your blood pressure readings, along with the date and time you took them. Take this record with you to your followup visits.  How to take a Blood Pressure           ?Weigh yourself daily before breakfast after you urinate. Weight gain may be a sign of extra fluid in your body. Keep track of your daily weights and take the record with you to your followup visits.  Weight Checks JESUS MANUEL             Follow up with your doctor or endocrinologist as directed: You may need to return for more blood tests to check your thyroid hormone level. This will show if you are getting the right amount of thyroid medicine. Write down your questions so you remember to ask them during your visits.       © Copyright [employer] 2021           back to top                          © Copyright [employer] 2021

## 2021-06-17 NOTE — ED PROVIDER NOTE - PATIENT PORTAL LINK FT
You can access the FollowMyHealth Patient Portal offered by [facility] by registering at the following website: [email or web address]. By joining [employer]’s FollowMyHealth portal, you will also be able to view your health information using other applications (apps) compatible with our system.

## 2021-06-18 NOTE — ED ADULT NURSE NOTE - OBJECTIVE STATEMENT
high risk/yes 54 y/o male with hx of polysubstance abuse and hypothyroidism arrived to [facility] ER reporting left hand pain after punching a door 5 days ago. upon assessment, no visible injuries noted, patient has full ROM of left hand, abdomen soft, lung fields WNL, breathing is equal and unlabored, pulses palpable. pt denies chest pain, headache, dizziness, blurred vision, slurred speech, nausea, vomiting, diarrhea, fever, chills, LOC, SOB, weakness, fatigue, recent travel, recent injury, and palpitations. Care in progress.

## 2021-09-06 NOTE — ED ADULT TRIAGE NOTE - TEMPERATURE IN FAHRENHEIT (DEGREES F)
98.3
12 yo F from home for heavy menses x 1 month. Now feeling dizziness. Reported to have abnormal hematology labs as per stepmom. No other PMH. Vaccines UTD.

## 2021-09-14 NOTE — ED ADULT TRIAGE NOTE - CCCP TRG CHIEF CMPLNT
medication refill Detail Level: Detailed Depth Of Biopsy: dermis Was A Bandage Applied: Yes Size Of Lesion In Cm: 0 Biopsy Type: H and E Biopsy Method: Dermablade Anesthesia Type: 1% lidocaine with epinephrine Anesthesia Volume In Cc (Will Not Render If 0): 0.5 Hemostasis: Aluminum Chloride Wound Care: Petrolatum Dressing: bandage Destruction After The Procedure: No Type Of Destruction Used: Curettage Curettage Text: The wound bed was treated with curettage after the biopsy was performed. Cryotherapy Text: The wound bed was treated with cryotherapy after the biopsy was performed. Electrodesiccation Text: The wound bed was treated with electrodesiccation after the biopsy was performed. Electrodesiccation And Curettage Text: The wound bed was treated with electrodesiccation and curettage after the biopsy was performed. Silver Nitrate Text: The wound bed was treated with silver nitrate after the biopsy was performed. Lab: 6 Lab Facility: 3 Consent: Written consent was obtained and risks were reviewed including but not limited to scarring, infection, bleeding, scabbing, incomplete removal, nerve damage and allergy to anesthesia. Post-Care Instructions: I reviewed with the patient in detail post-care instructions. Patient is to keep the biopsy site dry overnight, and then apply bacitracin twice daily until healed. Patient may apply hydrogen peroxide soaks to remove any crusting. Notification Instructions: Patient will be notified of biopsy results. However, patient instructed to call the office if not contacted within 2 weeks. Billing Type: Third-Party Bill Information: Selecting Yes will display possible errors in your note based on the variables you have selected. This validation is only offered as a suggestion for you. PLEASE NOTE THAT THE VALIDATION TEXT WILL BE REMOVED WHEN YOU FINALIZE YOUR NOTE. IF YOU WANT TO FAX A PRELIMINARY NOTE YOU WILL NEED TO TOGGLE THIS TO 'NO' IF YOU DO NOT WANT IT IN YOUR FAXED NOTE.

## 2021-12-25 ENCOUNTER — EMERGENCY (EMERGENCY)
Facility: HOSPITAL | Age: 56
LOS: 1 days | Discharge: HOME | End: 2021-12-25
Admitting: EMERGENCY MEDICINE
Payer: MEDICAID

## 2021-12-25 VITALS
OXYGEN SATURATION: 96 % | WEIGHT: 190.04 LBS | DIASTOLIC BLOOD PRESSURE: 81 MMHG | SYSTOLIC BLOOD PRESSURE: 137 MMHG | HEART RATE: 97 BPM | TEMPERATURE: 98 F | HEIGHT: 68 IN | RESPIRATION RATE: 17 BRPM

## 2021-12-25 DIAGNOSIS — Z76.0 ENCOUNTER FOR ISSUE OF REPEAT PRESCRIPTION: ICD-10-CM

## 2021-12-25 DIAGNOSIS — S22.39XA FRACTURE OF ONE RIB, UNSPECIFIED SIDE, INITIAL ENCOUNTER FOR CLOSED FRACTURE: Chronic | ICD-10-CM

## 2021-12-25 DIAGNOSIS — M54.50 LOW BACK PAIN, UNSPECIFIED: ICD-10-CM

## 2021-12-25 DIAGNOSIS — Z88.1 ALLERGY STATUS TO OTHER ANTIBIOTIC AGENTS STATUS: ICD-10-CM

## 2021-12-25 DIAGNOSIS — Z88.0 ALLERGY STATUS TO PENICILLIN: ICD-10-CM

## 2021-12-25 DIAGNOSIS — G89.29 OTHER CHRONIC PAIN: ICD-10-CM

## 2021-12-25 DIAGNOSIS — E03.9 HYPOTHYROIDISM, UNSPECIFIED: ICD-10-CM

## 2021-12-25 PROCEDURE — 99283 EMERGENCY DEPT VISIT LOW MDM: CPT

## 2021-12-25 PROCEDURE — 99284 EMERGENCY DEPT VISIT MOD MDM: CPT

## 2021-12-25 RX ORDER — IBUPROFEN 200 MG
600 TABLET ORAL ONCE
Refills: 0 | Status: COMPLETED | OUTPATIENT
Start: 2021-12-25 | End: 2021-12-25

## 2021-12-25 RX ORDER — LEVOTHYROXINE SODIUM 125 MCG
200 TABLET ORAL ONCE
Refills: 0 | Status: COMPLETED | OUTPATIENT
Start: 2021-12-25 | End: 2021-12-25

## 2021-12-25 RX ORDER — LEVOTHYROXINE SODIUM 125 MCG
1 TABLET ORAL
Qty: 30 | Refills: 0
Start: 2021-12-25 | End: 2022-01-23

## 2021-12-25 RX ADMIN — Medication 200 MICROGRAM(S): at 21:46

## 2021-12-25 RX ADMIN — Medication 600 MILLIGRAM(S): at 21:46

## 2021-12-25 NOTE — ED PROVIDER NOTE - NSFOLLOWUPCLINICS_GEN_ALL_ED_FT
[facility]  Family Medicine  [address]  [place], [place] [address]  Phone: [phone]  Fax:

## 2021-12-25 NOTE — ED PROVIDER NOTE - NSFOLLOWUPINSTRUCTIONS_ED_ALL_ED_FT
Follow up with primary care provider as soon as possible.  =========================    Chronic Back Pain    WHAT YOU NEED TO KNOW:    Chronic back pain is back pain that lasts 3 months or longer. This may include pain that has not been controlled or does not improve with treatment. Your back pain may cause weakness or pain that spreads to your arms or legs.    DISCHARGE INSTRUCTIONS:    Call your doctor if:   •You have severe pain.      •You have new numbness, tingling, or weakness, especially in your lower back, legs, arms, or genital area.      •You lose control of your bladder or bowel movements.       •You have a fever or sudden weight loss.      •You have new or worse pain.      •You have questions or concerns about your condition or care.      Medicines: You may need any of the following:   •NSAIDs help decrease swelling and pain or fever. This medicine is available with or without a doctor's order. NSAIDs can cause stomach bleeding or kidney problems in certain people. If you take blood thinner medicine, always ask your healthcare provider if NSAIDs are safe for you. Always read the medicine label and follow directions.      •Acetaminophen decreases pain and fever. It is available without a doctor's order. Ask how much to take and how often to take it. Follow directions. Read the labels of all other medicines you are using to see if they also contain acetaminophen, or ask your doctor or pharmacist. Acetaminophen can cause liver damage if not taken correctly. Do not use more than 4 grams (4,000 milligrams) total of acetaminophen in one day.       •Muscle relaxers help decrease muscle spasms and back pain.      •Prescription pain medicine called narcotics or opioids may be given for certain types of chronic pain. Ask your healthcare provider how to take this medicine safely.      •Take your medicine as directed. Contact your healthcare provider if you think your medicine is not helping or if you have side effects. Tell him or her if you are allergic to any medicine. Keep a list of the medicines, vitamins, and herbs you take. Include the amounts, and when and why you take them. Bring the list or the pill bottles to follow-up visits. Carry your medicine list with you in case of an emergency.      Manage your symptoms:   •Apply ice for 15 to 20 minutes every hour, or as directed. Use an ice pack, or put crushed ice in a plastic bag. Cover it with a towel before you apply it to your skin. Ice decreases pain and helps prevent tissue damage.      •Apply heat for 20 to 30 minutes every 2 hours, or as directed. Heat helps decrease pain and muscle spasms.      •Use massage to loosen tense muscles. Massage may relieve back pain caused by tight muscles. Regular massages may help prevent this kind of back pain.      •Ask about acupuncture for pain relief. Back pain is sometimes relieved with acupuncture. Talk to your healthcare provider before you get this treatment to make sure it is safe for you.      Other ways to relieve or prevent back pain:   •Manage stress. Stress can cause back pain or make it worse. Some ways to reduce stress are listening to music, meditating, or using aromatherapy. It may help to talk with a therapist about anything that is causing you stress. Your healthcare provider can give you more information.      •Stay active as much as you can without causing more pain. Ask your healthcare provider what exercises are right for you. Do not sit or lie down for long periods. This could make your back pain worse. Yoga or similar gentle movements may help relieve pain and tension in your back. Go slowly and do not strain your back as you do any movement.      •Be careful when you lift heavy objects. Do not lift anything heavy until your pain is gone. Never strain your back when you lift a heavy item. If possible, ask someone to help you.      •Go to physical therapy as directed. A physical therapist can teach you exercises to help improve movement and strength, and to decrease pain.      Follow up with your healthcare provider as directed: You may be referred to a sports medicine or spine specialist. Write down your questions so you remember to ask them during your visits.  ==================    Medicine Refill    WHAT YOU NEED TO KNOW:    You may have been given a prescription in the emergency department for a few days of your medicine. It is important to refill your medicine before you completely run out. You need to follow up with your healthcare provider for a full prescription within the next few days. You will not be given additional refills in the emergency department.     DISCHARGE INSTRUCTIONS:    Follow up with your healthcare provider: Contact your healthcare provider before you are completely out of medicine. Write down your questions so you remember to ask them during your visits.     Refill tips: Your medicine will treat your condition if you take the medicine regularly. Prevent missed doses by doing the following:   •Keep a chart of your medicine. Include all of your current medicines. Write down the name and strength of each medicine, the prescription number, and the number of refills. Also write down the dates of your refills. Ask your pharmacy or insurance provider for other ways to help you keep track of your medicines.      •Refill medicines a few days before you run out. This will decrease any problems that will prevent you from getting your medicines on time. Problems include a closed pharmacy, or the pharmacy may have to contact your healthcare provider.      •If you know you are going to be traveling, refill your medicines before you leave. You may not be able to get refills if you do not use your local pharmacy. You may need to call your insurance provider to make them aware of your travels.

## 2021-12-25 NOTE — ED PROVIDER NOTE - CLINICAL SUMMARY MEDICAL DECISION MAKING FREE TEXT BOX
Chronic low back pain, no red flags for dangerous back pain. Hx of substance abuse but afebrile, no neuro deficits..  Also refill synthroid.  Will rx 1 month supply as patient states it will take him time to arrange follow up with new primary care provider.  Return precautions given.

## 2021-12-25 NOTE — ED PROVIDER NOTE - OBJECTIVE STATEMENT
55 y/o M with PMHx of polysubstance abuse, hypothyroidism, comes to ED requesting medication refill. Patient states he ran out of his synthroid 200mcg a few days ago. He also has been out and walking a lot today, after several hours his chronic lower back pain began to worsen. Pain is across lower back, described as "soreness" and does not radiate to legs, abdomen, upper back or chest. No weakness or paresthsia in limbs, fever, chills, urinary incontinence, urinary retention, bowel dysfunction.

## 2021-12-25 NOTE — ED PROVIDER NOTE - PATIENT PORTAL LINK FT
You can access the FollowMyHealth Patient Portal offered by [facility] by registering at the following website: [email or web address]. By joining [facility]’s FollowMyHealth portal, you will also be able to view your health information using other applications (apps) compatible with our system.

## 2022-01-01 NOTE — ED PROVIDER NOTE - NS_EDPROVIDERDISPOUSERTYPE_ED_A_ED
I have personally evaluated and examined the patient. The Attending was available to me as a supervising provider if needed.
2022

## 2022-03-01 ENCOUNTER — EMERGENCY (EMERGENCY)
Facility: HOSPITAL | Age: 57
LOS: 1 days | Discharge: HOME | End: 2022-03-01
Admitting: STUDENT IN AN ORGANIZED HEALTH CARE EDUCATION/TRAINING PROGRAM
Payer: MEDICAID

## 2022-03-01 VITALS
HEART RATE: 80 BPM | TEMPERATURE: 98 F | WEIGHT: 184.97 LBS | SYSTOLIC BLOOD PRESSURE: 120 MMHG | RESPIRATION RATE: 16 BRPM | HEIGHT: 68 IN | DIASTOLIC BLOOD PRESSURE: 87 MMHG | OXYGEN SATURATION: 97 %

## 2022-03-01 DIAGNOSIS — S22.39XA FRACTURE OF ONE RIB, UNSPECIFIED SIDE, INITIAL ENCOUNTER FOR CLOSED FRACTURE: Chronic | ICD-10-CM

## 2022-03-01 PROCEDURE — 99281 EMR DPT VST MAYX REQ PHY/QHP: CPT

## 2022-03-01 PROCEDURE — 99283 EMERGENCY DEPT VISIT LOW MDM: CPT

## 2022-03-01 RX ORDER — LEVOTHYROXINE SODIUM 125 MCG
1 TABLET ORAL
Qty: 14 | Refills: 0
Start: 2022-03-01 | End: 2022-03-14

## 2022-03-01 RX ORDER — LEVOTHYROXINE SODIUM 125 MCG
200 TABLET ORAL ONCE
Refills: 0 | Status: COMPLETED | OUTPATIENT
Start: 2022-03-01 | End: 2022-03-01

## 2022-03-01 RX ADMIN — Medication 200 MICROGRAM(S): at 17:33

## 2022-03-01 NOTE — ED ADULT NURSE NOTE - OBJECTIVE STATEMENT
pt presents requesting synthroid, has not had in a couple days. patient denies any signs of symptoms. patient well-appearing

## 2022-03-01 NOTE — ED PROVIDER NOTE - NSFOLLOWUPINSTRUCTIONS_ED_ALL_ED_FT
Follow up at a [facility] primary care clinic - see the handout provided. Follow up at a [facility] primary care clinic - see the handout provided.  Phone [phone]

## 2022-03-01 NOTE — ED PROVIDER NOTE - PHYSICAL EXAMINATION
CONSTITUTIONAL: NAD   SKIN: Normal color and turgor.    HEAD: NC/AT.  EYES: Conjunctiva clear. EOMI. PERRL.  No exophthalmos.  ENT: Airway clear. Normal voice.   RESPIRATORY:  Normal work of breathing. Lungs CTAB.  CARDIOVASCULAR:  RRR, S1S2. No M/R/G.      GI:  Abdomen soft, nontender.    MSK: Neck supple.  No edema.  No muscular tenderness. No joint swelling or ROM limitation.  NEURO: Awake and alert; CN: grossly intact. Speech clear.  CHA. Ambulating well.

## 2022-03-01 NOTE — ED ADULT NURSE NOTE - PAIN RATING/NUMBER SCALE (0-10): ACTIVITY
0 Mohs Rapid Report Verbiage: The area of clinically evident tumor was marked with skin marking ink and appropriately hatched.  The initial incision was made following the Mohs approach through the skin.  The specimen was taken to the lab, divided into the necessary number of pieces, chromacoded and processed according to the Mohs protocol.  This was repeated in successive stages until a tumor free defect was achieved.

## 2022-03-01 NOTE — ED PROVIDER NOTE - CLINICAL SUMMARY MEDICAL DECISION MAKING FREE TEXT BOX
No acute complaints, here for Synthroid refill.  No s/s to suggest severe hypo or hyperthyroidism, ie myxedema or thyroid storm.  VS WNL, alert and well-appearing.  Advised to follow up with primary care provider. Offered the info for [facility], but he says "I've tried them, they want a co-pay; plus I want to go somewhere near my home in [place]".  I advised he try a [facility] clinic, to which he agreed.

## 2022-03-01 NOTE — ED PROVIDER NOTE - OBJECTIVE STATEMENT
57 yo m with hx hypothyroidism, substance abuse; states he ran out of synthroid 3-4 days ago.  Has no complaints, just wants Synthroid refill.  Comes to ED frequently for this, and requests 30-day supply.  Says he hasn't gotten around to see a primary care provider in a while, "but I plan to".  Alert and oriented, no hallucinations/SI/HI.  Denies active drug use; "I got myself cleaned up, no more drugs".

## 2022-03-01 NOTE — ED ADULT TRIAGE NOTE - BMI (KG/M2)
Quality 137: Melanoma: Continuity Of Care - Recall System: Patient information entered into a recall system that includes: target date for the next exam specified AND a process to follow up with patients regarding missed or unscheduled appointments 28.1 Quality 111:Pneumonia Vaccination Status For Older Adults: Pneumococcal Vaccination Previously Received Quality 138: Melanoma: Coordination Of Care: A treatment plan was communicated to the physicians providing continuing care within one month of diagnosis outlining: diagnosis, tumor thickness and a plan for surgery or alternate care. Quality 397: Melanoma: Reporting: The pathology report includes a pT Category and statement on thickness and ulceration for pT1, mitotic rate. Detail Level: Detailed Quality 110: Preventive Care And Screening: Influenza Immunization: Influenza Immunization previously received during influenza season

## 2022-03-01 NOTE — ED PROVIDER NOTE - PATIENT PORTAL LINK FT
You can access the FollowMyHealth Patient Portal offered by [facility] by registering at the following website: [email or web address]. By joining [facility]’s FollowMyHealth portal, you will also be able to view your health information using other applications (apps) compatible with our system.

## 2022-03-03 DIAGNOSIS — E03.9 HYPOTHYROIDISM, UNSPECIFIED: ICD-10-CM

## 2022-03-03 DIAGNOSIS — Z88.2 ALLERGY STATUS TO SULFONAMIDES: ICD-10-CM

## 2022-03-03 DIAGNOSIS — Z76.0 ENCOUNTER FOR ISSUE OF REPEAT PRESCRIPTION: ICD-10-CM

## 2022-03-03 DIAGNOSIS — Z88.0 ALLERGY STATUS TO PENICILLIN: ICD-10-CM

## 2022-03-03 DIAGNOSIS — Z88.1 ALLERGY STATUS TO OTHER ANTIBIOTIC AGENTS STATUS: ICD-10-CM

## 2022-04-12 NOTE — ED ADULT NURSE NOTE - ED CARDIAC RHYTHM
regular Split-Thickness Skin Graft Text: The defect edges were debeveled with a #15 scalpel blade.  Given the location of the defect, shape of the defect and the proximity to free margins a split thickness skin graft was deemed most appropriate.  Using a sterile surgical marker, the primary defect shape was transferred to the donor site. The split thickness graft was then harvested.  The skin graft was then placed in the primary defect and oriented appropriately.

## 2022-04-20 NOTE — ED PROVIDER NOTE - CPE EDP MUSC NORM
normal... Azelaic Acid Pregnancy And Lactation Text: This medication is considered safe during pregnancy and breast feeding.

## 2022-05-03 NOTE — ED ADULT NURSE NOTE - CARDIO WDL
SLEEP STUDY INSTRUCTIONS    1. Our main concern is to provide the best test and evaluation of your sleep and your cooperation in following the guidelines is very necessary.    2. We have no facilities for family members or guests at the sleep center. Special arrangements will be made for children requiring overnight sleep studies.    3. Unless otherwise instructed, AVOID alcoholic beverages on the day of your sleep study.    4. DO NOT drink coffee or caffeine-containing beverages after 12:00 noon on the day of your sleep study.    5. There is NO smoking at the sleep center.    6. Try to maintain a usual daytime schedule prior to the study (avoid unusual physical activity or meals).    7. DO NOT take a nap on the day of your study.    8. This is an outpatient procedure (test); therefore, nursing services, medications, and meals ARE NOT provided. If you take medications, bring them with you and take them on the schedule you do at home.    9. Please fill your sleep aid prescription (Ambien or Lunesta) and bring to your sleep study. Even patients who normally have no problem going to sleep often need a sleep aid in this different environment.    10. We ask that you wear conventional sleep attire (pajamas or sweats) for the sleep study. We discourage patients from wearing only their underwear to bed. We recommend two-piece pajamas as the techs will need to apply sensors to your stomach.    11. Please shampoo your hair the day of the sleep study. Please DO NOT use any other hair or skin products before your arrival (e.g., mousse, gel, hair or body spray, perfume, body lotion etc.) NOTE: Women should not wear heavy makeup prior to arrival as some wires are taped to the face area.    12. The technician will be applying several small electrodes to the scalp, eye area, chin, chest, and legs, plus respiratory effort belts around the chest. Also, there will be a device placed directly under the nose. (THIS WILL NOT OBSTRUCT  YOUR BREATHING.) This is a painless procedure and the skin is not broken.    13. The test is generally completed in six to eight hours; We are usually done between 6 - 7 a.m., unless you are scheduled for a nap study. You may need to come back another night for a second study to complete your treatment plan.    14. Patients who are scheduled for an MSLT (nap study) will stay at the sleep center for the day following their nighttime study. You will be notified if a nap study was ordered for you at the time the night study is scheduled. Generally, patients having a nap study will leave the sleep center by 4 p.m.    15. You will need to bring food for the following day if you are scheduled for a nap study. A refrigerator and microwave are available.    16. A bathroom is available for your use.    17. We are able to adjust the room temperature for your comfort. Please let the technologist know if you are uncomfortable during the study.    18. If you sleep better with a special pillow or stuffed animal, you may bring it along. Service animals are the only live animals permitted.    19. Cable T.V. is available.    20. You will be scheduled for a follow-up appointment three to five days after the sleep study to review your results.    21. A copy of your sleep study is sent to the referring physician approximately two weeks after your study.    22. Any questions can be directed to our staff at [phone].    23. If CPAP therapy is applied, a home unit will be ordered for you through the [employer] medical equipment company. You will be contacted to schedule delivery after insurance authorization.   Normal rate, regular rhythm, normal S1, S2 heart sounds heard.

## 2022-05-31 NOTE — ED ADULT TRIAGE NOTE - CCCP TRG CHIEF CMPLNT
-- DO NOT REPLY / DO NOT REPLY ALL --  -- Message is from the [facility]--    General Patient Message      Reason for Call: patient had to cancel appointment for tomorrow morning with provider because he tested positive for covid-19.    Caller Information       Type Contact Phone    05/31/2022 08:10 AM CDT Phone (Incoming) KamGio ramsay (Self) [phone] (M)          Alternative phone number: na    Turnaround time given to caller:   \"This message will be sent to [state Provider's name]. The clinical team will fulfill your request as soon as they review your message.\"    
Noted. Pt treated by on call md 5-29.  
numbness

## 2022-07-26 ENCOUNTER — EMERGENCY (EMERGENCY)
Facility: HOSPITAL | Age: 57
LOS: 1 days | Discharge: HOME | End: 2022-07-26
Attending: EMERGENCY MEDICINE | Admitting: EMERGENCY MEDICINE
Payer: MEDICAID

## 2022-07-26 VITALS
HEART RATE: 68 BPM | RESPIRATION RATE: 18 BRPM | DIASTOLIC BLOOD PRESSURE: 83 MMHG | SYSTOLIC BLOOD PRESSURE: 120 MMHG | WEIGHT: 197.98 LBS | TEMPERATURE: 98 F | HEIGHT: 68 IN | OXYGEN SATURATION: 97 %

## 2022-07-26 DIAGNOSIS — F19.19 OTHER PSYCHOACTIVE SUBSTANCE ABUSE WITH UNSPECIFIED PSYCHOACTIVE SUBSTANCE-INDUCED DISORDER: ICD-10-CM

## 2022-07-26 DIAGNOSIS — S22.39XA FRACTURE OF ONE RIB, UNSPECIFIED SIDE, INITIAL ENCOUNTER FOR CLOSED FRACTURE: Chronic | ICD-10-CM

## 2022-07-26 DIAGNOSIS — Z88.1 ALLERGY STATUS TO OTHER ANTIBIOTIC AGENTS STATUS: ICD-10-CM

## 2022-07-26 DIAGNOSIS — Z76.0 ENCOUNTER FOR ISSUE OF REPEAT PRESCRIPTION: ICD-10-CM

## 2022-07-26 DIAGNOSIS — Z88.0 ALLERGY STATUS TO PENICILLIN: ICD-10-CM

## 2022-07-26 DIAGNOSIS — Z87.09 PERSONAL HISTORY OF OTHER DISEASES OF THE RESPIRATORY SYSTEM: ICD-10-CM

## 2022-07-26 DIAGNOSIS — Z87.81 PERSONAL HISTORY OF (HEALED) TRAUMATIC FRACTURE: ICD-10-CM

## 2022-07-26 DIAGNOSIS — E03.9 HYPOTHYROIDISM, UNSPECIFIED: ICD-10-CM

## 2022-07-26 PROCEDURE — 99283 EMERGENCY DEPT VISIT LOW MDM: CPT

## 2022-07-26 PROCEDURE — 99281 EMR DPT VST MAYX REQ PHY/QHP: CPT

## 2022-07-26 RX ORDER — LEVOTHYROXINE SODIUM 125 MCG
200 TABLET ORAL ONCE
Refills: 0 | Status: COMPLETED | OUTPATIENT
Start: 2022-07-26 | End: 2022-07-26

## 2022-07-26 RX ADMIN — Medication 200 MICROGRAM(S): at 11:23

## 2022-07-26 NOTE — ED PROVIDER NOTE - NSFOLLOWUPINSTRUCTIONS_ED_ALL_ED_FT
Medicine Refill at the Emergency Department    We have refilled your medicine today. However, it is best for you to get refills through your primary health care provider's office. In the future, please plan ahead so you do not need to get refills from the emergency department.    If we refilled a medicine that you take daily for a chronic problem (maintenance medicine), you may have received only enough to get you by until you are able to see your regular health care provider.    This information is not intended to replace advice given to you by your health care provider. Make sure you discuss any questions you have with your health care provider.      Follow up at clinic this week for medication refill and further testing of your thyroid gland:    [facility]/[address] [address]  Appointments:	[phone]

## 2022-07-26 NOTE — ED PROVIDER NOTE - CLINICAL SUMMARY MEDICAL DECISION MAKING FREE TEXT BOX
55 y/o M currently asymptomatic with a PMHX of hypothyroidism on Synthroid requesting medication refill. As pt noted to have multiple presentations to the ER for similar medication refill of 200 micrograms of daily Levothyroxine. Lengthy conversation with pt and given stable vitals will refer to primary care for additional TFTS and for further thyroid disfunction and medication refill moving forward. Pt is ok with plan. Pt given x1 dose of Levothyroxine in ER and defer to outpatient.

## 2022-07-26 NOTE — ED PROVIDER NOTE - OBJECTIVE STATEMENT
55 y/o M with a PMHX of Hypothyroidism on Synthroid presenting to the ED requesting medication refill as he states he ran out of his medication on Sunday. Pt denies any current symptoms. Pt has a history of multiple visits to the ER for similar medications refills and pt has no PCP.

## 2022-07-26 NOTE — ED ADULT NURSE NOTE - OBJECTIVE STATEMENT
Patient presents to the ED requesting that the boil on his right groin get checked and also requesting a rx for synthroid. Patient states that he ran out 2 days ago.

## 2022-07-26 NOTE — ED PROVIDER NOTE - PATIENT PORTAL LINK FT
You can access the FollowMyHealth Patient Portal offered by [facility] by registering at the following website: [email or web address]. By joining [facility]’s FollowMyHealth portal, you will also be able to view your health information using other applications (apps) compatible with our system.

## 2022-07-26 NOTE — ED PROVIDER NOTE - NSFOLLOWUPCLINICS_GEN_ALL_ED_FT
[facility]  Family Medicine  [address]  [place], [place] [address]  Phone: [phone]  Fax:   Follow Up Time: 1-3 Days

## 2022-09-03 ENCOUNTER — EMERGENCY (EMERGENCY)
Facility: HOSPITAL | Age: 57
LOS: 1 days | Discharge: HOME | End: 2022-09-03
Admitting: EMERGENCY MEDICINE
Payer: MEDICAID

## 2022-09-03 VITALS
TEMPERATURE: 98 F | RESPIRATION RATE: 15 BRPM | HEIGHT: 68 IN | DIASTOLIC BLOOD PRESSURE: 68 MMHG | WEIGHT: 199.96 LBS | OXYGEN SATURATION: 99 % | SYSTOLIC BLOOD PRESSURE: 92 MMHG | HEART RATE: 88 BPM

## 2022-09-03 DIAGNOSIS — S22.39XA FRACTURE OF ONE RIB, UNSPECIFIED SIDE, INITIAL ENCOUNTER FOR CLOSED FRACTURE: Chronic | ICD-10-CM

## 2022-09-03 PROCEDURE — 99282 EMERGENCY DEPT VISIT SF MDM: CPT

## 2022-09-03 PROCEDURE — 99283 EMERGENCY DEPT VISIT LOW MDM: CPT

## 2022-09-03 NOTE — ED PROVIDER NOTE - PATIENT PORTAL LINK FT
You can access the FollowMyHealth Patient Portal offered by [facility] by registering at the following website: [email or web address]. By joining [facility]’s FollowMyHealth portal, you will also be able to view your health information using other applications (apps) compatible with our system.

## 2022-09-03 NOTE — ED PROVIDER NOTE - CLINICAL SUMMARY MEDICAL DECISION MAKING FREE TEXT BOX
57 y/o m presents stating he has a pimple on his left buttock but when asked to show it to the provider he reports "it must have gone away", no findings on exam.  Pt then asked for rx refill of synthroid, admits he hasn't been to his pmd in years, advised to f/u in office for TFTs prior to prescription, discussed importance of follow up and regular visits with his physician, stable for d/c

## 2022-09-03 NOTE — ED ADULT NURSE NOTE - OBJECTIVE STATEMENT
Patient a+o x4 c/o pimple to left buttock x weeks. Patient states needs prescription refills and does not have a pcp and have been getting refills from EDs. "And I want a sandwich and some juice". Maintaining patent airway, denies sob/cp/dizziness/n/v/fever/chills. Discharge instructions given and reviewed.

## 2022-09-03 NOTE — ED PROVIDER NOTE - OBJECTIVE STATEMENT
57 y/o m presents stating he has a pimple on his left buttock.  Pt stating "its been coming and going for a few weeks."  Pt reports he took a hot shower and it went away and then came back again.  Pt also asking for a prescription refill of synthroid, stating he hasn't been to his pmd in years and goes to different EDs for prescriptions.  Denies fever, chills, all other ROS negative.

## 2022-09-05 DIAGNOSIS — R23.8 OTHER SKIN CHANGES: ICD-10-CM

## 2022-09-05 DIAGNOSIS — Z88.2 ALLERGY STATUS TO SULFONAMIDES: ICD-10-CM

## 2022-09-05 DIAGNOSIS — Z88.0 ALLERGY STATUS TO PENICILLIN: ICD-10-CM

## 2022-09-05 DIAGNOSIS — Z88.1 ALLERGY STATUS TO OTHER ANTIBIOTIC AGENTS STATUS: ICD-10-CM

## 2022-09-11 ENCOUNTER — EMERGENCY (EMERGENCY)
Facility: HOSPITAL | Age: 57
LOS: 1 days | Discharge: HOME | End: 2022-09-11
Admitting: EMERGENCY MEDICINE
Payer: MEDICAID

## 2022-09-11 VITALS
WEIGHT: 199.96 LBS | HEART RATE: 74 BPM | DIASTOLIC BLOOD PRESSURE: 109 MMHG | SYSTOLIC BLOOD PRESSURE: 169 MMHG | RESPIRATION RATE: 16 BRPM | HEIGHT: 68 IN | OXYGEN SATURATION: 98 % | TEMPERATURE: 98 F

## 2022-09-11 DIAGNOSIS — S22.39XA FRACTURE OF ONE RIB, UNSPECIFIED SIDE, INITIAL ENCOUNTER FOR CLOSED FRACTURE: Chronic | ICD-10-CM

## 2022-09-11 PROCEDURE — 96372 THER/PROPH/DIAG INJ SC/IM: CPT

## 2022-09-11 PROCEDURE — 99284 EMERGENCY DEPT VISIT MOD MDM: CPT

## 2022-09-11 PROCEDURE — 99283 EMERGENCY DEPT VISIT LOW MDM: CPT | Mod: 25

## 2022-09-11 RX ORDER — KETOROLAC TROMETHAMINE 30 MG/ML
15 SYRINGE (ML) INJECTION ONCE
Refills: 0 | Status: DISCONTINUED | OUTPATIENT
Start: 2022-09-11 | End: 2022-09-11

## 2022-09-11 RX ADMIN — Medication 15 MILLIGRAM(S): at 05:31

## 2022-09-11 NOTE — ED PROVIDER NOTE - NSFOLLOWUPINSTRUCTIONS_ED_ALL_ED_FT
Continue Ibuprofen or Tylenol for pain as needed and follow up with an orthopedist for further evaluation.       SHOULDER PAIN - [facility](R) Instructions(ER/ED)           Shoulder Pain    WHAT YOU NEED TO KNOW:    Shoulder pain is a common problem that can affect your daily activities. Pain can be caused by a problem within your shoulder, such as soreness of a tendon or bursa. A tendon is a cord of tough tissue that connects your muscles to your bones. The bursa is a fluid-filled sac that acts as a cushion between a bone and a tendon. Shoulder pain may also be caused by pain that spreads to your shoulder from another part of your body.  Shoulder Anatomy         DISCHARGE INSTRUCTIONS:    Return to the emergency department if:   •You have severe pain.      •You cannot move your arm or shoulder.      •You have numbness or tingling in your shoulder or arm.      Contact your healthcare provider if:   •Your pain gets worse or does not go away with treatment.      •You have trouble moving your arm or shoulder.      •You have questions or concerns about your condition or care.      Medicines: You may need any of the following:   •Acetaminophen decreases pain and fever. It is available without a doctor's order. Ask how much to take and how often to take it. Follow directions. Read the labels of all other medicines you are using to see if they also contain acetaminophen, or ask your doctor or pharmacist. Acetaminophen can cause liver damage if not taken correctly.      •NSAIDs, such as ibuprofen, help decrease swelling, pain, and fever. This medicine is available with or without a doctor's order. NSAIDs can cause stomach bleeding or kidney problems in certain people. If you take blood thinner medicine, always ask your healthcare provider if NSAIDs are safe for you. Always read the medicine label and follow directions.      •Take your medicine as directed. Contact your healthcare provider if you think your medicine is not helping or if you have side effects. Tell your provider if you are allergic to any medicine. Keep a list of the medicines, vitamins, and herbs you take. Include the amounts, and when and why you take them. Bring the list or the pill bottles to follow-up visits. Carry your medicine list with you in case of an emergency.      Manage your symptoms:   •Apply ice on your shoulder for 20 to 30 minutes every 2 hours or as directed. Use an ice pack, or put crushed ice in a plastic bag. Cover it with a towel before you apply it to your shoulder. Ice helps prevent tissue damage and decreases swelling and pain.      •Apply heat if ice does not help your symptoms. Apply heat on your shoulder for 20 to 30 minutes every 2 hours for as many days as directed. Heat helps decrease pain and muscle spasms.      •Limit activities as directed. Try to avoid repeated overhead movements.      •Go to physical or occupational therapy as directed. A physical therapist teaches you exercises to help improve movement and strength, and to decrease pain. An occupational therapist teaches you skills to help with your daily activities.       Prevent shoulder pain:   •Maintain a good range of motion in your shoulder. Ask your healthcare provider which exercises you should do on a regular basis after you have healed.       •Stretch and strengthen your shoulder. Use proper technique during exercises and sports.      Follow up with your healthcare provider or orthopedist as directed: Write down your questions so you remember to ask them during your visits.

## 2022-09-11 NOTE — ED PROVIDER NOTE - CLINICAL SUMMARY MEDICAL DECISION MAKING FREE TEXT BOX
57 yo male in the Er with chronic b/l shoulders pain. Pt denies any injury to his shoulders, denies weakness, numbness or pain to his b/l arms.   pt mentioned that stopped working out and his shoulders are painful now. No known h/o arthritis, no other joints pain. pt has normal exam of his b/l shoulders. no indications for Xray. he requested an injection of Toradol. d/c home stable.

## 2022-09-11 NOTE — ED PROVIDER NOTE - PATIENT PORTAL LINK FT
You can access the FollowMyHealth Patient Portal offered by [facility] by registering at the following website: [email or web address]. By joining [employer]’s FollowMyHealth portal, you will also be able to view your health information using other applications (apps) compatible with our system.

## 2022-09-11 NOTE — ED ADULT NURSE NOTE - OBJECTIVE STATEMENT
Patient is a 56yoM presenting to the ED for b/l shoulder pain x few weeks. Pt is awake and alert, axox3, spont breathing on RA. Patient states that the pain is chronic in nature, denies recent trauma. Denies CP/SOB, denies abd pain, no nausea/vomiting. Pt has full ROM to b/l UE, no obvious injuries noted.

## 2022-09-11 NOTE — ED PROVIDER NOTE - MUSCULOSKELETAL, MLM
Spine and all extremities grossly appears normal, range of motion is not limited, no muscle or joint tenderness, b/l shoulders- normal exam,

## 2022-09-11 NOTE — ED PROVIDER NOTE - CARE PROVIDER_API CALL
Jayson Mccain)  Orthopaedic Surgery Surgery  [address] [place] [address]  Phone: [phone]  Fax: [phone]  Follow Up Time:

## 2022-09-11 NOTE — ED PROVIDER NOTE - CPE EDP NEURO NORM
normal... Banner Transposition Flap Text: The defect edges were debeveled with a #15 scalpel blade.  Given the location of the defect and the proximity to free margins a Banner transposition flap was deemed most appropriate.  Using a sterile surgical marker, an appropriate flap drawn around the defect. The area thus outlined was incised deep to adipose tissue with a #15 scalpel blade.  The skin margins were undermined to an appropriate distance in all directions utilizing iris scissors.

## 2022-09-11 NOTE — ED ADULT TRIAGE NOTE - ARRIVAL FROM
History  Chief Complaint   Patient presents with    Cough     Pt has been feeling SOB since Sunday  Feels like he needs to cough something up but nothing comes up  Stated that he does occassionally cough mucus up and its clear  Denies sore throat  States that he feels SOB  Stated that drinking water helps him feel better  79-year-old male with no significant past medical history presents to the emergency department for evaluation of shortness of breath  The patient states that for the past several days he has had a cough  He reports that it is occasionally productive of clear sputum  Denies any alleviating or exacerbating factors of his cough or shortness of breath  The patient has not been vaccinated against COVID-19  Denies known contact with anyone positive for COVID-19  He has not taken anything to treat his symptoms prior to coming to the emergency department and has not seen any other providers  He denies chest pain, lower leg pain/swelling, fevers, chills, nausea, vomiting, diarrhea, loss of taste/smell sensation, sick contacts and recent travel  None         No significant past medical history    No significant past surgical history    No pertinent family history    I have reviewed and agree with the history as documented  E-Cigarette/Vaping    E-Cigarette Use Never User      E-Cigarette/Vaping Substances     Social History     Tobacco Use    Smoking status: Never Smoker    Smokeless tobacco: Never Used   Vaping Use    Vaping Use: Never used   Substance Use Topics    Alcohol use: Yes     Comment: social    Drug use: Yes     Types: Marijuana        Review of Systems   Constitutional: Negative for chills and fever  HENT: Negative for ear pain and sore throat  Eyes: Negative for pain and visual disturbance  Respiratory: Positive for cough and shortness of breath  Cardiovascular: Negative for chest pain and palpitations     Gastrointestinal: Negative for abdominal pain Home and vomiting  Genitourinary: Negative for dysuria and hematuria  Musculoskeletal: Negative for arthralgias and back pain  Skin: Negative for color change and rash  Neurological: Negative for seizures and syncope  All other systems reviewed and are negative  Physical Exam  ED Triage Vitals   Temperature Pulse Respirations Blood Pressure SpO2   07/28/21 2303 07/28/21 2303 07/28/21 2303 07/28/21 2303 07/28/21 2303   98 3 °F (36 8 °C) 98 16 141/96 96 %      Temp src Heart Rate Source Patient Position - Orthostatic VS BP Location FiO2 (%)   -- 07/29/21 0038 -- -- --    Monitor         Pain Score       --                    Orthostatic Vital Signs  Vitals:    07/28/21 2303 07/29/21 0038   BP: 141/96    Pulse: 98 92       Physical Exam  Vitals and nursing note reviewed  Constitutional:       Appearance: He is well-developed  HENT:      Head: Normocephalic and atraumatic  Mouth/Throat:      Mouth: Mucous membranes are moist       Pharynx: Oropharynx is clear  Uvula midline  Eyes:      Conjunctiva/sclera: Conjunctivae normal    Cardiovascular:      Rate and Rhythm: Normal rate and regular rhythm  Heart sounds: No murmur heard  Pulmonary:      Effort: Pulmonary effort is normal  No respiratory distress  Breath sounds: Normal breath sounds  Abdominal:      Palpations: Abdomen is soft  Tenderness: There is no abdominal tenderness  Musculoskeletal:      Cervical back: Neck supple  Skin:     General: Skin is warm and dry  Neurological:      Mental Status: He is alert           ED Medications  Medications - No data to display    Diagnostic Studies  Results Reviewed     Procedure Component Value Units Date/Time    Novel Coronavirus (Covid-19),PCR [facility] - 24 Hour Routine [[id number]]  (Abnormal) Collected: 07/29/21 0237    Lab Status: Final result Specimen: Nares from Nose Updated: 07/29/21 0330     SARS-CoV-2 Positive    Narrative:                        XR chest 2 views   ED Interpretation by Jannell Apley, MD (07/29 0518)   No acute cardiopulmonary process  Final Result by Zenia Cabezas MD (07/29 1029)      No acute cardiopulmonary disease  Workstation performed: [id number]               Procedures  ECG 12 Lead Documentation Only    Date/Time: 7/29/2021 1:00 AM  Performed by: Jannell Apley, MD  Authorized by: Jannell Apley, MD     ECG reviewed by me, the ED Provider: yes    Patient location:  ED  Previous ECG:     Previous ECG:  Unavailable    Comparison to cardiac monitor: Yes    Interpretation:     Interpretation: normal    Rate:     ECG rate assessment: normal    Rhythm:     Rhythm: sinus rhythm    Ectopy:     Ectopy: none    QRS:     QRS axis:  Normal  Conduction:     Conduction: normal    ST segments:     ST segments:  Normal  T waves:     T waves: normal            ED Course                     PERC Rule for PE      Most Recent Value   PERC Rule for PE   Age >=50  0 Filed at: 07/29/2021 0025   HR >=100  0 Filed at: 07/29/2021 0025   O2 Sat on room air < 95%  0 Filed at: 07/29/2021 0025   History of PE or DVT  0 Filed at: 07/29/2021 0025   Recent trauma or surgery  0 Filed at: 07/29/2021 0025   Hemoptysis  0 Filed at: 07/29/2021 0025   Exogenous estrogen  0 Filed at: 07/29/2021 0025   Unilateral leg swelling  0 Filed at: 07/29/2021 0025   PERC Rule for PE Results  0 Filed at: 07/29/2021 0025                        MDM  Number of Diagnoses or Management Options  Viral URI with cough  Diagnosis management comments: 26-year-old male presented to the emergency department for evaluation of cough and shortness of breath  On arrival the patient was awake, alert, oriented and in no acute distress  Initial vital signs stable  Physical exam was unremarkable including a benign cardiopulmonary examination  The patient was in no respiratory distress  Was able to speak in complete sentences  Ambulatory pulse ox within normal limits    Chest x-ray with no acute cardiopulmonary process  Nonischemic EKG  COVID-19 testing was sent  The patient is appropriate for discharge at this time with recommendation to establish care with an follow-up with a PCP  Isolation and return precautions were discussed  Patient agrees with the plan for discharge and feels comfortable to go home with proper f/u  Advised to return for worsening or additional problems  Diagnostic tests were reviewed and questions answered  Diagnosis, care plan and treatment options were discussed  The patient understands instructions and will follow up as directed  Disposition  Final diagnoses:   Viral URI with cough     Time reflects when diagnosis was documented in both MDM as applicable and the Disposition within this note     Time User Action Codes Description Comment    7/29/2021  2:13 AM Errol Lynn Add [J06 9] Viral URI with cough       ED Disposition     ED Disposition Condition Date/Time Comment    Discharge Stable u Jul 29, 2021  2:13 AM Darshana Umanzor discharge to home/self care  Follow-up Information     Follow up With Specialties Details Why Contact Info Additional Information    Infol[employer]  Call   [facility] Internal Medicine Schedule an appointment as soon as possible for a visit   [address] [facility] [address] [facility] [address]  [facility] [address], [address] [address], [place], [place], [address]   [facility] Emergency Department Emergency Medicine Go to  If symptoms worsen [address]  [address] [facility] [address] [facility] Emergency Department, [address] [place], [place], [address] [phone]          There are no discharge medications for this patient  No discharge procedures on file      PDMP Review     None           ED Provider  Attending physically available and evaluated Salma Fontaine I managed the patient along with the ED Attending      Electronically Signed by         Ortega Simental MD  07/29/21 4141

## 2022-09-11 NOTE — ED PROVIDER NOTE - OBJECTIVE STATEMENT
55 yo male in the Er with chronic b/l shoulders pain. Pt denies any injury to his shoulders, denies weakness, numbness or pain to his b/l arms.   pt mentioned that stopped working out and his shoulders are painful now. No known h/o arthritis, no other joints pain.

## 2022-09-13 DIAGNOSIS — Z88.1 ALLERGY STATUS TO OTHER ANTIBIOTIC AGENTS STATUS: ICD-10-CM

## 2022-09-13 DIAGNOSIS — M25.511 PAIN IN RIGHT SHOULDER: ICD-10-CM

## 2022-09-13 DIAGNOSIS — Z88.0 ALLERGY STATUS TO PENICILLIN: ICD-10-CM

## 2022-09-13 DIAGNOSIS — Z88.2 ALLERGY STATUS TO SULFONAMIDES: ICD-10-CM

## 2022-09-13 DIAGNOSIS — G89.29 OTHER CHRONIC PAIN: ICD-10-CM

## 2022-09-13 DIAGNOSIS — M25.512 PAIN IN LEFT SHOULDER: ICD-10-CM

## 2022-10-01 ENCOUNTER — EMERGENCY (EMERGENCY)
Facility: HOSPITAL | Age: 57
LOS: 1 days | Discharge: HOME | End: 2022-10-01
Admitting: EMERGENCY MEDICINE
Payer: MEDICAID

## 2022-10-01 VITALS
OXYGEN SATURATION: 96 % | HEART RATE: 79 BPM | WEIGHT: 199.96 LBS | HEIGHT: 68 IN | RESPIRATION RATE: 18 BRPM | SYSTOLIC BLOOD PRESSURE: 135 MMHG | TEMPERATURE: 98 F | DIASTOLIC BLOOD PRESSURE: 89 MMHG

## 2022-10-01 DIAGNOSIS — S22.39XA FRACTURE OF ONE RIB, UNSPECIFIED SIDE, INITIAL ENCOUNTER FOR CLOSED FRACTURE: Chronic | ICD-10-CM

## 2022-10-01 PROCEDURE — 99284 EMERGENCY DEPT VISIT MOD MDM: CPT

## 2022-10-02 PROCEDURE — 99283 EMERGENCY DEPT VISIT LOW MDM: CPT

## 2022-10-02 RX ORDER — CYCLOBENZAPRINE HYDROCHLORIDE 10 MG/1
1 TABLET, FILM COATED ORAL
Qty: 9 | Refills: 0
Start: 2022-10-02 | End: 2022-10-04

## 2022-10-02 RX ORDER — LIDOCAINE 4 G/100G
1 CREAM TOPICAL ONCE
Refills: 0 | Status: COMPLETED | OUTPATIENT
Start: 2022-10-02 | End: 2022-10-02

## 2022-10-02 RX ORDER — IBUPROFEN 200 MG
1 TABLET ORAL
Qty: 20 | Refills: 0
Start: 2022-10-02 | End: 2022-10-06

## 2022-10-02 RX ORDER — IBUPROFEN 200 MG
600 TABLET ORAL ONCE
Refills: 0 | Status: COMPLETED | OUTPATIENT
Start: 2022-10-02 | End: 2022-10-02

## 2022-10-02 RX ADMIN — Medication 600 MILLIGRAM(S): at 00:50

## 2022-10-02 RX ADMIN — Medication 600 MILLIGRAM(S): at 00:48

## 2022-10-02 RX ADMIN — LIDOCAINE 1 PATCH: 4 CREAM TOPICAL at 00:49

## 2022-10-02 NOTE — ED PROVIDER NOTE - OBJECTIVE STATEMENT
55 y/o male w/ hx thyroid dz p/w intermittent R lower back pain x few weeks.  Also notes occasionally feels neck pain, but currently without neck pain.  States was talking ibuprofen and muscle relaxant but ran out today.  Denies known trauma/injury.  Denies f/c, abd pain, n/v/d, changes in urination/ bowel habits, saddle anesthesia, numbness/tingling/weakness to ext.

## 2022-10-02 NOTE — ED PROVIDER NOTE - NSCAREINITIATED _GEN_ER
Hellerman, Samantha(PA) Headache for 4-5 weeks, had CT and MRI all negative not relieved by any prescribed  medicine.  Has a neurologist Dr España, Dr España's partner sent patient to ED for spinal tap.

## 2022-10-02 NOTE — ED PROVIDER NOTE - PATIENT PORTAL LINK FT
You can access the FollowMyHealth Patient Portal offered by [facility] by registering at the following website: [email or web address]. By joining [employer]’s FollowMyHealth portal, you will also be able to view your health information using other applications (apps) compatible with our system.

## 2022-10-02 NOTE — ED PROVIDER NOTE - PHYSICAL EXAMINATION
CONSTITUTIONAL: Awake, alert.  Nontoxic, no acute distress.    HEAD: Normocephalic, atraumatic.    EYES: Conjunctivae clear without exudates or hemorrhage. Sclera is non-icteric.    ENT: Normal appearing external ears, nose, mucous membranes moist.    NECK: supple, trachea midline.    HEART:  Normal rate, regular rhythm.  Heart sounds S1, S2.  No murmurs, rubs or gallops.    LUNGS:  No acute respiratory distress.  Non-tachypneic and non-labored.  Lungs are clear bilaterally with good aeration.  No wheezing, rales, rhonchi.    ABDOMEN: soft, ntnd.  no cva ttp    BACK: no obvious deformity, no bony stepoffs, no midline or paraspinal ttp.  neg straight leg raise    MUSCULOSKELETAL:  Moving all extremities without issue.  5/5 strength.  SILT b/l lower ext.  ambulating with steady gait    SKIN: Skin in warm, dry and intact without rashes or lesions.  Appropriate color for ethnicity.    NEUROLOGICAL:  Patient is alert, oriented x person, place and time.    PSYCH: Appropriate mood and affect. Good judgment and insight.

## 2022-10-02 NOTE — ED PROVIDER NOTE - CLINICAL SUMMARY MEDICAL DECISION MAKING FREE TEXT BOX
55 y/o male w/ hx thyroid dz p/w intermittent R lower back pain x few weeks, no known trauma/injury  No red flags to suggest acute cord compression/ cauda equina  No midline ttp, nvi  Suspect msk pain  Will give pain meds, dc with pmd f/u   Strict return precautions provided

## 2022-10-02 NOTE — ED PROVIDER NOTE - NS ED ROS FT
CONSTITUTIONAL: Denies fever and chills    RESPIRATORY: Denies SOB and cough.    GASTROINTESTINAL: Denies abdominal pain, nausea, vomiting and diarrhea.    GENITOURINARY: Denies dysuria and hematuria.    MUSCULOSKELETAL: R lower back pain    SKIN: Denies rash and pruritus.    NEUROLOGICAL: Denies headache and syncope

## 2022-10-02 NOTE — ED PROVIDER NOTE - NSFOLLOWUPINSTRUCTIONS_ED_ALL_ED_FT
Thank you for visiting [facility] Emergency Department.      We saw you today for back pain.    PAIN CONTROL:   You may take ibuprofen (Motrin, Advil) 600 mg (3 regular tablets) every 6 hours as needed for pain.  Please take with food.  Stop taking if you develop abdominal pain, dark/ bloody stools.  Do not mix with other NSAIDS (ie. Naproxen, Aleve, Celecoxib).  You may also take acetaminophen (Tylenol) 650-975mg (2-3 regular tablets) or 500-1000mg (1-2 extra strength tablets) every 6 hours as needed for pain.  Do not exceed 4000 mg in 1 day. These medications may be bought over the counter.    I recommend alternating the Ibuprofen and Tylenol so you are getting medications around the clock.  For example take the Ibuprofen, then 3 hours later take the Tylenol, then 3 hours later take the Ibuprofen, and repeat as needed.    You may take Flexeril as prescribed and only as needed for severe pain.  Do not drive or operate machinery while taking this medication.  This medication may make you feel sleepy and has the potential to be habit-forming or addictive.    Rest. Apply heat to affected area 20 minutes on, then 20 minutes off.  You may repeat throughout the day.  Elevate affected extremity.    Please know that no emergency visit is complete without follow-up with your primary care provider in 1 week.  Please bring copies of all discharge papers and results and show to your doctor.      Please continue taking all previous medications as instructed unless we discussed otherwise.     I appreciated your patience and hope you feel better soon.     Return to ER immediately if you develop fevers, chills, chest pain, shortness of breath, worsening pain, and/or any concerning symptoms.

## 2022-10-03 DIAGNOSIS — Z88.1 ALLERGY STATUS TO OTHER ANTIBIOTIC AGENTS STATUS: ICD-10-CM

## 2022-10-03 DIAGNOSIS — E03.9 HYPOTHYROIDISM, UNSPECIFIED: ICD-10-CM
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
## 2022-10-03 DIAGNOSIS — M54.2 CERVICALGIA: ICD-10-CM

## 2022-10-03 DIAGNOSIS — Z91.138 PATIENT'S UNINTENTIONAL UNDERDOSING OF MEDICATION REGIMEN FOR OTHER REASON: ICD-10-CM

## 2022-10-03 DIAGNOSIS — M54.50 LOW BACK PAIN, UNSPECIFIED: ICD-10-CM

## 2022-10-03 DIAGNOSIS — T48.1X6A: ICD-10-CM

## 2022-10-03 DIAGNOSIS — Z88.0 ALLERGY STATUS TO PENICILLIN: ICD-10-CM

## 2022-10-03 DIAGNOSIS — Z87.81 PERSONAL HISTORY OF (HEALED) TRAUMATIC FRACTURE: ICD-10-CM

## 2022-10-03 DIAGNOSIS — T39.316A UNDERDOSING OF PROPIONIC ACID DERIVATIVES, INITIAL ENCOUNTER: ICD-10-CM
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
## 2022-10-03 DIAGNOSIS — F19.10 OTHER PSYCHOACTIVE SUBSTANCE ABUSE, UNCOMPLICATED: ICD-10-CM

## 2022-10-03 DIAGNOSIS — Z87.2 PERSONAL HISTORY OF DISEASES OF THE SKIN AND SUBCUTANEOUS TISSUE: ICD-10-CM

## 2022-11-04 ENCOUNTER — APPOINTMENT (OUTPATIENT)
Dept: PHYSICAL MEDICINE AND REHAB | Facility: CLINIC | Age: 57
End: 2022-11-04

## 2022-11-04 VITALS
SYSTOLIC BLOOD PRESSURE: 143 MMHG | DIASTOLIC BLOOD PRESSURE: 92 MMHG | HEIGHT: 68 IN | WEIGHT: 200 LBS | BODY MASS INDEX: 30.31 KG/M2 | HEART RATE: 77 BPM | OXYGEN SATURATION: 95 %

## 2022-11-04 PROCEDURE — 99205 OFFICE O/P NEW HI 60 MIN: CPT

## 2022-11-16 NOTE — PHYSICAL EXAM
[FreeTextEntry1] : RIGHT SHOULDER:\par neg effusion\par neg scars or lacerations or lesions\par neg increased warmth\par neg scapular winging\par neg muscle atrophy\par \par Palpation:\par no TTP over sterna-clavicular joint\par no TTP over clavicle\par no TTP over coracoid process\par no TTP over sternum\par +TTP over AC joint\par +TTP over humerus\par no TTP over the spine of the scapula\par no TTP over the medial border of the scapula, superior angle, inferior angle, lateral border\par +TTP over supraspinatus\par no TTP over infraspinatusrc\par no TTP over upper trapezius\par +TTP over deltoid muscle\par no TTP in the axilla\par neg crepitus with PROM shoulder\par \par AROM:\par active range of motion limited with pain at terminal ROM\par \par Abduction 160/170-180\par Forward Flexion 160/160-180\par Elevation through the plane of the scapula 160/170-180\par External Rotation 60/80-90\par Internal Rotation 50/\par Extension 10/50-60\par Adduction 30/50-75\par \par PROM consistent with above, painless\par \par Manual Muscle Testing\par 4/5 to resisted isometric testing in all planes\par \par neg sulcus sign\par +Neer test\par +Morel test\par +Hoa’s test\par + Speed’s test\par neg Yergason’s test\par neg drop arm test\par +empty can test\par neg external rotation lag sign\par neg lift off sign\par neg hornblower’s sign\par +Horizontal adduction test\par \par Sensation to light touch intact over all dermatomes about the shoulder\par Distal pulses present\par \par LEFT SHOULDER:\par neg effusion\par neg scars or lacerations or lesions\par neg increased warmth\par neg scapular winging\par neg muscle atrophy\par \par Palpation:\par no TTP over sterna-clavicular joint\par no TTP over clavicle\par no TTP over coracoid process\par no TTP over sternum\par no TTP over AC joint\par no TTP over humerus\par no TTP over the spine of the scapula\par no TTP over the medial border of the scapula, superior angle, inferior angle, lateral border\par no TTP over supraspinatus\par no TTP over infraspinatus\par no TTP over upper trapezius\par no TTP over deltoid muscle\par no TTP in the axilla\par neg crepitus with PROM shoulder\par \par AROM:\par active range of motion full and painless, within functional limits\par smooth, appropriate scapulohumeral rhythm\par PROM consistent with above, painless\par \par Manual Muscle Testing\par 5/5 to resisted isometric testing in all planes\par \par neg sulcus sign\par neg Neer test\par neg Morel test\par neg Hoa’s test\par neg Speed’s test\par neg Yergason’s test\par neg drop arm test\par neg empty can test\par neg external rotation lag sign\par neg lift off sign\par neg hornblower’s sign\par neg Horizontal adduction test\par \par Sensation to light touch intact over all dermatomes about the shoulder\par Distal pulses present\par
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
## 2022-11-16 NOTE — ASSESSMENT
[FreeTextEntry1] :                                                       Assessment/Plan:\par \par ROSEMARIE ADLER is a 57 year male with left shoulder pain here for initial consultation.\par \par Internal impingement syndrome of the shoulder, left\par Subacromial bursitis, left\par Decreased range of motion of the shoulder (external lacking 10, internal lacking 10)\par Weakness of the shoulder, left\par \par - Tiers of treatment and management of above diagnosis(es) were discussed with patient\par - Optimal diet, weight, sleep, and lifestyle management to minimize stress and maximize well being counseling provided\par - Imaging reviewed and discussed with patient\par - Patient was advised to start a structured, targeted therapy program 2-3x/wk for 8 wks with goal toward HEP\par - Patient was educated on an appropriate home exercise program, provided with exercise recommendations, all questions answered\par - Patient may trial topical diclofenac 1% gel QID to the site of their worst pain for the next 7 to 10 days to help with pain and relieve inflammation. Afterwards, they were advised to use only as needed.\par - Patient was advised to apply cool compresses or warm heat to affected regions PRN\par - Radiographs of b/l shoulders ordered today \par \par - Educated about red flag symptoms including (but not limited to) new, worsened, or persistent: fever greater than 100F, bowel or bladder incontinence, bowel obstipation, inability to void urine, urinary leakage, Severe nausea or vomiting, Worsening numbness, worsening tingling/paresthesias, and/or new or progressive motor weakness; advised to seek immediate medical attention at his nearest Emergency department should they experience any of the above\par \par - Follow up in 2-3 weeks after imaging, in person in 2 months to assess their progress\par \par I have personally spent a total of at least 65 minutes preparing, reviewing internal and external records, explaining, counseling, and coordinating care for this patient encounter.\par \par Thank you, Dr, for allowing me to participate in the care of your patient. Please do not hesitate to contact me with questions/concerns.\par \par Kulwant Andre M.D.\par Sports and Interventional Spine\par Department of Physical Medicine and Rehabilitation \par [facility] \par Email: [email or web address]\par \par [facility]\par [facility] [place] [facility]\par [address]\par [facility], 11th Floor\[place] [address]\par \par Appointments: [phone]\par Fax: [phone]\par

## 2022-12-08 ENCOUNTER — OUTPATIENT (OUTPATIENT)
Dept: OUTPATIENT SERVICES | Facility: HOSPITAL | Age: 57
LOS: 1 days | End: 2022-12-08
Payer: MEDICAID

## 2022-12-08 ENCOUNTER — RESULT REVIEW (OUTPATIENT)
Age: 57
End: 2022-12-08

## 2022-12-08 DIAGNOSIS — S22.39XA FRACTURE OF ONE RIB, UNSPECIFIED SIDE, INITIAL ENCOUNTER FOR CLOSED FRACTURE: Chronic | ICD-10-CM

## 2022-12-08 PROCEDURE — 73030 X-RAY EXAM OF SHOULDER: CPT

## 2022-12-08 PROCEDURE — 73030 X-RAY EXAM OF SHOULDER: CPT | Mod: 26,LT,RT

## 2022-12-08 NOTE — ED PROVIDER NOTE - NS ED MD DISPO DISCHARGE CCDA
L TKA 12/27/22 Dr. Carlson.  Lives at home with his spouse who will help him after surgery. 3 steps to enter ranch style home. Needs 2ww, cane. Would like us to dispense 2ww, order placed Physical will be scheduled with Maverick Yates MD.  Orders placed for prehab and MRSA swab to be done between 12/8 to 12/13. Joint registry completed. Joint academy completed in person 12/8/22. Role of care coordinator explained.     Med reconciliation completed.    Allergy referral (pcn, cephalosporin, aspirin, metal) entered: no    Patient was given kit which includes the following items:  Toothbrush  Toothpaste  CHG wipes/Liquid soap (CHG)    Instructions:  The new toothbrush is to be used the day before the surgery and the morning of, along with the toothpaste supplied. This is to help reduce the risk of infection.  Patient was instructed to use soap per surgical physician protocol.      Hospital readmission risks:   BMI >30      Patient would like outpatient therapy at [facility], order placed.      Patient received meloxicam.           
Patient/Caregiver provided printed discharge information.

## 2023-01-26 NOTE — ED ADULT TRIAGE NOTE - CADM TRG TX PRIOR TO ARRIVAL
none
CONSTITUTIONAL:  Well appearing, awake, alert, oriented to person, place, time/situation and in no apparent distress.  Pt. is objectively comfortable appearing and verbalizing in full, clear, effortless sentences.  ENMT: NC/AT.  Airway patent.  Nasal mucosa clear.  Moist mucous membranes.  Neck supple.  EYES:  Clear OU.  CARDIAC:  Normal rate, regular rhythm.  Heart sounds S1 S2.  No murmurs, gallops, or rubs.  RESPIRATORY:  Breath sounds clear and equal bilaterally.  No wheezes, no rales, no rhonchi.  GASTROINTESTINAL:  Abdomen soft, non-distended, non-tender.  No rebound, no guarding.  NEUROLOGICAL:  Alert and oriented to person/place/time/situation.  No focal deficits; no tremors noted.   MUSCULOSKELETAL:  Range of motion is not limited.    SKIN:  Skin color unremarkable.  Skin warm, dry, and intact.    PSYCHIATRIC:  Alert and oriented to person/place/time/situation.  Mood and affect WNL.  No apparent risk to self or others.

## 2023-02-04 NOTE — ED ADULT NURSE NOTE - NS ED NOTE  TALK SOMEONE YN
Patient: Olga Lidia Bell   : 1951 MRN: [id number]    SUBJECTIVE:  Chief Complaint   Patient presents with   • Medicare Wellness Visit       A 71 year old female presents for Medicare wellness visit.      Patient has given consent to record this visit for documentation in their clinical record.    HISTORY OF PRESENT ILLNESS:  Historian: Self.  1. Medicare annual wellness visit, subsequent  ANNUAL WELLNESS VISIT SUBSEQUENT VISIT W PPS:  Diet and Exercise:  Weight is stable for her age.  Body mass index is 29.57 kg/mÂ².  Patient BMI is within normal range.   Depression screening: Negative  Opioids: Not taking Opioids.  Cognition:  Memory: No memory concerns.  Hearing/Vision:  Hearing: No hearing problems.  Vision:  Mentions she visited ophthalmologist on Monday.  Has developing cataracts.  Screening labs:  Urinalysis is normal, Microalbumin is normal, Thyroid function is okay. TSH:4.5 (in borderline)  Dermatology screening:  Scheduled an appointment with dermatology on 2023 to get checked for the skin lesion on upper chest which is itchy occasionally.    2. Encounter for preventive care:  PREV EST AGE >64  3. Screening for colon cancer  Due for OPEN ACCESS COLONOSCOPY      Additional comments      Essential hypertension:  Is on Losartan-hydrochlorothiazide (HYZAAR) 100-12.5 MG per tablet.    Hyperlipidemia:  Is on simvastatin (ZOCOR) 20 MG tablet. Cholesterol: 206 mg/dl, Triglycerides are higher, (HDL)good Cholesterol are lower, and LDL was slight better.    Stage 3a chronic kidney disease (CMS/HCC):  Has a history of Kidney disease. Creatinine:1.12 mg/dl(slight high), BUN is normal. GFR was 52. Is on Aleve/ibuprofen once in a while.  Knee Problems:  States she has issues with knees and is bothered, but manageable. Her MRI showed medial meniscus tear. Mentions she is not willing to do meniscus repair at the moment.     Arthritis:  Has a h/o mild arthritis. Feels arthritis in fingers. Inquires about  Qunol/Turmeric.  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   • losartan-hydrochlorothiazide (HYZAAR) 100-12.5 MG per tablet Take 1 tablet by mouth daily.   • econazole (SPECTAZOLE) 1 % cream Apply topically once a day   • diclofenac (VOLTAREN) 1 % gel Apply 4 g topically 4 times daily.   • ketoconazole (NIZORAL) 2 % cream APPLY TOPICALLY TO AFFECTED AREA(S) ON BOTH FEET TWICE DAILY FOR AT LEAST 6 WEEKS   • triamcinolone (ARISTOCORT) 0.1 % ointment Apply topically 2 times daily as needed (rash). Apply to affected areas BID as needed.   • calcium carbonate-vitamin D (CALTRATE+D) 600-400 MG-UNIT per tablet Take 1 tablet by mouth daily.   • OMEGA-3 1000 MG CAPS Take  by mouth.     No current facility-administered medications for this visit.     ALLERGIES:  ALLERGIES:   Allergen Reactions   • Ace Inhibitors Cough     PAST SURGICAL HISTORY:  Past Surgical History:   Procedure Laterality Date   • Biopsy of breast, incisional  02/01/2002    right breast, benign   • Biopsy of breast, incisional  10/28/1999    left breast, fibrocystic change, category 3--probable benign   • Colonoscopy diagnostic  4/6/2011    Normal Screen, diverticulosis,10 year colon recall per dr monroy   • Laparoscopy,tubal cautery  01/01/1982   • Mastectomy partial  11/1/2012    [id number] L breast    • Screening mammography  10/22/2007    bilateral, results: category 2--benign     FAMILY HISTORY:  Family History   Problem Relation Age of Onset   •  Cancer Mother         esophageal   • Diabetes Father    • Hypertension Father    • Heart Father         ashd   • Heart Brother 50        ASHD   • Allergic Rhinitis Son    • Cancer Maternal Grandmother         unspecified     SOCIAL HISTORY:  Social History     Tobacco Use   Smoking Status Never   Smokeless Tobacco Never     Social History     Substance and Sexual Activity   Alcohol Use Yes    Comment: social        REVIEW OF SYSTEMS:  Musculoskeletal: As per HPI.  Genitourinary: As per HPI   Skin: As per HPI   OBJECTIVE:  Vitals:    02/03/23 0927 02/03/23 1000   BP: (!) 140/87 110/76   BP Location: RUE - Right upper extremity    Patient Position: Sitting    Cuff Size: Regular    Pulse: (!) 110    SpO2: 97%    Weight: 83.1 kg (183 lb 3.2 oz)    Height: 5' 6\"      Body mass index is 29.57 kg/m².      PHYSICAL EXAM:  Constitutional: Alert, in no acute distress and current vital signs reviewed.   Head and Face: Atraumatic, no deformities, normocephalic, normal facies.  Eyes: No discharge, normal conjunctiva, no eyelid swelling, no ptosis and the sclerae were normal. Pupils equal, round and reactive to light and accommodation, conjugate gaze and extraocular movements were intact.   ENT: Normal appearing outer ear, normal appearing nose. Examination of the tympanic membrane showed normal landmarks, Normal appearing external canal. Nasal mucosa moist and pink, no nasal discharge. Normal lips. Oral mucosa pink and moist, no oral lesions.   Neck: Normal appearing neck, supple neck and no mass was seen. Thyroid not enlarged and no thyroid nodules. Lymphatic: No lymphadenopathy. Pulmonary: No respiratory distress, normal respiratory rate and effort and no accessory muscle use. Breath sounds clear to auscultation bilaterally. Cardiovascular: Normal rate, no murmurs were heard, regular rhythm, normal S1 and normal S2. Edema was not present in the lower extremities.   Abdomen: Soft, nontender, nondistended, normal bowel sounds  and no abdominal mass. No hepatomegaly and no splenomegaly. No umbilical hernia was discovered. Musculoskeletal: normal gait. No musculoskeletal erythema was seen, no joint swelling seen and no joint tenderness was elicited. No scoliosis. Normal range of motion. There was no joint instability noted. Muscle strength and tone were normal.   Neurologic: Cranial nerves grossly intact. Normal DTRs. No sensory deficits noted. No coordination deficits. Normal gait. Muscle strength and tone were normal.   Psychiatric: Oriented to person, oriented to place and oriented to time. Alert and awake, interactive and mood/affect were appropriate. Judgment not impaired. Normal attention span. Short term memory intact.   Skin: Noted a skin lesion in the upper chest area a bit left to the midline which is a centimeter in size, red, and hard nodule.  Hair, Nails: No foot ulcers and no skin ulcer was seen. Normal skin turgor. No clubbing or cyanosis of the fingernails.   Breasts: Negative findings: normal and symmetry, normal contour with no evidence of flattening or dimpling, nipples everted without rashes or discharge, palpation negative for masses or nodules or adenopathy.  ASSESSMENT AND PLAN:  This is a 71 year old female who presents with :  1. Medicare annual wellness visit, subsequent  ANNUAL WELLNESS VISIT SUBSEQUENT VISIT W PPS  Reviewed and discussed previous lab reports  Reviewed and reconciled current medication.  Follow up with dermatology as planned.  2. Encounter for preventive care:  Ordered PREV EST AGE >64  Screening lab was reviewed with the patient.  CBC was completely within normal limits.  Comprehensive metabolic panel with slight elevation creatinine 1.12 with a BUN of 16.  Glucose minimally elevated of 105.  Lipid profile with total cholesterol 206 elevated triglyceride levels are 260 HDL 47 .  Patient encouraged to cut back on total calorie intake  3. Screening for colon cancer  Ordered OPEN ACCESS  COLONOSCOPY.  Additional plan:  Essential hypertension:   Well-Controlled  Rechecked blood pressure by the physician in the office measures 110/76 mmHg  Maintain on losartan hydrochlorothiazide 100/25 daily  Hyperlipidemia:  Currently, uncontrolled.  Reviewed and discussed the previous lab reports.   Advised avoiding high calorie foods.  Considered switching to Crestor(rosuvastatin) with the next visit.  Explained Crestor(rosuvastatin) works better than simvastatin.   Advised healthy lifestyle.     Stage 3a chronic kidney disease (CMS/HCC):   Currently, improved.  Patient's creatinine is typically at slightly elevated with normal BUN  Reviewed and discussed the previous lab reports.   Avoid nephrotoxic agents.  Avoid continuous intake of Aleve/ibuprofen.     Arthritis:   Explained Turmeric helps to reduce the pains and aches.      Follow up: 6 months or sooner as needed.          Orders Placed This Encounter   •  - Subsequent Medicare Wellness Visit - Select if billing add'l E/M   • [address] - 65+ Follow up preventive, established patient   • OPEN ACCESS COLONOSCOPY   • simvastatin (ZOCOR) 20 MG tablet   • losartan-hydrochlorothiazide (HYZAAR) 100-12.5 MG per tablet         Refer to orders.  Medical compliance with plan discussed and risks of non-compliance reviewed.  Patient education completed on disease process, etiology & prognosis.  Proper usage and side effects of medications reviewed & discussed.  Patient understands and agrees with the plan.  Return to clinic as clinically indicated as discussed with patient who verbalized understanding of the plan and is in agreement with the plan.    Return in about 1 year (around 2/3/2024) for Medicare Wellness Visit.    I,  Dr. Lindy Mack, have created a visit summary document based on the audio recording between Dr. Ubaldo Newberry MD and this patient for the physician to review, edit as needed, and authenticate.  Creation Date: 2/4/2023   I have reviewed and  edited the visit summary above and attest that it is accurate.       No

## 2023-02-10 ENCOUNTER — APPOINTMENT (OUTPATIENT)
Dept: PHYSICAL MEDICINE AND REHAB | Facility: CLINIC | Age: 58
End: 2023-02-10

## 2023-02-13 ENCOUNTER — APPOINTMENT (OUTPATIENT)
Dept: PHYSICAL MEDICINE AND REHAB | Facility: CLINIC | Age: 58
End: 2023-02-13
Payer: MEDICAID

## 2023-02-13 VITALS
SYSTOLIC BLOOD PRESSURE: 131 MMHG | HEART RATE: 88 BPM | BODY MASS INDEX: 30.31 KG/M2 | OXYGEN SATURATION: 95 % | WEIGHT: 200 LBS | HEIGHT: 68 IN | DIASTOLIC BLOOD PRESSURE: 91 MMHG

## 2023-02-13 DIAGNOSIS — M19.011 PRIMARY OSTEOARTHRITIS, RIGHT SHOULDER: ICD-10-CM

## 2023-02-13 DIAGNOSIS — M25.311 OTHER INSTABILITY, RIGHT SHOULDER: ICD-10-CM

## 2023-02-13 DIAGNOSIS — R29.898 OTHER SYMPTOMS AND SIGNS INVOLVING THE MUSCULOSKELETAL SYSTEM: ICD-10-CM

## 2023-02-13 DIAGNOSIS — M75.41 IMPINGEMENT SYNDROME OF RIGHT SHOULDER: ICD-10-CM

## 2023-02-13 DIAGNOSIS — M75.51 BURSITIS OF RIGHT SHOULDER: ICD-10-CM

## 2023-02-13 DIAGNOSIS — M75.42 IMPINGEMENT SYNDROME OF RIGHT SHOULDER: ICD-10-CM

## 2023-02-13 DIAGNOSIS — S49.91XS UNSPECIFIED INJURY OF RIGHT SHOULDER AND UPPER ARM, SEQUELA: ICD-10-CM

## 2023-02-13 DIAGNOSIS — M25.611 STIFFNESS OF RIGHT SHOULDER, NOT ELSEWHERE CLASSIFIED: ICD-10-CM

## 2023-02-13 PROCEDURE — 99215 OFFICE O/P EST HI 40 MIN: CPT

## 2023-02-13 RX ORDER — DICLOFENAC SODIUM 1% 10 MG/G
1 GEL TOPICAL TWICE DAILY
Qty: 1 | Refills: 3 | Status: ACTIVE | COMMUNITY
Start: 2023-02-13 | End: 1900-01-01

## 2023-02-13 RX ORDER — NAPROXEN 500 MG/1
500 TABLET ORAL
Qty: 28 | Refills: 1 | Status: ACTIVE | COMMUNITY
Start: 2023-02-13 | End: 1900-01-01
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
## 2023-02-13 NOTE — ASSESSMENT
[FreeTextEntry1] :                                                       Assessment/Plan:\par \par ROSEMARIE ADLER is a 57 year male with left shoulder pain here for follow up \par \par Internal impingement syndrome of the shoulder, left\par Subacromial bursitis, left\par Decreased range of motion of the shoulder (external lacking 10, internal lacking 10)\par Weakness of the shoulder, left\par Chronic cervical radiculopathy, C6, B/L\par \par - Tiers of treatment and management of above diagnosis(es) were discussed with patient\par - Optimal diet, weight, sleep, and lifestyle management to minimize stress and maximize well being counseling provided\par - Imaging reviewed including interval radiographs of the B/L shoulders ( and discussed with patient\par - Patient was advised to resume their structured, targeted therapy program 2-3x/wk for 8 wks with goal toward HEP\par - Patient was educated on an appropriate home exercise program, provided with exercise recommendations, all questions answered\par - Patient may trial topical diclofenac 1% gel QID to the site of their worst pain for the next 7 to 10 days to help with pain and relieve inflammation. Afterwards, they were advised to use only as needed. Rx sent to [facility].\par - Confirmed therapy evaluation with [facility] for 2/17/23 at 4pm, info given to the patient\par - Provided with note for purposes of obtaining domicile\par - Patient was advised to apply cool compresses or warm heat to affected regions PRN\par \par - Educated about red flag symptoms including (but not limited to) new, worsened, or persistent: fever greater than 100F, bowel or bladder incontinence, bowel obstipation, inability to void urine, urinary leakage, Severe nausea or vomiting, Worsening numbness, worsening tingling/paresthesias, and/or new or progressive motor weakness; advised to seek immediate medical attention at his nearest Emergency department should they experience any of the above\par \par - Follow up in person in 2 months to assess their progress\par \par I have personally spent a total of at least 65 minutes preparing, reviewing internal and external records, explaining, counseling, and coordinating care for this patient encounter.\par \par Thank you, , for allowing me to participate in the care of your patient. Please do not hesitate to contact me with questions/concerns.\par \par Kulwant Andre M.D.\par Sports and Interventional Spine\par Department of Physical Medicine and Rehabilitation \par [facility] \par Email: [email or web address]\par \par [facility]\par [facility]\par [address]\par [facility], 11th Floor\[place] [address]\par \par Appointments: [phone]\par Fax: [phone]\par

## 2023-04-17 NOTE — ED ADULT NURSE NOTE - MODE OF DISCHARGE
Patient notified she needs to come in for recollection of STI panel, patient needs to complete a  Vaginal swab for testing.    Ambulatory

## 2023-05-09 NOTE — ED ADULT NURSE NOTE - NS ED NURSE DISCH DISPOSITION
Patient admitted to [facility] for severe hypokalemia and back pain. [facility] will watch for discharge to schedule further follow-up.    INR (no units)   Date Value   05/09/2023 1.7     Igor BlackD  
Discharged

## 2023-05-15 ENCOUNTER — APPOINTMENT (OUTPATIENT)
Dept: PHYSICAL MEDICINE AND REHAB | Facility: CLINIC | Age: 58
End: 2023-05-15

## 2023-09-15 ENCOUNTER — EMERGENCY (EMERGENCY)
Facility: HOSPITAL | Age: 58
LOS: 1 days | Discharge: HOME | End: 2023-09-15
Admitting: EMERGENCY MEDICINE
Payer: MEDICAID

## 2023-09-15 VITALS
OXYGEN SATURATION: 97 % | HEART RATE: 70 BPM | DIASTOLIC BLOOD PRESSURE: 107 MMHG | WEIGHT: 199.96 LBS | TEMPERATURE: 98 F | SYSTOLIC BLOOD PRESSURE: 155 MMHG | RESPIRATION RATE: 18 BRPM | HEIGHT: 68 IN

## 2023-09-15 DIAGNOSIS — S22.39XA FRACTURE OF ONE RIB, UNSPECIFIED SIDE, INITIAL ENCOUNTER FOR CLOSED FRACTURE: Chronic | ICD-10-CM

## 2023-09-15 DIAGNOSIS — Z76.0 ENCOUNTER FOR ISSUE OF REPEAT PRESCRIPTION: ICD-10-CM

## 2023-09-15 DIAGNOSIS — Z88.2 ALLERGY STATUS TO SULFONAMIDES: ICD-10-CM

## 2023-09-15 DIAGNOSIS — E03.9 HYPOTHYROIDISM, UNSPECIFIED: ICD-10-CM

## 2023-09-15 DIAGNOSIS — Z88.1 ALLERGY STATUS TO OTHER ANTIBIOTIC AGENTS STATUS: ICD-10-CM

## 2023-09-15 DIAGNOSIS — Z88.0 ALLERGY STATUS TO PENICILLIN: ICD-10-CM

## 2023-09-15 PROCEDURE — 99281 EMR DPT VST MAYX REQ PHY/QHP: CPT

## 2023-09-15 PROCEDURE — 99283 EMERGENCY DEPT VISIT LOW MDM: CPT | Mod: 25

## 2023-09-15 RX ORDER — LEVOTHYROXINE SODIUM 125 MCG
1 TABLET ORAL
Qty: 14 | Refills: 0
Start: 2023-09-15 | End: 2023-09-28

## 2023-09-15 RX ORDER — LEVOTHYROXINE SODIUM 125 MCG
200 TABLET ORAL ONCE
Refills: 0 | Status: COMPLETED | OUTPATIENT
Start: 2023-09-15 | End: 2023-09-15

## 2023-09-15 RX ORDER — LEVOTHYROXINE SODIUM 125 MCG
1 TABLET ORAL
Qty: 30 | Refills: 0
Start: 2023-09-15 | End: 2023-10-31

## 2023-09-15 RX ADMIN — Medication 200 MICROGRAM(S): at 08:54

## 2023-09-15 NOTE — ED PROVIDER NOTE - CLINICAL SUMMARY MEDICAL DECISION MAKING FREE TEXT BOX
57 M pmh hypothyroid presents requesting refill of synthroid after running out 2 days ago.  pt also wants PT referral for chronic R shoulder pain.  no acute complaints.  will dc w/ 2 week rx synthroid and information for pmd/ortho.  discussed strict return parameters

## 2023-09-15 NOTE — ED PROVIDER NOTE - PATIENT PORTAL LINK FT
You can access the FollowMyHealth Patient Portal offered by [facility] by registering at the following website: [email or web address]. By joining [employer]’s FollowMyHealth portal, you will also be able to view your health information using other applications (apps) compatible with our system.

## 2023-09-15 NOTE — ED PROVIDER NOTE - PHYSICAL EXAMINATION
[employer] Gen: well appearing, no acute distress  Skin: warm/dry, no rash noted  Resp: breathing comfortably, speaking in full sentences, no dyspnea  Neuro: alert/oriented, ambulatory

## 2023-09-15 NOTE — ED ADULT NURSE NOTE - OBJECTIVE STATEMENT
Pt arrived to ED c/o medication refill. Pt reports he ran out of levothyroxine. Pt reports he hasn't taken it in x2 days.

## 2023-09-15 NOTE — ED PROVIDER NOTE - OBJECTIVE STATEMENT
57 M pmh hypothyroid presents requesting refill of synthroid after running out 2 days ago.  states he was recently incarcerated and does not have any meds left.  also requesting PT consult for chronic R shoulder pain.  does not have PMD.  denies f/c, HA, dizziness, fainting, chest pain, sob, urinary sxs, trauma.

## 2023-09-15 NOTE — ED PROVIDER NOTE - NSFOLLOWUPCLINICS_GEN_ALL_ED_FT
[facility]  Family Medicine  [address], 2nd Floor  [place], [place] [address]  Phone: [phone]  Fax:

## 2023-09-15 NOTE — ED PROVIDER NOTE - NSFOLLOWUPINSTRUCTIONS_ED_ALL_ED_FT
Please continue your home medication as prescribed    You need to follow up with primary care doctor in order to continue getting refills    You may call our referrals coordinator at [phone] Monday to Friday 11am-7pm for assistance with making an appointment    [facility] (Thursday afternoons) - call [phone] to schedule for your chronic shoulder pain    Follow up with your primary care doctor or clinics listed below if you do not have a doctor,    [address] [address]  To make an appointment, call [phone]    [facility]  Address: [address] [place], [place] [address]  Appointment Center: [phone] ([phone])     [facility] is a good referral line for crisis and substance abuse help.  [facility] has drop in programs all over the [facility].    Return to the ER for Emergencies.  Return immediately for any new or worsening symptoms or any new concerns

## 2023-10-02 ENCOUNTER — EMERGENCY (EMERGENCY)
Facility: HOSPITAL | Age: 58
LOS: 1 days | Discharge: HOME | End: 2023-10-02
Admitting: STUDENT IN AN ORGANIZED HEALTH CARE EDUCATION/TRAINING PROGRAM
Payer: MEDICAID

## 2023-10-02 VITALS
TEMPERATURE: 98 F | RESPIRATION RATE: 18 BRPM | SYSTOLIC BLOOD PRESSURE: 147 MMHG | HEART RATE: 78 BPM | DIASTOLIC BLOOD PRESSURE: 98 MMHG | WEIGHT: 190.04 LBS | OXYGEN SATURATION: 98 % | HEIGHT: 68 IN

## 2023-10-02 DIAGNOSIS — S22.39XA FRACTURE OF ONE RIB, UNSPECIFIED SIDE, INITIAL ENCOUNTER FOR CLOSED FRACTURE: Chronic | ICD-10-CM

## 2023-10-02 PROCEDURE — 99284 EMERGENCY DEPT VISIT MOD MDM: CPT | Mod: 25

## 2023-10-02 PROCEDURE — 99283 EMERGENCY DEPT VISIT LOW MDM: CPT

## 2023-10-02 RX ORDER — LEVOTHYROXINE SODIUM 125 MCG
200 TABLET ORAL DAILY
Refills: 0 | Status: DISCONTINUED | OUTPATIENT
Start: 2023-10-02 | End: 2023-10-05

## 2023-10-02 RX ORDER — CYCLOBENZAPRINE HYDROCHLORIDE 10 MG/1
1 TABLET, FILM COATED ORAL
Qty: 10 | Refills: 0
Start: 2023-10-02 | End: 2023-10-06

## 2023-10-02 RX ORDER — LIDOCAINE 4 G/100G
2 CREAM TOPICAL ONCE
Refills: 0 | Status: COMPLETED | OUTPATIENT
Start: 2023-10-02 | End: 2023-10-02

## 2023-10-02 RX ORDER — CYCLOBENZAPRINE HYDROCHLORIDE 10 MG/1
10 TABLET, FILM COATED ORAL ONCE
Refills: 0 | Status: COMPLETED | OUTPATIENT
Start: 2023-10-02 | End: 2023-10-02

## 2023-10-02 RX ADMIN — LIDOCAINE 2 PATCH: 4 CREAM TOPICAL at 06:35

## 2023-10-02 RX ADMIN — Medication 200 MICROGRAM(S): at 06:34

## 2023-10-02 RX ADMIN — CYCLOBENZAPRINE HYDROCHLORIDE 10 MILLIGRAM(S): 10 TABLET, FILM COATED ORAL at 06:34

## 2023-10-02 NOTE — ED PROVIDER NOTE - CLINICAL SUMMARY MEDICAL DECISION MAKING FREE TEXT BOX
57 M pmh hypothyroid presents requesting refill of synthroid. no symptoms. also chronic back pain - requesting muscle relaxer.   pt well appearing, stable vitals, exam unremarkable. no tenderness and neuro intact.   will send 1 mo supply synthroid , stressed need for pmd follow up and that synthroid will not be refilled through ER in future   back pain - chronic, given flexeril. no red flag symptoms. no indication for imaging.     All results reviewed with the patient verbally. Discharge plan and return precautions d/w pt who verbalized understanding and agrees with plan. All questions answered. Vitals WNL. Ready for d/c.

## 2023-10-02 NOTE — ED PROVIDER NOTE - OBJECTIVE STATEMENT
57 yr old male, history of hypothyroid presents to the Emergency Department w med refill. pt ran out of his synthroid a few days ago, here requesting refill of synthroid. was seen here 2 weeks ago and was given 2 wk rx and pmd follow up - does not currently have one but pt did not get a new doctor. has been too busy. also reports chronic back pain, requesting muscle relaxer.   denies f/c, HA, dizziness, fainting, chest pain, sob, urinary sxs, trauma.

## 2023-10-02 NOTE — ED PROVIDER NOTE - PATIENT PORTAL LINK FT
You can access the FollowMyHealth Patient Portal offered by [facility] by registering at the following website: [email or web address]. By joining [employer]’s FollowMyHealth portal, you will also be able to view your health information using other applications (apps) compatible with our system.

## 2023-10-02 NOTE — ED PROVIDER NOTE - NSFOLLOWUPINSTRUCTIONS_ED_ALL_ED_FT
Take synthroid as prescribed.     Take flexeril, tylenol / motrin, as needed for back pain.     Follow up with your primary care doctor within 1 week for continued evaluation. If you do not have a primary care doctor call office listed below to make an appointment.     Return to the Emergency Department for persistent, worsening or new symptoms including severe chest pain, shortness of breath, difficulty breathing, nausea/vomiting, excessive sweating, or any other serious concerns.     ___    PRIMARY CARE -  1) [facility]  Phone: [phone]  [address] [place] [address]    2) [facility]  Phone: [phone]  [address] , [place] [address]

## 2023-10-02 NOTE — ED ADULT TRIAGE NOTE - HEIGHT IN INCHES
-Endometrial cancer with mets to abdominal wall & iliac nodes, likely accounting for some of her chronic RLE pain/swelling. Was getting taxol through port weekly. Has had abdominal wall resection in past. Recently had 2nd opinion with Dr. Altaf Arteaga at [facility] as patient stated she did not like the bad news she was given at [facility]. -Dr. Arteaga had recommended to consider hormone therapy. But per discussion with previous medical team, this is not being offered at this time.   -Palliative consulted, goals of care discussed - patient not interested in hospice at this time. Full Code.   -Per patient, oncologist recommended stopping chemotherapy and considering other options after her current condition improves.  -Her oncologist feels that rehab to improve her functional status is a good idea before considering more evaluation or any medical therapy.  -Patient with some anemia noted. Per discussion with daughter and patient, she has chronic anemia. Only occasionally has some minor bleeding from hemorrhoids in the toilet. Iron studies sent. Will start empiric ferrous sulfate 325mg daily and MVI daily. -Should have CBC checked within 3-7 days at rehab. 8

## 2023-11-13 ENCOUNTER — EMERGENCY (EMERGENCY)
Facility: HOSPITAL | Age: 58
LOS: 1 days | Discharge: HOME | End: 2023-11-13
Attending: STUDENT IN AN ORGANIZED HEALTH CARE EDUCATION/TRAINING PROGRAM | Admitting: STUDENT IN AN ORGANIZED HEALTH CARE EDUCATION/TRAINING PROGRAM
Payer: MEDICAID

## 2023-11-13 VITALS
SYSTOLIC BLOOD PRESSURE: 119 MMHG | HEIGHT: 68 IN | OXYGEN SATURATION: 97 % | RESPIRATION RATE: 18 BRPM | DIASTOLIC BLOOD PRESSURE: 80 MMHG | HEART RATE: 88 BPM | TEMPERATURE: 98 F | WEIGHT: 164.02 LBS

## 2023-11-13 DIAGNOSIS — R10.32 LEFT LOWER QUADRANT PAIN: ICD-10-CM

## 2023-11-13 DIAGNOSIS — E03.9 HYPOTHYROIDISM, UNSPECIFIED: ICD-10-CM

## 2023-11-13 DIAGNOSIS — S22.39XA FRACTURE OF ONE RIB, UNSPECIFIED SIDE, INITIAL ENCOUNTER FOR CLOSED FRACTURE: Chronic | ICD-10-CM

## 2023-11-13 DIAGNOSIS — K92.1 MELENA: ICD-10-CM

## 2023-11-13 PROCEDURE — 99284 EMERGENCY DEPT VISIT MOD MDM: CPT

## 2023-11-13 RX ORDER — ACETAMINOPHEN 500 MG
650 TABLET ORAL ONCE
Refills: 0 | Status: COMPLETED | OUTPATIENT
Start: 2023-11-13 | End: 2023-11-13

## 2023-11-13 RX ORDER — IBUPROFEN 200 MG
600 TABLET ORAL ONCE
Refills: 0 | Status: COMPLETED | OUTPATIENT
Start: 2023-11-13 | End: 2023-11-13

## 2023-11-13 RX ADMIN — Medication 600 MILLIGRAM(S): at 16:20

## 2023-11-13 RX ADMIN — Medication 650 MILLIGRAM(S): at 16:21

## 2023-11-13 NOTE — ED PROVIDER NOTE - PATIENT PORTAL LINK FT
You can access the FollowMyHealth Patient Portal offered by [facility] by registering at the following website: [email or web address]. By joining [facility] [employer]’s FollowMyHealth portal, you will also be able to view your health information using other applications (apps) compatible with our system.

## 2023-11-13 NOTE — ED PROVIDER NOTE - CLINICAL SUMMARY MEDICAL DECISION MAKING FREE TEXT BOX
58 year old male with history of hypothyroidism presenting with abdominal pain and bloody stools. Very well appearing here with normal vitals. No clinical evidence of anemia on exam--no pallor, brisk cap refill. Rectal exam itself is reassuring--no abscess or hemorrhoid appreciable, no bleeding or melena. Only mildly uncomfortable to SHANAE--no severe tenderness. No indication for labwork/imaging here. Will refer for expedited GI work up outpatient. Patient agreeable to plan.

## 2023-11-13 NOTE — ED PROVIDER NOTE - PHYSICAL EXAMINATION
Gen - NAD; well-appearing; A+Ox3   HEENT - NCAT, EOMI, moist mucous membranes, clear oropharynx  Neck - supple  Resp - CTAB, no increased WOB  CV -  RRR, no m/r/g  Abd - soft, NT, ND; no guarding or rebound  Back - no CVA tenderness  MSK - FROM of b/l UE and LE, no gross deformities  Extrem - no LE edema/erythema/tenderness  Neuro - no focal motor or sensation deficits  Skin - warm, well perfused  Rectal (chaperone LISBETH Cisneros) - no appreciable external or internal hemorrhoids, no perianal or perirectal abscess, mild discomfort to exam, no brock blood or melena

## 2023-11-13 NOTE — ED ADULT TRIAGE NOTE - BP NONINVASIVE DIASTOLIC (MM HG)
Patient mother requesting to schedule an richard to get flu shot.   Would like an appt after 3:30pm.   
80

## 2023-11-13 NOTE — ED PROVIDER NOTE - OBJECTIVE STATEMENT
58 year old male with history of 58 year old male with history of hypothyroidism presenting with abdominal pain and bloody stools. States he has had intermittent lower quadrant abdominal pain greatest in LLQ associated with intermittent bloody stools. Had an appt to see GI but missed due to court date. Denies fevers, chills, n/v/d, blood thinner use, chest pain, sob, cough, dizziness, weakness. No swelling/drainage/wound around rectum.

## 2023-11-13 NOTE — ED ADULT TRIAGE NOTE - CHIEF COMPLAINT QUOTE
pt presents to ER c/o LLQ abdominal pain with blood in bowel movements for the past month. states he had appointment with GI doctor but missed his appointment because he was in court

## 2023-11-13 NOTE — ED ADULT NURSE NOTE - OBJECTIVE STATEMENT
58M PMH polysubstance abuse presents c/o "I have bleeding from my belly button" and rectum pain x months. pt states he has been seen at multiple hospitals and he "doesn't like the care because all they do is put a piece of gauze in there and I don't want that." writer at bedside to assist provider with rectal exam. no redness, bleeding or discharge noted from rectum or bellybutton. pt denies fever/chills, n/v, weakness/dizziness, CP/SOB or urinary symptoms. pt speaking in clear, complete sentences. RR easy, even, un-labored on room air

## 2023-11-13 NOTE — ED PROVIDER NOTE - NSFOLLOWUPINSTRUCTIONS_ED_ALL_ED_FT
You were seen in the Emergency Department for: abdominal/rectal pain    For pain, you may take Tylenol (acetaminophen) 975 mg every 6 hours, AND/OR Advil (ibuprofen) 600 mg every 8 hours.    Please follow up with a gastroenterologist as discussed. You were referred to our Referral Coordinator and you will be contacted within 48-72 hours to help set up an appointment. If you are not contacted within that time, please call [phone] to reach our coordinator.    You should return to the Emergency Department if you feel any new/worsening/persistent symptoms including but not limited to: fever, chills, persistent bleeding, chest pain, difficulty breathing, loss of consciousness, uncontrolled pain, numbness/weakness of a body part

## 2023-11-15 DIAGNOSIS — K62.89 OTHER SPECIFIED DISEASES OF ANUS AND RECTUM: ICD-10-CM

## 2023-11-15 DIAGNOSIS — R10.32 LEFT LOWER QUADRANT PAIN: ICD-10-CM

## 2023-11-16 ENCOUNTER — APPOINTMENT (OUTPATIENT)
Dept: GASTROENTEROLOGY | Facility: CLINIC | Age: 58
End: 2023-11-16
Payer: MEDICAID

## 2023-11-16 ENCOUNTER — NON-APPOINTMENT (OUTPATIENT)
Age: 58
End: 2023-11-16

## 2023-11-16 VITALS
BODY MASS INDEX: 25.31 KG/M2 | WEIGHT: 167 LBS | TEMPERATURE: 96.7 F | DIASTOLIC BLOOD PRESSURE: 98 MMHG | HEIGHT: 68 IN | SYSTOLIC BLOOD PRESSURE: 156 MMHG | OXYGEN SATURATION: 96 % | HEART RATE: 112 BPM | RESPIRATION RATE: 16 BRPM

## 2023-11-16 DIAGNOSIS — Z00.00 ENCOUNTER FOR GENERAL ADULT MEDICAL EXAMINATION W/OUT ABNORMAL FINDINGS: ICD-10-CM

## 2023-11-16 DIAGNOSIS — Z12.11 ENCOUNTER FOR SCREENING FOR MALIGNANT NEOPLASM OF COLON: ICD-10-CM

## 2023-11-16 PROCEDURE — 99204 OFFICE O/P NEW MOD 45 MIN: CPT

## 2023-11-16 RX ORDER — POLYETHYLENE GLYCOL 3350 AND ELECTROLYTES WITH LEMON FLAVOR 236; 22.74; 6.74; 5.86; 2.97 G/4L; G/4L; G/4L; G/4L; G/4L
236 POWDER, FOR SOLUTION ORAL
Qty: 1 | Refills: 0 | Status: ACTIVE | COMMUNITY
Start: 2023-11-16 | End: 1900-01-01

## 2023-11-20 LAB
ALBUMIN SERPL ELPH-MCNC: 2.2 G/DL
ALP BLD-CCNC: 69 U/L
ALT SERPL-CCNC: 84 U/L
ANION GAP SERPL CALC-SCNC: 9 MMOL/L
AST SERPL-CCNC: 68 U/L
BILIRUB SERPL-MCNC: 0.3 MG/DL
BUN SERPL-MCNC: 12 MG/DL
CALCIUM SERPL-MCNC: 7.6 MG/DL
CHLORIDE SERPL-SCNC: 98 MMOL/L
CO2 SERPL-SCNC: 30 MMOL/L
CREAT SERPL-MCNC: 0.89 MG/DL
CRP SERPL-MCNC: 104 MG/L
EGFR: 99 ML/MIN/1.73M2
FERRITIN SERPL-MCNC: 545 NG/ML
GLUCOSE SERPL-MCNC: 103 MG/DL
HCT VFR BLD CALC: 29 %
HGB BLD-MCNC: 9.1 G/DL
IRON SATN MFR SERPL: 10 %
IRON SERPL-MCNC: 12 UG/DL
MCHC RBC-ENTMCNC: 29.1 PG
MCHC RBC-ENTMCNC: 31.4 GM/DL
MCV RBC AUTO: 92.7 FL
PLATELET # BLD AUTO: 551 K/UL
POTASSIUM SERPL-SCNC: 2.9 MMOL/L
PROT SERPL-MCNC: 5.4 G/DL
RBC # BLD: 3.13 M/UL
RBC # FLD: 14.9 %
SODIUM SERPL-SCNC: 136 MMOL/L
TIBC SERPL-MCNC: 116 UG/DL
UIBC SERPL-MCNC: 104 UG/DL
WBC # FLD AUTO: 8.64 K/UL

## 2023-11-22 NOTE — ED ADULT NURSE NOTE - NS_NURSE_DISC_TEACHING_YN_ED_ALL_ED
Yes Eucrisa Counseling: Patient may experience a mild burning sensation during topical application. Eucrisa is not approved in children less than 3 months of age.

## 2023-11-24 NOTE — ED PROVIDER NOTE - TEMPLATE
Incision site looks good, no infection  Watch for any drainage or swelling with fluid, if you do call our office  Get plenty of rest and no heavy lifting for the next 2 week   General

## 2023-12-07 ENCOUNTER — INPATIENT (INPATIENT)
Facility: HOSPITAL | Age: 58
LOS: 0 days | Discharge: LEFT BEFORE DISCHARGE | DRG: 310 | End: 2023-12-08
Attending: STUDENT IN AN ORGANIZED HEALTH CARE EDUCATION/TRAINING PROGRAM | Admitting: STUDENT IN AN ORGANIZED HEALTH CARE EDUCATION/TRAINING PROGRAM
Payer: MEDICAID

## 2023-12-07 VITALS
SYSTOLIC BLOOD PRESSURE: 114 MMHG | TEMPERATURE: 98 F | HEART RATE: 78 BPM | OXYGEN SATURATION: 99 % | RESPIRATION RATE: 18 BRPM | HEIGHT: 68 IN | DIASTOLIC BLOOD PRESSURE: 76 MMHG

## 2023-12-07 DIAGNOSIS — E03.9 HYPOTHYROIDISM, UNSPECIFIED: ICD-10-CM

## 2023-12-07 DIAGNOSIS — I48.92 UNSPECIFIED ATRIAL FLUTTER: ICD-10-CM

## 2023-12-07 DIAGNOSIS — R79.89 OTHER SPECIFIED ABNORMAL FINDINGS OF BLOOD CHEMISTRY: ICD-10-CM

## 2023-12-07 DIAGNOSIS — D64.9 ANEMIA, UNSPECIFIED: ICD-10-CM

## 2023-12-07 DIAGNOSIS — Z87.09 PERSONAL HISTORY OF OTHER DISEASES OF THE RESPIRATORY SYSTEM: Chronic | ICD-10-CM

## 2023-12-07 DIAGNOSIS — S22.39XA FRACTURE OF ONE RIB, UNSPECIFIED SIDE, INITIAL ENCOUNTER FOR CLOSED FRACTURE: Chronic | ICD-10-CM

## 2023-12-07 LAB
ALBUMIN SERPL ELPH-MCNC: 2.8 G/DL — LOW (ref 3.3–5)
ALBUMIN SERPL ELPH-MCNC: 2.8 G/DL — LOW (ref 3.3–5)
ALP SERPL-CCNC: 70 U/L — SIGNIFICANT CHANGE UP (ref 40–120)
ALP SERPL-CCNC: 70 U/L — SIGNIFICANT CHANGE UP (ref 40–120)
ALT FLD-CCNC: 106 U/L — HIGH (ref 10–45)
ALT FLD-CCNC: 106 U/L — HIGH (ref 10–45)
AMPHET UR-MCNC: NEGATIVE — SIGNIFICANT CHANGE UP
AMPHET UR-MCNC: NEGATIVE — SIGNIFICANT CHANGE UP
ANION GAP SERPL CALC-SCNC: 12 MMOL/L — SIGNIFICANT CHANGE UP (ref 5–17)
ANION GAP SERPL CALC-SCNC: 12 MMOL/L — SIGNIFICANT CHANGE UP (ref 5–17)
APTT BLD: 24.9 SEC — SIGNIFICANT CHANGE UP (ref 24.5–35.6)
APTT BLD: 24.9 SEC — SIGNIFICANT CHANGE UP (ref 24.5–35.6)
AST SERPL-CCNC: 34 U/L — SIGNIFICANT CHANGE UP (ref 10–40)
AST SERPL-CCNC: 34 U/L — SIGNIFICANT CHANGE UP (ref 10–40)
BARBITURATES UR SCN-MCNC: NEGATIVE — SIGNIFICANT CHANGE UP
BARBITURATES UR SCN-MCNC: NEGATIVE — SIGNIFICANT CHANGE UP
BASE EXCESS BLDV CALC-SCNC: 1.2 MMOL/L — SIGNIFICANT CHANGE UP (ref -2–3)
BASE EXCESS BLDV CALC-SCNC: 1.2 MMOL/L — SIGNIFICANT CHANGE UP (ref -2–3)
BENZODIAZ UR-MCNC: NEGATIVE — SIGNIFICANT CHANGE UP
BENZODIAZ UR-MCNC: NEGATIVE — SIGNIFICANT CHANGE UP
BILIRUB SERPL-MCNC: <0.2 MG/DL — SIGNIFICANT CHANGE UP (ref 0.2–1.2)
BILIRUB SERPL-MCNC: <0.2 MG/DL — SIGNIFICANT CHANGE UP (ref 0.2–1.2)
BUN SERPL-MCNC: 21 MG/DL — SIGNIFICANT CHANGE UP (ref 7–23)
BUN SERPL-MCNC: 21 MG/DL — SIGNIFICANT CHANGE UP (ref 7–23)
CA-I SERPL-SCNC: 1.23 MMOL/L — SIGNIFICANT CHANGE UP (ref 1.15–1.33)
CA-I SERPL-SCNC: 1.23 MMOL/L — SIGNIFICANT CHANGE UP (ref 1.15–1.33)
CALCIUM SERPL-MCNC: 8.6 MG/DL — SIGNIFICANT CHANGE UP (ref 8.4–10.5)
CALCIUM SERPL-MCNC: 8.6 MG/DL — SIGNIFICANT CHANGE UP (ref 8.4–10.5)
CHLORIDE SERPL-SCNC: 104 MMOL/L — SIGNIFICANT CHANGE UP (ref 96–108)
CHLORIDE SERPL-SCNC: 104 MMOL/L — SIGNIFICANT CHANGE UP (ref 96–108)
CO2 BLDV-SCNC: 27.3 MMOL/L — HIGH (ref 22–26)
CO2 BLDV-SCNC: 27.3 MMOL/L — HIGH (ref 22–26)
CO2 SERPL-SCNC: 23 MMOL/L — SIGNIFICANT CHANGE UP (ref 22–31)
CO2 SERPL-SCNC: 23 MMOL/L — SIGNIFICANT CHANGE UP (ref 22–31)
COCAINE METAB.OTHER UR-MCNC: NEGATIVE — SIGNIFICANT CHANGE UP
COCAINE METAB.OTHER UR-MCNC: NEGATIVE — SIGNIFICANT CHANGE UP
CREAT SERPL-MCNC: 0.85 MG/DL — SIGNIFICANT CHANGE UP (ref 0.5–1.3)
CREAT SERPL-MCNC: 0.85 MG/DL — SIGNIFICANT CHANGE UP (ref 0.5–1.3)
EGFR: 100 ML/MIN/1.73M2 — SIGNIFICANT CHANGE UP
EGFR: 100 ML/MIN/1.73M2 — SIGNIFICANT CHANGE UP
ETHANOL SERPL-MCNC: <10 MG/DL — SIGNIFICANT CHANGE UP (ref 0–10)
ETHANOL SERPL-MCNC: <10 MG/DL — SIGNIFICANT CHANGE UP (ref 0–10)
GAS PNL BLDV: 136 MMOL/L — SIGNIFICANT CHANGE UP (ref 136–145)
GAS PNL BLDV: 136 MMOL/L — SIGNIFICANT CHANGE UP (ref 136–145)
GAS PNL BLDV: SIGNIFICANT CHANGE UP
GAS PNL BLDV: SIGNIFICANT CHANGE UP
GLUCOSE SERPL-MCNC: 172 MG/DL — HIGH (ref 70–99)
GLUCOSE SERPL-MCNC: 172 MG/DL — HIGH (ref 70–99)
HCO3 BLDV-SCNC: 26 MMOL/L — SIGNIFICANT CHANGE UP (ref 22–29)
HCO3 BLDV-SCNC: 26 MMOL/L — SIGNIFICANT CHANGE UP (ref 22–29)
HCT VFR BLD CALC: 23.5 % — LOW (ref 39–50)
HCT VFR BLD CALC: 23.5 % — LOW (ref 39–50)
HGB BLD-MCNC: 7.3 G/DL — LOW (ref 13–17)
HGB BLD-MCNC: 7.3 G/DL — LOW (ref 13–17)
INR BLD: 0.99 — SIGNIFICANT CHANGE UP (ref 0.85–1.18)
INR BLD: 0.99 — SIGNIFICANT CHANGE UP (ref 0.85–1.18)
LACTATE SERPL-SCNC: 1.2 MMOL/L — SIGNIFICANT CHANGE UP (ref 0.5–2)
LACTATE SERPL-SCNC: 1.2 MMOL/L — SIGNIFICANT CHANGE UP (ref 0.5–2)
LACTATE SERPL-SCNC: 3.2 MMOL/L — HIGH (ref 0.5–2)
LACTATE SERPL-SCNC: 3.2 MMOL/L — HIGH (ref 0.5–2)
LIDOCAIN IGE QN: 41 U/L — SIGNIFICANT CHANGE UP (ref 7–60)
LIDOCAIN IGE QN: 41 U/L — SIGNIFICANT CHANGE UP (ref 7–60)
MAGNESIUM SERPL-MCNC: 1.9 MG/DL — SIGNIFICANT CHANGE UP (ref 1.6–2.6)
MAGNESIUM SERPL-MCNC: 1.9 MG/DL — SIGNIFICANT CHANGE UP (ref 1.6–2.6)
MCHC RBC-ENTMCNC: 31.1 GM/DL — LOW (ref 32–36)
MCHC RBC-ENTMCNC: 31.1 GM/DL — LOW (ref 32–36)
MCHC RBC-ENTMCNC: 31.2 PG — SIGNIFICANT CHANGE UP (ref 27–34)
MCHC RBC-ENTMCNC: 31.2 PG — SIGNIFICANT CHANGE UP (ref 27–34)
MCV RBC AUTO: 100.4 FL — HIGH (ref 80–100)
MCV RBC AUTO: 100.4 FL — HIGH (ref 80–100)
METHADONE UR-MCNC: NEGATIVE — SIGNIFICANT CHANGE UP
METHADONE UR-MCNC: NEGATIVE — SIGNIFICANT CHANGE UP
NRBC # BLD: 0 /100 WBCS — SIGNIFICANT CHANGE UP (ref 0–0)
NRBC # BLD: 0 /100 WBCS — SIGNIFICANT CHANGE UP (ref 0–0)
NT-PROBNP SERPL-SCNC: 1958 PG/ML — HIGH (ref 0–300)
NT-PROBNP SERPL-SCNC: 1958 PG/ML — HIGH (ref 0–300)
OPIATES UR-MCNC: NEGATIVE — SIGNIFICANT CHANGE UP
OPIATES UR-MCNC: NEGATIVE — SIGNIFICANT CHANGE UP
PCO2 BLDV: 41 MMHG — LOW (ref 42–55)
PCO2 BLDV: 41 MMHG — LOW (ref 42–55)
PCP SPEC-MCNC: SIGNIFICANT CHANGE UP
PCP SPEC-MCNC: SIGNIFICANT CHANGE UP
PCP UR-MCNC: POSITIVE
PCP UR-MCNC: POSITIVE
PH BLDV: 7.41 — SIGNIFICANT CHANGE UP (ref 7.32–7.43)
PH BLDV: 7.41 — SIGNIFICANT CHANGE UP (ref 7.32–7.43)
PLATELET # BLD AUTO: 558 K/UL — HIGH (ref 150–400)
PLATELET # BLD AUTO: 558 K/UL — HIGH (ref 150–400)
PO2 BLDV: 53 MMHG — HIGH (ref 25–45)
PO2 BLDV: 53 MMHG — HIGH (ref 25–45)
POTASSIUM BLDV-SCNC: 4.6 MMOL/L — SIGNIFICANT CHANGE UP (ref 3.5–5.1)
POTASSIUM BLDV-SCNC: 4.6 MMOL/L — SIGNIFICANT CHANGE UP (ref 3.5–5.1)
POTASSIUM SERPL-MCNC: 4.6 MMOL/L — SIGNIFICANT CHANGE UP (ref 3.5–5.3)
POTASSIUM SERPL-MCNC: 4.6 MMOL/L — SIGNIFICANT CHANGE UP (ref 3.5–5.3)
POTASSIUM SERPL-SCNC: 4.6 MMOL/L — SIGNIFICANT CHANGE UP (ref 3.5–5.3)
POTASSIUM SERPL-SCNC: 4.6 MMOL/L — SIGNIFICANT CHANGE UP (ref 3.5–5.3)
PROT SERPL-MCNC: 6.2 G/DL — SIGNIFICANT CHANGE UP (ref 6–8.3)
PROT SERPL-MCNC: 6.2 G/DL — SIGNIFICANT CHANGE UP (ref 6–8.3)
PROTHROM AB SERPL-ACNC: 11.3 SEC — SIGNIFICANT CHANGE UP (ref 9.5–13)
PROTHROM AB SERPL-ACNC: 11.3 SEC — SIGNIFICANT CHANGE UP (ref 9.5–13)
RBC # BLD: 2.34 M/UL — LOW (ref 4.2–5.8)
RBC # BLD: 2.34 M/UL — LOW (ref 4.2–5.8)
RBC # FLD: 23.3 % — HIGH (ref 10.3–14.5)
RBC # FLD: 23.3 % — HIGH (ref 10.3–14.5)
SAO2 % BLDV: 81.7 % — SIGNIFICANT CHANGE UP (ref 67–88)
SAO2 % BLDV: 81.7 % — SIGNIFICANT CHANGE UP (ref 67–88)
SODIUM SERPL-SCNC: 139 MMOL/L — SIGNIFICANT CHANGE UP (ref 135–145)
SODIUM SERPL-SCNC: 139 MMOL/L — SIGNIFICANT CHANGE UP (ref 135–145)
T4 AB SER-ACNC: 4.53 UG/DL — SIGNIFICANT CHANGE UP (ref 4.5–11.7)
T4 AB SER-ACNC: 4.53 UG/DL — SIGNIFICANT CHANGE UP (ref 4.5–11.7)
T4 FREE SERPL-MCNC: 0.72 NG/DL — LOW (ref 0.93–1.7)
T4 FREE SERPL-MCNC: 0.72 NG/DL — LOW (ref 0.93–1.7)
THC UR QL: NEGATIVE — SIGNIFICANT CHANGE UP
THC UR QL: NEGATIVE — SIGNIFICANT CHANGE UP
TROPONIN T, HIGH SENSITIVITY RESULT: 26 NG/L — SIGNIFICANT CHANGE UP (ref 0–51)
TROPONIN T, HIGH SENSITIVITY RESULT: 26 NG/L — SIGNIFICANT CHANGE UP (ref 0–51)
TSH SERPL-MCNC: 1.03 UIU/ML — SIGNIFICANT CHANGE UP (ref 0.27–4.2)
TSH SERPL-MCNC: 1.03 UIU/ML — SIGNIFICANT CHANGE UP (ref 0.27–4.2)
WBC # BLD: 8.17 K/UL — SIGNIFICANT CHANGE UP (ref 3.8–10.5)
WBC # BLD: 8.17 K/UL — SIGNIFICANT CHANGE UP (ref 3.8–10.5)
WBC # FLD AUTO: 8.17 K/UL — SIGNIFICANT CHANGE UP (ref 3.8–10.5)
WBC # FLD AUTO: 8.17 K/UL — SIGNIFICANT CHANGE UP (ref 3.8–10.5)

## 2023-12-07 PROCEDURE — 99291 CRITICAL CARE FIRST HOUR: CPT

## 2023-12-07 PROCEDURE — 71045 X-RAY EXAM CHEST 1 VIEW: CPT | Mod: 26

## 2023-12-07 PROCEDURE — 99223 1ST HOSP IP/OBS HIGH 75: CPT | Mod: 25

## 2023-12-07 PROCEDURE — 99407 BEHAV CHNG SMOKING > 10 MIN: CPT

## 2023-12-07 RX ORDER — SODIUM CHLORIDE 9 MG/ML
1000 INJECTION INTRAMUSCULAR; INTRAVENOUS; SUBCUTANEOUS ONCE
Refills: 0 | Status: COMPLETED | OUTPATIENT
Start: 2023-12-07 | End: 2023-12-07

## 2023-12-07 RX ORDER — DILTIAZEM HCL 120 MG
10 CAPSULE, EXT RELEASE 24 HR ORAL ONCE
Refills: 0 | Status: COMPLETED | OUTPATIENT
Start: 2023-12-07 | End: 2023-12-07

## 2023-12-07 RX ORDER — NICOTINE POLACRILEX 2 MG
1 GUM BUCCAL DAILY
Refills: 0 | Status: DISCONTINUED | OUTPATIENT
Start: 2023-12-07 | End: 2023-12-08

## 2023-12-07 RX ORDER — HEPARIN SODIUM 5000 [USP'U]/ML
INJECTION INTRAVENOUS; SUBCUTANEOUS
Qty: 25000 | Refills: 0 | Status: DISCONTINUED | OUTPATIENT
Start: 2023-12-07 | End: 2023-12-08

## 2023-12-07 RX ORDER — LEVOTHYROXINE SODIUM 125 MCG
1 TABLET ORAL
Refills: 0 | DISCHARGE

## 2023-12-07 RX ORDER — LEVOTHYROXINE SODIUM 125 MCG
175 TABLET ORAL DAILY
Refills: 0 | Status: DISCONTINUED | OUTPATIENT
Start: 2023-12-07 | End: 2023-12-08

## 2023-12-07 RX ORDER — SODIUM CHLORIDE 9 MG/ML
500 INJECTION INTRAMUSCULAR; INTRAVENOUS; SUBCUTANEOUS ONCE
Refills: 0 | Status: COMPLETED | OUTPATIENT
Start: 2023-12-07 | End: 2023-12-07

## 2023-12-07 RX ORDER — METOPROLOL TARTRATE 50 MG
12.5 TABLET ORAL
Refills: 0 | Status: DISCONTINUED | OUTPATIENT
Start: 2023-12-07 | End: 2023-12-08

## 2023-12-07 RX ADMIN — HEPARIN SODIUM 1300 UNIT(S)/HR: 5000 INJECTION INTRAVENOUS; SUBCUTANEOUS at 20:37

## 2023-12-07 RX ADMIN — Medication 10 MILLIGRAM(S): at 16:08

## 2023-12-07 RX ADMIN — HEPARIN SODIUM 1300 UNIT(S)/HR: 5000 INJECTION INTRAVENOUS; SUBCUTANEOUS at 21:51

## 2023-12-07 RX ADMIN — SODIUM CHLORIDE 2000 MILLILITER(S): 9 INJECTION INTRAMUSCULAR; INTRAVENOUS; SUBCUTANEOUS at 16:04

## 2023-12-07 RX ADMIN — SODIUM CHLORIDE 1000 MILLILITER(S): 9 INJECTION INTRAMUSCULAR; INTRAVENOUS; SUBCUTANEOUS at 16:42

## 2023-12-07 RX ADMIN — Medication 0.5 MILLIGRAM(S): at 16:04

## 2023-12-07 NOTE — H&P ADULT - NS ATTEND AMEND GEN_ALL_CORE FT
I saw, evaluated, and examined the patient at the bedside. I discussed the patient’s case with the ACP and agree with ACP’s note, ROS findings, physical exam, assessment, and plan as documented in the ACP’s note, with the following addendum.     57 yo man PMHx of HTN, hypothyroidism and recent lower GI bleeding s/p colonoscopy w/ no further active bleeding but w/ continued anemia who was admitted for atrial flutter w/ RVR in setting of PCP use, s/p DCCV now in NSR    Assessment  1) Atrial flutter w/ RVR in setting of PCP use, s/p DCCV now in NSR  2) Hypothyroidism  3) Recent lower GI bleeding s/p colonoscopy w/ no further active bleeding   4) Tobacco use disorder  5) HTN    Plan  1) GI evaluation given recent lower GI bleeding but with necessity for AC given atrial flutter, c/w heparin gtt for now  2) C/w metoprolol tartrate 25mg q8h  3) Telemetry  4) Patient refused ablation  5) C/w synthroid at home dose  6) Patient's tobacco use disorder was discussed and the patient was counseled to quit. The patient was provided with benefits of smoking cessation. Smoking aid w/ nicotine patch was started which the patient agreed to. I spent >10 minutes in total discussing smoking cessation.     During non face-to-face time, I reviewed relevant portions of the patient’s medical record. During face-to-face time, I took a relevant history and examined the patient. I also explained differential diagnoses, relevant cardiac diagnoses, workup, and management plan, which required a high level of medical decision making. I answered all questions related to the patient's medical conditions.     Carissa Valdovinos M.D.  CARDIOLOGY ATTENDING I saw, evaluated, and examined the patient at the bedside. I discussed the patient’s case with the ACP and agree with ACP’s note, ROS findings, physical exam, assessment, and plan as documented in the ACP’s note, with the following addendum.     59 yo man PMHx of HTN, hypothyroidism and recent lower GI bleeding s/p colonoscopy w/ no further active bleeding but w/ continued anemia who was admitted for atrial flutter w/ RVR in setting of PCP use, s/p DCCV now in NSR    Assessment  1) Atrial flutter w/ RVR in setting of PCP use, s/p DCCV now in NSR  2) Hypothyroidism  3) Recent lower GI bleeding s/p colonoscopy w/ no further active bleeding   4) Tobacco use disorder  5) HTN    Plan  1) GI evaluation given recent lower GI bleeding but with necessity for AC given atrial flutter, c/w heparin gtt for now  2) C/w metoprolol tartrate 25mg q8h  3) Telemetry  4) Patient refused ablation  5) C/w synthroid at home dose  6) Patient's tobacco use disorder was discussed and the patient was counseled to quit. The patient was provided with benefits of smoking cessation. Smoking aid w/ nicotine patch was started which the patient agreed to. I spent >10 minutes in total discussing smoking cessation.     During non face-to-face time, I reviewed relevant portions of the patient’s medical record. During face-to-face time, I took a relevant history and examined the patient. I also explained differential diagnoses, relevant cardiac diagnoses, workup, and management plan, which required a high level of medical decision making. I answered all questions related to the patient's medical conditions.     Carissa Valdovinos M.D.  CARDIOLOGY ATTENDING
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
## 2023-12-07 NOTE — ED PROVIDER NOTE - NS ED MD DISPO DIVISION
Pt in room 14 c/o left shoulder pain from fall today. Denies LOC. Pt to rad for xray.    [facility]

## 2023-12-07 NOTE — H&P ADULT - NSHPSOCIALHISTORY_GEN_ALL_CORE
smokes cigars for many years, last use 12/7  reports using weed, last use 1 day ago, denies any other drug use  denies any ETOH use,
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
## 2023-12-07 NOTE — ED PROVIDER NOTE - OBJECTIVE STATEMENT
58-year-old with hypothyroidism presenting with medication refill.  Patient states he wants his Synthroid 175 mg today and last took it yesterday.Noted to be tachycardic in triage, poor historian states was recently admitted for GI bleed one month ago at [place] and has? atrial fibrillation, no other ac use.   Denies any current GI bleed symptoms.  Denies any current chest pain shortness of breath.  States he recently took marijuana this morning and may have been laced with PCP. 58-year-old with hypothyroidism presenting with medication refill.  Patient states he wants his Synthroid 175 mg today and last took it yesterday.Noted to be tachycardic in triage, poor historian states was recently admitted for GI bleed one month ago at [place] and has? atrial fibrillation, no other ac use.   Denies any current GI bleed symptoms.  Denies any current chest pain shortness of breath.  States he recently took marijuana this morning and may have been laced with PCP.

## 2023-12-07 NOTE — H&P ADULT - NEUROLOGICAL
AO X 3, + 5/% strength in b/l UE/LE, no facial droop/slurriness AO X 3, + 5/5  strength in b/l UE/LE, no facial droop/slurriness
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
## 2023-12-07 NOTE — ED ADULT NURSE REASSESSMENT NOTE - NS ED NURSE REASSESS COMMENT FT1
Upon arrival to resuscitation room, pt was connected ot cardiac monitoring with SpO2 monitoring and an 18g PIV was placed in the LAC at 1555. Pt also had an additional 20g PIV inserted in the left forearm. Pt was treated with 1,500NS bolus, 0.5mg Ativan and 10mg Diltiazem, per Dr. Agustin's orders at bedside. Read back was complete and medications were administered with no improvement in pt's HR. See eMAR for administration details. Pt's BP decreased to 92/51. Dr. Agustin made decision to cardiovert pt. After ensuring crash cart and airway supplies were at bedside, pt was cardioverted at 16:19 with 200J. Pt heart rhythm converted to NSR with HR of 90. See flow sheets for additional vitals. Pt AxOx4 after cardioversion and VS WNL. Pt currently resting comfortably, with even, unlabored breathing noted. Upon arrival to resuscitation room, pt was connected to cardiac monitoring with SpO2 monitoring and an 18g PIV was placed in the LAC at 1555. Pt also had an additional 20g PIV inserted in the left forearm. Pt was treated with 1,500NS bolus, 0.5mg Ativan and 10mg Diltiazem, per Dr. Agustin's orders at bedside. Read back was complete and medications were administered with no improvement in pt's HR. See eMAR for administration details. Pt's BP decreased to 91/51 at 16:11. Dr. Agustin made decision to cardiovert pt. After ensuring crash cart and emergency airway supplies were at bedside, pt was cardioverted at 16:19 with 200J. Pt heart rhythm converted to NSR with HR of 90. See flow sheets for additional vitals. Pt AxOx4 after cardioversion and VS WNL. Pt denies chest pain. Pt currently resting comfortably, with even, unlabored breathing noted.
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
## 2023-12-07 NOTE — H&P ADULT - PROBLEM SELECTOR PLAN 1
- s/p succesfful DCCV in  ED, akankshaen NSR  - consult EP in am, NPO for ablation in am (as per EDs d/w Dr. Noriega)  - received Lorazepam 0.5 mg IV x 1, diltizaem 10 IV x 1, NS 1.5 L bolus X 1.   - c/w __________ pending Utox results  - ECHO ordered  - thyroid studeis as below  - f/u Utox  - monitor for bleeding on hep gtt ( consult GI in am for clearance for AC) - s/p succesfful DCCV in  ED, aurelia NSR  - consult EP in am, NPO for ablation in am (as per EDs d/w Dr. Noriega)  - received Lorazepam 0.5 mg IV x 1, diltizaem 10 IV x 1, NS 1.5 L bolus X 1.   - c/w __________ pending Utox results  - ECHO ordered  - thyroid studies as below  - f/u Utox  - monitor for bleeding on hep gtt ( consult GI in am for clearance for AC)

## 2023-12-07 NOTE — ED ADULT NURSE NOTE - OBJECTIVE STATEMENT
Pt is a 58 year old male that initially presented to the ED needing medication refill for his synthroid. Upon arrival pt was found to have HE of 175. Pt reports smoking weed this morning. Pt denies chest pain, SOB and fevers. Pt is AxOx4 upon arrival and ambulating independently.

## 2023-12-07 NOTE — H&P ADULT - PROBLEM SELECTOR PLAN 4
- c/w home synthroid 175 mcg daily  - TSH 1.030, free T4 low 0.178, f/u total T3  - consult endo in am       DVT PPX: heparin gtt   Dispo: NPO for ? ablation in am; consult EP in am.
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
## 2023-12-07 NOTE — H&P ADULT - PROBLEM SELECTOR PLAN 3
- Hg 7.3 on admission, Hg 9.1 11/16/23  -Hg 17-18 2017 (in sunrise)  - reecent ED visit 11/16 for BRPBR (Hg 9 at that time and pt instructed to f/u with GI as o/p)   - no active bleeding in rectal exam  - maintain active T & S, consult GI in am for clearance for AC - Hg 7.3 on admission, Hg 9.1 11/16/23  -Hg 17-18 2017 (in sunrise)  - recent ED visit 11/16 for BRPBR (Hg 9 at that time and pt instructed to f/u with GI as o/p)   - no active bleeding in rectal exam  - maintain active T & S, consult GI in am for clearance for AC

## 2023-12-07 NOTE — ED PROVIDER NOTE - PHYSICAL EXAMINATION
CONSTITUTIONAL: Awake, alert and in no apparent distress.  HEENT: Head is atraumatic. Eyes clear bilaterally, normal EOMI. Airway patent.  CARDIAC: tachycardic Heart sounds S1, S2.   RESPIRATORY: Breath sounds clear and equal bilaterally. no tachypnea, respiratory distress.   GASTROINTESTINAL: Abdomen soft, non-tender, no guarding, distension.  MUSCULOSKELETAL: Spine appears normal, no midline spinal tenderness, range of motion is not limited, no muscle or joint tenderness. no bony tenderness.   NEUROLOGICAL: Alert, no focal deficits, no motor or sensory deficits.  SKIN: Skin normal color for race, warm, dry and intact. No evidence of rash.  PSYCHIATRIC: Normal mood and affect. no apparent risk to self or others.  rectal brown stool

## 2023-12-07 NOTE — PATIENT PROFILE ADULT - FALL HARM RISK - HARM RISK INTERVENTIONS
Communicate Risk of Fall with Harm to all staff/Reinforce activity limits and safety measures with patient and family/Tailored Fall Risk Interventions/Visual Cue: Yellow wristband and red socks/Bed in lowest position, wheels locked, appropriate side rails in place/Call bell, personal items and telephone in reach/Instruct patient to call for assistance before getting out of bed or chair/Non-slip footwear when patient is out of bed/[place] to call system/Physically safe environment - no spills, clutter or unnecessary equipment/Purposeful Proactive Rounding/Room/bathroom lighting operational, light cord in reach Communicate Risk of Fall with Harm to all staff/Reinforce activity limits and safety measures with patient and family/Tailored Fall Risk Interventions/Visual Cue: Yellow wristband and red socks/Bed in lowest position, wheels locked, appropriate side rails in place/Call bell, personal items and telephone in reach/Instruct patient to call for assistance before getting out of bed or chair/Non-slip footwear when patient is out of bed/[place] to call system/Physically safe environment - no spills, clutter or unnecessary equipment/Purposeful Proactive Rounding/Room/bathroom lighting operational, light cord in reach

## 2023-12-07 NOTE — H&P ADULT - NSHPLABSRESULTS_GEN_ALL_CORE
7.3    8.17  )-----------( 558      ( 07 Dec 2023 17:57 )             23.5   12-07    139  |  104  |  21  ----------------------------<  172<H>  4.6   |  23  |  0.85    Ca    8.6      07 Dec 2023 16:13  Mg     1.9     12-07    TPro  6.2  /  Alb  2.8<L>  /  TBili  <0.2  /  DBili  x   /  AST  34  /  ALT  106<H>  /  AlkPhos  70  12-07

## 2023-12-07 NOTE — ED ADULT NURSE NOTE - NSFALLUNIVINTERV_ED_ALL_ED
Bed/Stretcher in lowest position, wheels locked, appropriate side rails in place/Call bell, personal items and telephone in reach/Instruct patient to call for assistance before getting out of bed/chair/stretcher/Non-slip footwear applied when patient is off stretcher/[place] to call system/Physically safe environment - no spills, clutter or unnecessary equipment/Purposeful proactive rounding/Room/bathroom lighting operational, light cord in reach Bed/Stretcher in lowest position, wheels locked, appropriate side rails in place/Call bell, personal items and telephone in reach/Instruct patient to call for assistance before getting out of bed/chair/stretcher/Non-slip footwear applied when patient is off stretcher/[place] to call system/Physically safe environment - no spills, clutter or unnecessary equipment/Purposeful proactive rounding/Room/bathroom lighting operational, light cord in reach

## 2023-12-07 NOTE — ED PROVIDER NOTE - CARE PLAN
1 Principal Discharge DX:	Atrial flutter with rapid ventricular response  Secondary Diagnosis:	Anemia

## 2023-12-08 ENCOUNTER — TRANSCRIPTION ENCOUNTER (OUTPATIENT)
Age: 58
End: 2023-12-08

## 2023-12-08 VITALS — HEIGHT: 68 IN | WEIGHT: 167.55 LBS

## 2023-12-08 LAB
A1C WITH ESTIMATED AVERAGE GLUCOSE RESULT: 5.3 % — SIGNIFICANT CHANGE UP (ref 4–5.6)
A1C WITH ESTIMATED AVERAGE GLUCOSE RESULT: 5.3 % — SIGNIFICANT CHANGE UP (ref 4–5.6)
ANION GAP SERPL CALC-SCNC: 5 MMOL/L — SIGNIFICANT CHANGE UP (ref 5–17)
ANION GAP SERPL CALC-SCNC: 5 MMOL/L — SIGNIFICANT CHANGE UP (ref 5–17)
APTT BLD: 40.3 SEC — HIGH (ref 24.5–35.6)
APTT BLD: 40.3 SEC — HIGH (ref 24.5–35.6)
APTT BLD: 42.5 SEC — HIGH (ref 24.5–35.6)
APTT BLD: 42.5 SEC — HIGH (ref 24.5–35.6)
BUN SERPL-MCNC: 18 MG/DL — SIGNIFICANT CHANGE UP (ref 7–23)
BUN SERPL-MCNC: 18 MG/DL — SIGNIFICANT CHANGE UP (ref 7–23)
CALCIUM SERPL-MCNC: 8.1 MG/DL — LOW (ref 8.4–10.5)
CALCIUM SERPL-MCNC: 8.1 MG/DL — LOW (ref 8.4–10.5)
CHLORIDE SERPL-SCNC: 105 MMOL/L — SIGNIFICANT CHANGE UP (ref 96–108)
CHLORIDE SERPL-SCNC: 105 MMOL/L — SIGNIFICANT CHANGE UP (ref 96–108)
CHOLEST SERPL-MCNC: 136 MG/DL — SIGNIFICANT CHANGE UP
CHOLEST SERPL-MCNC: 136 MG/DL — SIGNIFICANT CHANGE UP
CO2 SERPL-SCNC: 26 MMOL/L — SIGNIFICANT CHANGE UP (ref 22–31)
CO2 SERPL-SCNC: 26 MMOL/L — SIGNIFICANT CHANGE UP (ref 22–31)
CREAT SERPL-MCNC: 0.81 MG/DL — SIGNIFICANT CHANGE UP (ref 0.5–1.3)
CREAT SERPL-MCNC: 0.81 MG/DL — SIGNIFICANT CHANGE UP (ref 0.5–1.3)
EGFR: 102 ML/MIN/1.73M2 — SIGNIFICANT CHANGE UP
EGFR: 102 ML/MIN/1.73M2 — SIGNIFICANT CHANGE UP
ESTIMATED AVERAGE GLUCOSE: 105 MG/DL — SIGNIFICANT CHANGE UP (ref 68–114)
ESTIMATED AVERAGE GLUCOSE: 105 MG/DL — SIGNIFICANT CHANGE UP (ref 68–114)
GLUCOSE SERPL-MCNC: 107 MG/DL — HIGH (ref 70–99)
GLUCOSE SERPL-MCNC: 107 MG/DL — HIGH (ref 70–99)
HCT VFR BLD CALC: 26.1 % — LOW (ref 39–50)
HCT VFR BLD CALC: 26.1 % — LOW (ref 39–50)
HCT VFR BLD CALC: 26.9 % — LOW (ref 39–50)
HCT VFR BLD CALC: 26.9 % — LOW (ref 39–50)
HDLC SERPL-MCNC: 52 MG/DL — SIGNIFICANT CHANGE UP
HDLC SERPL-MCNC: 52 MG/DL — SIGNIFICANT CHANGE UP
HGB BLD-MCNC: 8 G/DL — LOW (ref 13–17)
HGB BLD-MCNC: 8 G/DL — LOW (ref 13–17)
HGB BLD-MCNC: 8.2 G/DL — LOW (ref 13–17)
HGB BLD-MCNC: 8.2 G/DL — LOW (ref 13–17)
LIPID PNL WITH DIRECT LDL SERPL: 67 MG/DL — SIGNIFICANT CHANGE UP
LIPID PNL WITH DIRECT LDL SERPL: 67 MG/DL — SIGNIFICANT CHANGE UP
MAGNESIUM SERPL-MCNC: 2 MG/DL — SIGNIFICANT CHANGE UP (ref 1.6–2.6)
MAGNESIUM SERPL-MCNC: 2 MG/DL — SIGNIFICANT CHANGE UP (ref 1.6–2.6)
MCHC RBC-ENTMCNC: 30.5 GM/DL — LOW (ref 32–36)
MCHC RBC-ENTMCNC: 30.5 GM/DL — LOW (ref 32–36)
MCHC RBC-ENTMCNC: 30.6 PG — SIGNIFICANT CHANGE UP (ref 27–34)
MCHC RBC-ENTMCNC: 30.6 PG — SIGNIFICANT CHANGE UP (ref 27–34)
MCHC RBC-ENTMCNC: 30.7 GM/DL — LOW (ref 32–36)
MCHC RBC-ENTMCNC: 30.7 GM/DL — LOW (ref 32–36)
MCHC RBC-ENTMCNC: 31 PG — SIGNIFICANT CHANGE UP (ref 27–34)
MCHC RBC-ENTMCNC: 31 PG — SIGNIFICANT CHANGE UP (ref 27–34)
MCV RBC AUTO: 100.4 FL — HIGH (ref 80–100)
MCV RBC AUTO: 100.4 FL — HIGH (ref 80–100)
MCV RBC AUTO: 101.2 FL — HIGH (ref 80–100)
MCV RBC AUTO: 101.2 FL — HIGH (ref 80–100)
NON HDL CHOLESTEROL: 84 MG/DL — SIGNIFICANT CHANGE UP
NON HDL CHOLESTEROL: 84 MG/DL — SIGNIFICANT CHANGE UP
NRBC # BLD: 0 /100 WBCS — SIGNIFICANT CHANGE UP (ref 0–0)
NRBC # BLD: 0 /100 WBCS — SIGNIFICANT CHANGE UP (ref 0–0)
NRBC # BLD: 2 /100 WBCS — HIGH (ref 0–0)
NRBC # BLD: 2 /100 WBCS — HIGH (ref 0–0)
PLATELET # BLD AUTO: 597 K/UL — HIGH (ref 150–400)
PLATELET # BLD AUTO: 597 K/UL — HIGH (ref 150–400)
PLATELET # BLD AUTO: 615 K/UL — HIGH (ref 150–400)
PLATELET # BLD AUTO: 615 K/UL — HIGH (ref 150–400)
POTASSIUM SERPL-MCNC: 4 MMOL/L — SIGNIFICANT CHANGE UP (ref 3.5–5.3)
POTASSIUM SERPL-MCNC: 4 MMOL/L — SIGNIFICANT CHANGE UP (ref 3.5–5.3)
POTASSIUM SERPL-SCNC: 4 MMOL/L — SIGNIFICANT CHANGE UP (ref 3.5–5.3)
POTASSIUM SERPL-SCNC: 4 MMOL/L — SIGNIFICANT CHANGE UP (ref 3.5–5.3)
RBC # BLD: 2.58 M/UL — LOW (ref 4.2–5.8)
RBC # BLD: 2.58 M/UL — LOW (ref 4.2–5.8)
RBC # BLD: 2.68 M/UL — LOW (ref 4.2–5.8)
RBC # BLD: 2.68 M/UL — LOW (ref 4.2–5.8)
RBC # FLD: 24 % — HIGH (ref 10.3–14.5)
RBC # FLD: 24 % — HIGH (ref 10.3–14.5)
RBC # FLD: 24.3 % — HIGH (ref 10.3–14.5)
RBC # FLD: 24.3 % — HIGH (ref 10.3–14.5)
SODIUM SERPL-SCNC: 136 MMOL/L — SIGNIFICANT CHANGE UP (ref 135–145)
SODIUM SERPL-SCNC: 136 MMOL/L — SIGNIFICANT CHANGE UP (ref 135–145)
T3 SERPL-MCNC: 67 NG/DL — LOW (ref 80–200)
T3 SERPL-MCNC: 67 NG/DL — LOW (ref 80–200)
TRIGL SERPL-MCNC: 84 MG/DL — SIGNIFICANT CHANGE UP
TRIGL SERPL-MCNC: 84 MG/DL — SIGNIFICANT CHANGE UP
WBC # BLD: 8.05 K/UL — SIGNIFICANT CHANGE UP (ref 3.8–10.5)
WBC # BLD: 8.05 K/UL — SIGNIFICANT CHANGE UP (ref 3.8–10.5)
WBC # BLD: 8.73 K/UL — SIGNIFICANT CHANGE UP (ref 3.8–10.5)
WBC # BLD: 8.73 K/UL — SIGNIFICANT CHANGE UP (ref 3.8–10.5)
WBC # FLD AUTO: 8.05 K/UL — SIGNIFICANT CHANGE UP (ref 3.8–10.5)
WBC # FLD AUTO: 8.05 K/UL — SIGNIFICANT CHANGE UP (ref 3.8–10.5)
WBC # FLD AUTO: 8.73 K/UL — SIGNIFICANT CHANGE UP (ref 3.8–10.5)
WBC # FLD AUTO: 8.73 K/UL — SIGNIFICANT CHANGE UP (ref 3.8–10.5)

## 2023-12-08 PROCEDURE — 83036 HEMOGLOBIN GLYCOSYLATED A1C: CPT

## 2023-12-08 PROCEDURE — 83880 ASSAY OF NATRIURETIC PEPTIDE: CPT

## 2023-12-08 PROCEDURE — 84443 ASSAY THYROID STIM HORMONE: CPT

## 2023-12-08 PROCEDURE — 80061 LIPID PANEL: CPT

## 2023-12-08 PROCEDURE — 96375 TX/PRO/DX INJ NEW DRUG ADDON: CPT

## 2023-12-08 PROCEDURE — 82330 ASSAY OF CALCIUM: CPT

## 2023-12-08 PROCEDURE — 99239 HOSP IP/OBS DSCHRG MGMT >30: CPT

## 2023-12-08 PROCEDURE — 84439 ASSAY OF FREE THYROXINE: CPT

## 2023-12-08 PROCEDURE — 82803 BLOOD GASES ANY COMBINATION: CPT

## 2023-12-08 PROCEDURE — 85730 THROMBOPLASTIN TIME PARTIAL: CPT

## 2023-12-08 PROCEDURE — 96374 THER/PROPH/DIAG INJ IV PUSH: CPT

## 2023-12-08 PROCEDURE — 84436 ASSAY OF TOTAL THYROXINE: CPT

## 2023-12-08 PROCEDURE — 80053 COMPREHEN METABOLIC PANEL: CPT

## 2023-12-08 PROCEDURE — 84295 ASSAY OF SERUM SODIUM: CPT

## 2023-12-08 PROCEDURE — 93005 ELECTROCARDIOGRAM TRACING: CPT

## 2023-12-08 PROCEDURE — 85610 PROTHROMBIN TIME: CPT

## 2023-12-08 PROCEDURE — 83605 ASSAY OF LACTIC ACID: CPT

## 2023-12-08 PROCEDURE — 84480 ASSAY TRIIODOTHYRONINE (T3): CPT

## 2023-12-08 PROCEDURE — 80307 DRUG TEST PRSMV CHEM ANLYZR: CPT

## 2023-12-08 PROCEDURE — 85027 COMPLETE CBC AUTOMATED: CPT

## 2023-12-08 PROCEDURE — 99291 CRITICAL CARE FIRST HOUR: CPT | Mod: 25

## 2023-12-08 PROCEDURE — 80048 BASIC METABOLIC PNL TOTAL CA: CPT

## 2023-12-08 PROCEDURE — 84132 ASSAY OF SERUM POTASSIUM: CPT

## 2023-12-08 PROCEDURE — 36415 COLL VENOUS BLD VENIPUNCTURE: CPT

## 2023-12-08 PROCEDURE — 83735 ASSAY OF MAGNESIUM: CPT

## 2023-12-08 PROCEDURE — 71045 X-RAY EXAM CHEST 1 VIEW: CPT

## 2023-12-08 PROCEDURE — 82962 GLUCOSE BLOOD TEST: CPT

## 2023-12-08 PROCEDURE — 83690 ASSAY OF LIPASE: CPT

## 2023-12-08 PROCEDURE — 84484 ASSAY OF TROPONIN QUANT: CPT

## 2023-12-08 PROCEDURE — 99222 1ST HOSP IP/OBS MODERATE 55: CPT

## 2023-12-08 RX ORDER — ASCORBIC ACID 60 MG
1 TABLET,CHEWABLE ORAL
Qty: 0 | Refills: 0 | DISCHARGE
Start: 2023-12-08

## 2023-12-08 RX ORDER — LEVOTHYROXINE SODIUM 125 MCG
1 TABLET ORAL
Qty: 30 | Refills: 0
Start: 2023-12-08 | End: 2024-01-06

## 2023-12-08 RX ORDER — METOPROLOL TARTRATE 50 MG
1 TABLET ORAL
Qty: 90 | Refills: 2
Start: 2023-12-08 | End: 2024-03-06

## 2023-12-08 RX ORDER — APIXABAN 2.5 MG/1
1 TABLET, FILM COATED ORAL
Qty: 60 | Refills: 3
Start: 2023-12-08 | End: 2024-04-05

## 2023-12-08 RX ORDER — PANTOPRAZOLE SODIUM 20 MG/1
40 TABLET, DELAYED RELEASE ORAL ONCE
Refills: 0 | Status: COMPLETED | OUTPATIENT
Start: 2023-12-08 | End: 2023-12-08

## 2023-12-08 RX ORDER — HEPARIN SODIUM 5000 [USP'U]/ML
INJECTION INTRAVENOUS; SUBCUTANEOUS
Qty: 25000 | Refills: 0 | Status: DISCONTINUED | OUTPATIENT
Start: 2023-12-08 | End: 2023-12-08

## 2023-12-08 RX ORDER — HEPARIN SODIUM 5000 [USP'U]/ML
6000 INJECTION INTRAVENOUS; SUBCUTANEOUS EVERY 6 HOURS
Refills: 0 | Status: DISCONTINUED | OUTPATIENT
Start: 2023-12-08 | End: 2023-12-08

## 2023-12-08 RX ORDER — HEPARIN SODIUM 5000 [USP'U]/ML
6000 INJECTION INTRAVENOUS; SUBCUTANEOUS ONCE
Refills: 0 | Status: DISCONTINUED | OUTPATIENT
Start: 2023-12-08 | End: 2023-12-08

## 2023-12-08 RX ORDER — FERROUS SULFATE 325(65) MG
325 TABLET ORAL ONCE
Refills: 0 | Status: COMPLETED | OUTPATIENT
Start: 2023-12-08 | End: 2023-12-08

## 2023-12-08 RX ORDER — APIXABAN 2.5 MG/1
1 TABLET, FILM COATED ORAL
Qty: 60 | Refills: 2
Start: 2023-12-08 | End: 2024-03-06

## 2023-12-08 RX ORDER — APIXABAN 2.5 MG/1
5 TABLET, FILM COATED ORAL EVERY 12 HOURS
Refills: 0 | Status: DISCONTINUED | OUTPATIENT
Start: 2023-12-08 | End: 2023-12-08

## 2023-12-08 RX ORDER — PANTOPRAZOLE SODIUM 20 MG/1
1 TABLET, DELAYED RELEASE ORAL
Qty: 30 | Refills: 0
Start: 2023-12-08 | End: 2024-01-06

## 2023-12-08 RX ORDER — METOPROLOL TARTRATE 50 MG
1 TABLET ORAL
Qty: 30 | Refills: 3
Start: 2023-12-08 | End: 2024-04-05

## 2023-12-08 RX ORDER — ASCORBIC ACID 60 MG
500 TABLET,CHEWABLE ORAL DAILY
Refills: 0 | Status: DISCONTINUED | OUTPATIENT
Start: 2023-12-08 | End: 2023-12-08

## 2023-12-08 RX ORDER — HEPARIN SODIUM 5000 [USP'U]/ML
3000 INJECTION INTRAVENOUS; SUBCUTANEOUS EVERY 6 HOURS
Refills: 0 | Status: DISCONTINUED | OUTPATIENT
Start: 2023-12-08 | End: 2023-12-08

## 2023-12-08 RX ORDER — ASCORBIC ACID 60 MG
1 TABLET,CHEWABLE ORAL
Qty: 30 | Refills: 0
Start: 2023-12-08 | End: 2024-01-06

## 2023-12-08 RX ORDER — METOPROLOL TARTRATE 50 MG
25 TABLET ORAL THREE TIMES A DAY
Refills: 0 | Status: DISCONTINUED | OUTPATIENT
Start: 2023-12-08 | End: 2023-12-08

## 2023-12-08 RX ADMIN — Medication 175 MICROGRAM(S): at 05:24

## 2023-12-08 RX ADMIN — PANTOPRAZOLE SODIUM 40 MILLIGRAM(S): 20 TABLET, DELAYED RELEASE ORAL at 12:16

## 2023-12-08 RX ADMIN — APIXABAN 5 MILLIGRAM(S): 2.5 TABLET, FILM COATED ORAL at 14:22

## 2023-12-08 RX ADMIN — Medication 12.5 MILLIGRAM(S): at 05:24

## 2023-12-08 RX ADMIN — Medication 500 MILLIGRAM(S): at 12:16

## 2023-12-08 RX ADMIN — Medication 325 MILLIGRAM(S): at 12:16

## 2023-12-08 RX ADMIN — HEPARIN SODIUM 1500 UNIT(S)/HR: 5000 INJECTION INTRAVENOUS; SUBCUTANEOUS at 08:58

## 2023-12-08 RX ADMIN — HEPARIN SODIUM 1500 UNIT(S)/HR: 5000 INJECTION INTRAVENOUS; SUBCUTANEOUS at 02:52

## 2023-12-08 RX ADMIN — HEPARIN SODIUM 1700 UNIT(S)/HR: 5000 INJECTION INTRAVENOUS; SUBCUTANEOUS at 10:14

## 2023-12-08 RX ADMIN — Medication 1 TABLET(S): at 12:16

## 2023-12-08 NOTE — DISCHARGE NOTE NURSING/CASE MANAGEMENT/SOCIAL WORK - PATIENT PORTAL LINK FT
You can access the FollowMyHealth Patient Portal offered by [facility] by registering at the following website: [email or web address]. By joining [employer]’s FollowMyHealth portal, you will also be able to view your health information using other applications (apps) compatible with our system. You can access the FollowMyHealth Patient Portal offered by [facility] by registering at the following website: [email or web address]. By joining [employer]’s FollowMyHealth portal, you will also be able to view your health information using other applications (apps) compatible with our system.

## 2023-12-08 NOTE — DISCHARGE NOTE PROVIDER - NSDCMRMEDTOKEN_GEN_ALL_CORE_FT
Eliquis 5 mg oral tablet: 1 tab(s) orally 2 times a day  Synthroid 175 mcg (0.175 mg) oral tablet: 1 tab(s) orally once a day  Toprol-XL 50 mg oral tablet, extended release: 1 tab(s) orally once a day   apixaban 5 mg oral tablet: 1 tab(s) orally every 12 hours  ascorbic acid 500 mg oral tablet: 1 tab(s) orally once a day  Metoprolol Tartrate 25 mg oral tablet: 1 tab(s) orally 3 times a day  Multiple Vitamins oral tablet: 1 tab(s) orally once a day  Synthroid 175 mcg (0.175 mg) oral tablet: 1 tab(s) orally once a day   apixaban 5 mg oral tablet: 1 tab(s) orally every 12 hours  ascorbic acid 500 mg oral tablet: 1 tab(s) orally once a day  Metoprolol Tartrate 25 mg oral tablet: 1 tab(s) orally 3 times a day  Multiple Vitamins oral tablet: 1 tab(s) orally once a day  Multiple Vitamins oral tablet: 1 tab(s) orally once a day  pantoprazole 40 mg oral delayed release tablet: 1 tab(s) orally once a day  Synthroid 175 mcg (0.175 mg) oral tablet: 1 tab(s) orally once a day  Vitamin C 500 mg oral tablet: 1 tab(s) orally once a day   apixaban 5 mg oral tablet: 1 tab(s) orally every 12 hours  ascorbic acid 500 mg oral tablet: 1 tab(s) orally once a day  Metoprolol Tartrate 25 mg oral tablet: 1 tab(s) orally 3 times a day  Multiple Vitamins oral tablet: 1 tab(s) orally once a day  Multiple Vitamins oral tablet: 1 tab(s) orally once a day  pantoprazole 40 mg oral delayed release tablet: 1 tab(s) orally once a day  Synthroid 175 mcg (0.175 mg) oral tablet: 1 tab(s) orally once a day  Synthroid 175 mcg (0.175 mg) oral tablet: 1 tab(s) orally once a day  Vitamin C 500 mg oral tablet: 1 tab(s) orally once a day

## 2023-12-08 NOTE — DISCHARGE NOTE NURSING/CASE MANAGEMENT/SOCIAL WORK - NSDCPEFALRISK_GEN_ALL_CORE
For information on Fall & Injury Prevention, visit: [email or web address] OR  [email or web address] OR  https://www.cdc.gov/steadi/patient.html For information on Fall & Injury Prevention, visit: [email or web address] OR  [email or web address] OR  https://www.cdc.gov/steadi/patient.html

## 2023-12-08 NOTE — DISCHARGE NOTE NURSING/CASE MANAGEMENT/SOCIAL WORK - NSDCVIVACCINE_GEN_ALL_CORE_FT
Tdap; 08-Dec-2019 00:42; Diane Be (RN); [employer]; [id number] (Exp. Date: 26-Jul-2021); IntraMuscular; Deltoid Right.; 0.5 milliLiter(s); VIS (VIS Published: 09-May-2013, VIS Presented: 08-Dec-2019);    Tdap; 08-Dec-2019 00:42; Diane Be (RN); [employer]; [id number] (Exp. Date: 26-Jul-2021); IntraMuscular; Deltoid Right.; 0.5 milliLiter(s); VIS (VIS Published: 09-May-2013, VIS Presented: 08-Dec-2019);

## 2023-12-08 NOTE — DISCHARGE NOTE PROVIDER - PROVIDER TOKENS
PROVIDER:[TOKEN:[[id number]],FOLLOWUP:[1 week]] PROVIDER:[TOKEN:[[id number]],FOLLOWUP:[1 week]],FREE:[LAST:[Gastroenterology],PHONE:[phone]:[(   )    -],ADDRESS:[[address] [place] [address]]] PROVIDER:[TOKEN:[[id number]],FOLLOWUP:[1 week]],FREE:[LAST:[Gastroenterology],PHONE:[phone]:[(   )    -],ADDRESS:[[address] [place] [address]]]

## 2023-12-08 NOTE — CONSULT NOTE ADULT - ASSESSMENT
59 y/o M, PMH hypothyroidism, presented to [facility] ED 12/7/23 for medication refill for synthroid    GI consulted for anemia, ?brbpr    Patient reports no further blood per rectum, and reports having a colonoscopy 2 weeks ago    he refused any endoscopic procedure    He is an extremely poor historian    From GI perspective, the patient should be placed on the cardiac medications that would be beneficial to his current presentation, such as AC post ablation / DCCV    Thank you for allowing us to participate in the care of this patient. GI will sign off the case.  Please call us if you have any further questions or concerns    Krishna Zamora M.D.  Gastroenterology Fellow  Weekday 7am-5pm Pager: [phone]  Weeknights/Weekend/Holiday Coverage: Please Call the  for contact info  Follow Up Questions welcome via [facility] [employer] Messenger   59 y/o M, PMH hypothyroidism, presented to [facility] ED 12/7/23 for medication refill for synthroid    GI consulted for anemia, ?brbpr    Patient reports no further blood per rectum, and reports having a colonoscopy 2 weeks ago    he refused any endoscopic procedure    He is an extremely poor historian    From GI perspective, the patient should be placed on the cardiac medications that would be beneficial to his current presentation, such as AC post ablation / DCCV    Thank you for allowing us to participate in the care of this patient. GI will sign off the case.  Please call us if you have any further questions or concerns    Krishna Zamora M.D.  Gastroenterology Fellow  Weekday 7am-5pm Pager: [phone]  Weeknights/Weekend/Holiday Coverage: Please Call the  for contact info  Follow Up Questions welcome via [facility] [employer] Teams Messenger
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
## 2023-12-08 NOTE — DISCHARGE NOTE PROVIDER - HOSPITAL COURSE
58M, h/o non compliance and poor f/u, and PMHx of hypothyroidism and recent ER admission 11/16/23 for BRPBR (Hg 9 at that time and pt instructed to f/u with GI as o/p, denies any since), who presented to [facility] ED 12/7/23 for medication refill for synthroid (frequent ER visit for med refills). In ED, pt lethargic and in AF w/ RVR (HR 170s). Pt successfully DCCV in ED and admitted to cardiac tele for further management. EP consulted and recommending ablation, however pt refused. Pt also refusing GI consult despite need for long term AC and Hgb 7.3 on admission and recent ED visit for BRBPR.      58M, h/o non compliance and poor f/u, and PMHx of hypothyroidism and recent ER admission 11/16/23 for BRPBR (Hg 9 at that time and pt instructed to f/u with GI as o/p, denies any since), who presented to [facility] ED 12/7/23 for medication refill for synthroid (frequent ER visit for med refills). In ED, pt lethargic and in AF w/ RVR (HR 170s). Pt successfully DCCV in ED and admitted to cardiac tele for further management. EP consulted and recommending ablation, however pt refused. Pt also refusing GI consult despite need for long term AC and Hgb 7.3 on admission and recent ED visit for BRBPR.      58M, h/o non compliance and poor f/u, and PMHx of hypothyroidism and recent ER admission 11/16/23 for BRPBR (Hg 9 at that time and pt instructed to f/u with GI as o/p, denies any since), who presented to [facility] ED 12/7/23 for medication refill for synthroid (frequent ER visit for med refills). In ED, pt lethargic and in AF w/ RVR (HR 170s). Pt successfully underwent DCCV in ED converting Atrial flutter to NSR and was admitted to cardiac tele for further management. EP consulted and recommended ablation, but pt refused. Pt also refused GI workup despite the need for GI workup before committing to long term AC. Patient was advised to start Eliquis 5mg BID as the patient agreed that the benefit of systemic embolization prevention outweighed risk of bleeding, especially now that the patient did not have any further clinical bleeding and GI was okay with continuing with AC. The patient left AMA so he was advised to f/u with GI, endocrine, and cardiology as outpatient within a week, and patient verbally promised he will.      58M, h/o non compliance and poor f/u, and PMHx of hypothyroidism and recent ER admission 11/16/23 for BRPBR (Hg 9 at that time and pt instructed to f/u with GI as o/p, denies any since), who presented to [facility] ED 12/7/23 for medication refill for synthroid (frequent ER visit for med refills). In ED, pt lethargic and in AF w/ RVR (HR 170s). Pt successfully underwent DCCV in ED converting Atrial flutter to NSR and was admitted to cardiac tele for further management. EP consulted and recommended ablation, but pt refused. Pt also refused GI workup despite the need for GI workup before committing to long term AC. Patient was advised to start Eliquis 5mg BID as the patient agreed that the benefit of systemic embolization prevention outweighed risk of bleeding, especially now that the patient did not have any further clinical bleeding and GI was okay with continuing with AC. The patient left AMA so he was advised to f/u with GI, endocrine, and cardiology as outpatient within a week, and patient verbally promised he will.      58-year-old male, h/o non-compliance and poor f/u, and PMHx of hypothyroidism and recent ER admission 11/16/23 for BRPBR (Hg 9 at that time and pt instructed to f/u with GI as o/p, denies any since), who presented to [facility] ED 12/7/23 for medication refill for synthroid (frequent ER visit for med refills). In ED, pt lethargic and in AF w/ RVR (HR 170s). Pt successfully underwent DCCV in ED converting Atrial flutter to NSR and was admitted to cardiac tele for further management. EP consulted and recommended ablation, but pt refused. Pt was evaluated. Patient was advised to start Eliquis 5mg BID as the patient agreed that the benefit of systemic embolization prevention outweighed risk of bleeding, especially now that the patient did not have any further clinical bleeding and GI was okay with continuing with AC. The patient left AMA so he was advised to f/u with GI, endocrine, and cardiology as outpatient within a week, and patient verbally promised he will.      58-year-old male, h/o non-compliance and poor f/u, and PMHx of hypothyroidism and recent ER admission 11/16/23 for BRPBR (Hg 9 at that time and pt instructed to f/u with GI as o/p, denies any since), who presented to [facility] ED 12/7/23 for medication refill for synthroid (frequent ER visit for med refills). In ED, pt lethargic and in AF w/ RVR (HR 170s). Pt successfully underwent DCCV in ED converting Atrial flutter to NSR and was admitted to cardiac tele for further management. EP consulted and recommended ablation, but pt refused to have conversation regarding atrial flutter and risk for stroke w/ EP team. Patient was evaluated by GI team reporting no further blood per rectum and colonoscopy 2 weeks ago; refused endoscopic procedure; per GI patient should be placed on cardiac medications that would benefit current presentation such as AC post DCCV. Patient was advised to start Eliquis 5mg BID as the patient agreed that the benefit of systemic embolization prevention outweighed risk of bleeding, especially now that the patient did not have any further clinical bleeding and GI was okay with continuing with AC. Patient was advised to undergo TTE to evaluate etiology of AFlutter and patient refusing stating he has to leave hospital and does not wish to stay for the study. Explained to patient risk of undetected cardiac abnormalities that confer risk of heart attack, stroke, or death; and patient expresses understanding of risks and exhibits decisional capacity to leave AMA. The patient left AMA so he was advised to f/u with GI, endocrine, and cardiology as outpatient within a week, and patient verbally promised he will.     Despite extensive discussion regarding the benefits of staying vs the risks associated with leaving against medical advice, patient has decided to leave against medical advice (AMA). Pt has a normal mental status (AOX3) and exhibits full decisional capacity. I explained the risks, benefits, and alternatives to treatment. The patient verbalized understanding of his condition and the risks of leaving AMA, including but not limited to:   -stroke  -heart attack  -permanent disability   -death     Pt had the opportunity to ask questions about his medical condition. Medical staff believes the patient fully understands what has been explained and answered. The patient has been informed that he may return for care at any time, and has been referred to her local medical physician for follow up ASAP; paperwork referrals given. I informed Dr. Valdovinos of this and Dr. Valdovinos was aware. Pt signed form to sign out AMA and accepts responsibility for any and all results of this decision.    58-year-old male, h/o non-compliance and poor f/u, and PMHx of hypothyroidism and recent ER admission 11/16/23 for BRPBR (Hg 9 at that time and pt instructed to f/u with GI as o/p, denies any since), who presented to [facility] ED 12/7/23 for medication refill for synthroid (frequent ER visit for med refills). In ED, pt lethargic and in AF w/ RVR (HR 170s). Pt successfully underwent DCCV in ED converting Atrial flutter to NSR and was admitted to cardiac tele for further management. EP consulted and recommended ablation, but pt refused to have conversation regarding atrial flutter and risk for stroke w/ EP team. Patient was evaluated by GI team reporting no further blood per rectum and colonoscopy 2 weeks ago; refused endoscopic procedure; per GI patient should be placed on cardiac medications that would benefit current presentation such as AC post DCCV. Patient was advised to start Eliquis 5mg BID as the patient agreed that the benefit of systemic embolization prevention outweighed risk of bleeding, especially now that the patient did not have any further clinical bleeding and GI was okay with continuing with AC. Patient was advised to undergo TTE to evaluate etiology of AFlutter and patient refusing stating he has to leave hospital and does not wish to stay for the study. Explained to patient risk of undetected cardiac abnormalities that confer risk of heart attack, stroke, or death; and patient expresses understanding of risks and exhibits decisional capacity to leave AMA. The patient left AMA so he was advised to f/u with GI, endocrine, and cardiology as outpatient within a week, and patient verbally promised he will.     Despite extensive discussion regarding the benefits of staying vs the risks associated with leaving against medical advice, patient has decided to leave against medical advice (AMA). Pt has a normal mental status (AOX3) and exhibits full decisional capacity. I explained the risks, benefits, and alternatives to treatment. The patient verbalized understanding of his condition and the risks of leaving AMA, including but not limited to:   -stroke  -heart attack  -permanent disability   -death     Pt had the opportunity to ask questions about his medical condition. Medical staff believes the patient fully understands what has been explained and answered. The patient has been informed that he may return for care at any time, and has been referred to her local medical physician for follow up ASAP; paperwork referrals given. I informed Dr. Valdovinos of this and Dr. Valdovinos was aware. Pt signed form to sign out AMA and accepts responsibility for any and all results of this decision.    58-year-old male, h/o non-compliance and poor f/u, and PMHx of hypothyroidism and recent ER admission 11/16/23 for BRPBR (Hg 9 at that time and pt instructed to f/u with GI as o/p, denies any since), who presented to [facility] ED 12/7/23 for medication refill for synthroid (frequent ER visit for med refills). In ED, pt lethargic and in AF w/ RVR (HR 170s). Pt successfully underwent DCCV in ED converting Atrial flutter to NSR and was admitted to cardiac tele for further management. EP consulted and recommended ablation, but pt refused to have conversation regarding atrial flutter and risk for stroke w/ EP team. Patient was evaluated by GI team reporting no further blood per rectum and colonoscopy 2 weeks ago; refused endoscopic procedure; per GI patient should be placed on cardiac medications that would benefit current presentation such as AC post DCCV. Patient was advised to start Eliquis 5mg BID as the patient agreed that the benefit of systemic embolization prevention outweighed risk of bleeding, especially now that the patient did not have any further clinical bleeding and GI was okay with continuing with AC. Patient was advised to undergo TTE to evaluate etiology of AFlutter and patient refusing stating he has to leave hospital and does not wish to stay for the study. Explained to patient risk of undetected cardiac abnormalities that confer risk of heart attack, stroke, or death; and patient expresses understanding of risks and exhibits decisional capacity to leave AMA. The patient left AMA so he was advised to f/u with GI, endocrine, and cardiology as outpatient within a week, and patient verbally promised he will.     Despite extensive discussion regarding the benefits of staying vs the risks associated with leaving against medical advice, patient has decided to leave against medical advice (AMA). Pt has a normal mental status (AOX3) and exhibits full decisional capacity. I explained the risks, benefits, and alternatives to treatment. The patient verbalized understanding of his condition and the risks of leaving AMA, including but not limited to:   -stroke  -heart attack  -permanent disability   -death     Pt had the opportunity to ask questions about his medical condition. Medical staff believes the patient fully understands what has been explained and answered. The patient has been informed that he may return for care at any time, and has been referred to her local medical physician for follow up ASAP; paperwork referrals given. I informed Dr. Valdovinos of this and Dr. Valdovinos was aware. Pt signed form to sign out AMA and accepts responsibility for any and all results of this decision.     All medications sent to [facility] including Eliquis;  patient understands high risk of stroke with interrupted AC and agrees to take AC as prescribed.    58-year-old male, h/o non-compliance and poor f/u, and PMHx of hypothyroidism and recent ER admission 11/16/23 for BRPBR (Hg 9 at that time and pt instructed to f/u with GI as o/p, denies any since), who presented to [facility] ED 12/7/23 for medication refill for synthroid (frequent ER visit for med refills). In ED, pt lethargic and in AF w/ RVR (HR 170s). Pt successfully underwent DCCV in ED converting Atrial flutter to NSR and was admitted to cardiac tele for further management. EP consulted and recommended ablation, but pt refused to have conversation regarding atrial flutter and risk for stroke w/ EP team. Patient was evaluated by GI team reporting no further blood per rectum and colonoscopy 2 weeks ago; refused endoscopic procedure; per GI patient should be placed on cardiac medications that would benefit current presentation such as AC post DCCV. Patient was advised to start Eliquis 5mg BID as the patient agreed that the benefit of systemic embolization prevention outweighed risk of bleeding, especially now that the patient did not have any further clinical bleeding and GI was okay with continuing with AC. Patient was advised to undergo TTE to evaluate etiology of AFlutter and patient refusing stating he has to leave hospital and does not wish to stay for the study. Explained to patient risk of undetected cardiac abnormalities that confer risk of heart attack, stroke, or death; and patient expresses understanding of risks and exhibits decisional capacity to leave AMA. The patient left AMA so he was advised to f/u with GI, endocrine, and cardiology as outpatient within a week, and patient verbally promised he will.     Despite extensive discussion regarding the benefits of staying vs the risks associated with leaving against medical advice, patient has decided to leave against medical advice (AMA). Pt has a normal mental status (AOX3) and exhibits full decisional capacity. I explained the risks, benefits, and alternatives to treatment. The patient verbalized understanding of his condition and the risks of leaving AMA, including but not limited to:   -stroke  -heart attack  -permanent disability   -death     Pt had the opportunity to ask questions about his medical condition. Medical staff believes the patient fully understands what has been explained and answered. The patient has been informed that he may return for care at any time, and has been referred to her local medical physician for follow up ASAP; paperwork referrals given. I informed Dr. Valdovinos of this and Dr. Valdovinos was aware. Pt signed form to sign out AMA and accepts responsibility for any and all results of this decision.     All medications sent to [facility] including Eliquis;  patient understands high risk of stroke with interrupted AC and agrees to take AC as prescribed.

## 2023-12-08 NOTE — DISCHARGE NOTE PROVIDER - CARE PROVIDER_API CALL
Jayson Noriega  Cardiac Electrophysiology  [address], [address] [place] [address]  Phone: [phone]  Fax: [phone]  Follow Up Time: 1 week   Jayson Noriega  Cardiac Electrophysiology  [address], [address] [place] [address]  Phone: [phone]  Fax: [phone]  Follow Up Time: 1 week   Jayson Noriega  Cardiac Electrophysiology  [address] [place] [address], [address] [place] [address]  Phone: [phone]  Fax: [phone]  Follow Up Time: 1 week    Gastroenterology,   [address] [place] [address] [address], [place] [address]  Phone: [phone]  Fax: (   )    -  Follow Up Time:    Jayson Noriega  Cardiac Electrophysiology  [address] [place] [address], [address] [place] [address]  Phone: [phone]  Fax: [phone]  Follow Up Time: 1 week    Gastroenterology,   [address] [place] [address] [address], [place] [address]  Phone: [phone]  Fax: (   )    -  Follow Up Time:

## 2023-12-08 NOTE — CONSULT NOTE ADULT - PROVIDER SPECIALTY LIST ADULT
Encounter to Establish Care      HISTORY:    The patient is a 72 year old female who comes in for an encounter to establish care. Fabby has the following medical issues:    Encounter to establish care  Fabby is a 72 year old female who presents to the clinic for an encounter to establish care.  She had been a patient of Kennedi Buchanan NP who retired earlier this year.  Fabby is currently retired from the [employer], and is .  Age of menarche onset was about 12.  She is  1 and para 1.  She has 2 grandchildren and 1 great-grandchild.  She does not have regular cycles because she had a hysterectomy about 7 years ago.  Hobbies include traveling, and she does no regular exercise program.  Typically she follows a regular diet, not eating healthy, drinking mostly water throughout the day.  She reports only social consumption of alcohol.  Fabby does not smoke.  She feels safe at home.  She denies recreational drug use.   She wears a seat belt every time she is in the car, and reports regular dental exams, and her last eye exam was within the past year.  She denies performing self breast exams.     Last 4 PHQ 2/9  PHQ 2:  Date Adult PHQ 2 Score Adult PHQ 2 Interpretation   2022 2 No further screening needed   10/6/2022 0 No further screening needed   10/6/2022 1 No further screening needed   2022 0 No further screening needed     PHQ 9:  Date Adult PHQ 9 Score Adult PHQ 9 Interpretation   2022 4 Minimal Depression     Health maintenance:  Recent labs reviewed with the patient today.    Impaired fasting glucose  - metFORMIN (GLUCOPHAGE-XR) 500 MG 24 hr tablet; Take 1 tablet by mouth daily (with breakfast).  Dispense: 90 tablet; Refill: 3  - COMPREHENSIVE METABOLIC PANEL; Future    Elevated fasting blood sugars for which she is on therapy with metformin 500 mg daily.  No episodes of hypoglycemia or loss of consciousness.  No side effects of medication such as nausea, vomiting or myalgias. 
 Compliance with medication is good.  Recent HgbA1c levels are   Hemoglobin A1C (%)   Date Value   09/13/2022 6.0 (H)   12/17/2020 5.5   12/05/2019 5.4   05/28/2019 5.5      Candidiasis of skin  - nystatin (MYCOSTATIN) 313625 UNIT/GM cream; Apply 1 application topically 2 times daily as needed (rash).  Dispense: 30 g; Refill: 11    She intermittently has issues with a rash under her breast and in her skin folds.  She uses nystatin for this, and notes that it is usually worse in the summer months.  She denies any issues at this time, but is requesting a refill.    Vitamin D insufficiency  - VITAMIN D -25 HYDROXY; Future    Vitamin D deficiency for which she is on vitamin D3 over the counter.  Fabby is tolerating this supplement without side effects or issues such as agitation, insomnia, pruritis, or any other concerns.  Fabby would like to continue with this current regimen.    Vitamin D, 25-Hydroxy (ng/mL)   Date Value   04/18/2022 54.9     Class 2 severe obesity due to excess calories with serious comorbidity and body mass index (BMI) of 37.0 to 37.9 in adult (CMS/HCC)  Metabolic syndrome  - VITAMIN D -25 HYDROXY; Future    Fabby continues to struggle with their weight.  The patient reports she really doesn't exercise and doesn't eat healthy.   Wt Readings from Last 4 Encounters:   11/08/22 102.5 kg (226 lb)   11/07/22 101.2 kg (223 lb)   10/06/22 102.8 kg (226 lb 10.1 oz)   09/27/22 105.2 kg (232 lb)     Hyperlipidemia, mixed  Coronary artery disease involving native coronary artery of native heart without angina pectoris  Chronic systolic heart failure (CMS/HCC)  History of blood clots  - isosorbide mononitrate (IMDUR) 30 MG 24 hr tablet; Take 1 tablet by mouth daily. Hold if SBP < 100  Dispense: 90 tablet; Refill: 1  - VITAMIN D -25 HYDROXY; Future    Hypercholesterolemia, history of non STEMI, coronary artery disease involving native coronary arteries of native heart without angina, chronic systolic heart 
Gastroenterology
failure, and history of blood clots for which she is on therapy with rosuvastatin 40 mg daily, carvedilol 25 mg twice daily, losartan 25 mg daily, spironolactone 25 mg daily, aspirin 81 mg daily, Eliquis 5 mg twice daily.  She typically follows up with Dr. Khan through [facility].  She had a massive heart attack about 5 years ago.  No side effects from therapy such as muscle aches, nausea or jaundice.  Compliance with low-cholesterol diet is good.  Compliance with medication is good.    She was recently hospitalized after getting lightheaded and falling.  She laid on the ground for about 10 hours before she could get to the phone.  She was found to have numerous blood clots that required cauterization and IVC filter.  She does not monitor her blood pressure at home, but questions if her blood pressure has been getting too low.  Most recent lipid panel shows   Cholesterol (mg/dL)   Date Value   03/31/2022 202 (H)     HDL (mg/dL)   Date Value   03/31/2022 46 (L)     Cholesterol/ HDL Ratio (no units)   Date Value   03/31/2022 4.4     Triglycerides (mg/dL)   Date Value   03/31/2022 166 (H)     LDL (mg/dL)   Date Value   03/31/2022 123     The 10-year ASCVD risk score (Chalo DK, et al., 2019) is: 9.7%    Values used to calculate the score:      Age: 72 years      Sex: Female      Is Non- : No      Diabetic: No      Tobacco smoker: No      Systolic Blood Pressure: 108 mmHg      Is BP treated: No      HDL Cholesterol: 46 mg/dL      Total Cholesterol: 202 mg/dL    Primary osteoarthritis involving multiple joints  Primary osteoarthritis of right knee  Primary osteoarthritis of multiple with a recent right knee replacement with Dr. Sterling 9/27/2022.  She is been doing well and rehabbing with physical therapy.  She is ambulating with a walker and denies any ongoing issues or concerns.    NEGIN (obstructive sleep apnea)  She indicates that she has had 2 sleep studies that showed she was a “shallow 
breather,\" she was given a CPAP and she tried it for about a month, but did not feel like it provided her with any benefit.  In reviewing the notes, it appears that she was diagnosed with mild sleep apnea, and they suggested that she may benefit from using a CPAP.  She has not been using it now for some time.    Low hemoglobin  - CBC WITH DIFFERENTIAL; Future    During her recent hospitalization where she had multiple blood clots she was found to be slightly anemic.  On 10/7/22 her hemoglobin was 10.6, then 10 8 it was 8.9, 10 9 it was 8.6, and 10 10 it was 9.2.  She is not currently on supplementation, but had had knee replacement surgery in the end of September.    Elevated liver enzymes  - COMPREHENSIVE METABOLIC PANEL; Future    During her hospital stay in October her ALT elevated.  On 10/6/2022 it was 69, and 10/8/2022 it was 91.  She denies any abdominal pain, change in stools, blood in the stools, nausea, constipation, vomiting or diarrhea.    Osteopenia of multiple sites  Post-menopausal  - alendronate (Fosamax) 70 MG tablet; Take 1 tablet by mouth every 7 days.  Dispense: 12 tablet; Refill: 3  - VITAMIN D -25 HYDROXY; Future    She is postmenopausal and diagnosed with osteopenia 11/23/2021.  She had been on alendronate, but stopped it because she was having dry mouth and was not sure if it was related to that medication.  It was found that Fosamax was not the cause of her dry mouth, so she is willing to restart it.    Urge incontinence of urine  - oxybutynin (DITROPAN-XL) 10 MG 24 hr tablet; Take 1 tablet by mouth daily.  Dispense: 90 tablet; Refill: 3    She has urinary urge incontinence that she is on oxybutynin 5 mg daily.  She still occasionally has issues and is willing to do a dose increase today.  She denies any side effects or concerns.  She does feel like after her hip surgery she developed this issue because of the catheter insertion, which she states they had issues doing. 
    MEDICATIONS:  Outpatient Medications Marked as Taking for the 11/8/22 encounter (Office Visit) with JUAN JOSE Juarez   Medication Sig Dispense Refill   • metFORMIN (GLUCOPHAGE-XR) 500 MG 24 hr tablet Take 1 tablet by mouth daily (with breakfast). 90 tablet 3   • nystatin (MYCOSTATIN) 010238 UNIT/GM cream Apply 1 application topically 2 times daily as needed (rash). 30 g 11   • isosorbide mononitrate (IMDUR) 30 MG 24 hr tablet Take 1 tablet by mouth daily. Hold if SBP < 100 90 tablet 1   • apixaBAN (Eliquis) 5 MG Tab Take 1 tablet by mouth every 12 hours. 90 tablet 1   • alendronate (Fosamax) 70 MG tablet Take 1 tablet by mouth every 7 days. 12 tablet 3   • aspirin 81 MG EC tablet Take 1 tablet by mouth daily. 90 tablet 3   • oxybutynin (DITROPAN-XL) 10 MG 24 hr tablet Take 1 tablet by mouth daily. 90 tablet 3   • losartan (COZAAR) 25 MG tablet Take 1 tablet by mouth daily. Hold for SBP< 110     • carvedilol (COREG) 25 MG tablet Take 1 tablet by mouth in the morning and 1 tablet in the evening. Take with meals. Hold for SBP< 95 or HR <50. 30 tablet 3   • rosuvastatin (CRESTOR) 40 MG tablet Take 40 mg by mouth daily.     • nitroGLYcerin (NITROSTAT) 0.4 MG sublingual tablet Place 1 tablet under the tongue every 5 minutes as needed for Chest pain (if after 3 doses chest pain continues,seek medical attention.). 25 tablet 11     ALLERGIES:  Allergies as of 11/08/2022 - Reviewed 11/08/2022   Allergen Reaction Noted   • Iodine   (environmental or med) HIVES 10/03/2013     HISTORIES:  Patient Active Problem List    Diagnosis Date Noted   • Anemia 10/06/2022     Priority: High   • Atypical chest pain 02/13/2018     Priority: Medium   • Acute bilateral deep vein thrombosis (DVT) of femoral veins (CMS/HCC) 10/10/2022     Priority: Low   • S/P insertion of IVC (inferior vena caval) filter 10/10/2022     Priority: Low   • Status post knee surgery 10/10/2022     Priority: Low   • Acute pulmonary embolism without acute 
Electrophysiology
cor pulmonale, unspecified pulmonary embolism type (CMS/HCC) 10/07/2022     Priority: Low   • Syncope and collapse 10/06/2022     Priority: Low   • Hypotension 10/06/2022     Priority: Low   • Primary osteoarthritis of right knee 09/27/2022     Priority: Low   • Chronic pain of right knee 05/12/2022     Priority: Low   • Malignant neoplasm of upper-outer quadrant of left breast in female, estrogen receptor positive (CMS/HCC) 08/09/2021     Priority: Low   • Prediabetes 01/03/2020     Priority: Low   • Metabolic syndrome 12/02/2019     Priority: Low   • Class 2 severe obesity due to excess calories with serious comorbidity and body mass index (BMI) of 36.0 to 36.9 in adult (CMS/HCC) 12/02/2019     Priority: Low   • Vitamin D insufficiency 12/02/2019     Priority: Low   • NEGIN (obstructive sleep apnea) 09/13/2019     Priority: Low   • Chronic fatigue 07/26/2019     Priority: Low   • S/P PTCA (percutaneous transluminal coronary angioplasty) 10/09/2017     Priority: Low   • Chronic systolic heart failure (CMS/HCC) 09/19/2017     Priority: Low     Post MI     • Coronary artery disease involving native coronary artery of native heart without angina pectoris 08/01/2017     Priority: Low     Conclusions 7/24/2017:  1. Late presentation of inferior STEMI which occurred 4 days prior to admission in [place] with residual ischemic pain related to total obstruction of the large dominant right coronary artery with insufficient retrograde collaterals.  2. Mild disease of the left anterior descending and circumflex with normal left main  3. Successful intervention and recanalization and stenting of the right coronary artery as described reestablishing MELISSA grade 3 flow reducing the stenosis to 0%.  4. Left ventriculogram showing moderate inferior hypokinesis with ejection fraction of 40-45%.  5. Left heart catheterization showing no gradient across aortic valve and left ventricular end-diastolic pressure 15 mmHg.    Conclusions 
10/2017: cath repeated for chest pain.  1. Diagnostic catheterization as described with mild stenosis at the crux of the previously placed tandem right coronary artery stents of 20% with improved flow to the PDA and posterior LV branch. There was moderate stenosis of the LAD distal to the diagonal branch.  2. Abnormal flow wire determination across the LAD distal to the diagonal. The IFR was abnormal at 0.86  3. Successful coronary intervention of the LAD with placement of a 2.5 x 14 mm resolute stent reducing the stenosis to 0% with MELISSA grade 3 flow before and after.  4. Left ventriculogram showing mild reduction of ejection fraction 45% with the inferior basal and mid inferior wall hypokinesis.     • Impaired fasting glucose      Priority: Low   • Peripheral neuropathy      Priority: Low   • Spinal stenosis of lumbar region with neurogenic claudication      Priority: Low     L3-4, L4-5, L5S1     • BPPV (benign paroxysmal positional vertigo)      Priority: Low   • DJD (degenerative joint disease)      Priority: Low     spine and lower extremities     • Asthma      Priority: Low   • Anxiety      Priority: Low   • Hyperlipidemia, mixed 03/17/2014     Priority: Low     Past Surgical History:   Procedure Laterality Date   • Biopsy of breast, incisional Left 08/04/2021    clip #3   • Breast lumpectomy Left    • Breast surgery     • Cardiac catherization  10/2017    July 2017 and October 2017 with PTCA and stents   • Cardiac surgery     • Colonoscopy  10/22/2008   • Colonoscopy w biopsy  05/07/2015   • Esophagogastroduodenoscopy  05/07/2015   • Eye surgery     • Fracture surgery     • Hysterectomy     • Ovarian cyst removal      right   • Removal gallbladder      Cholecystectomy (gallbladder)   • Robotic assisted hysterectomy  07/06/2015    BSO, SHAN   • Simple mastectomy Left 10/21/2021   • Skin biopsy     • Skin cancer excision      excision bcc   • Tonsillectomy     • Total hip replacement Right    • Total knee 
replacement Right    • Tubal ligation     • Vascular surgery       Family History   Problem Relation Age of Onset   • Cancer, Skin Melanoma Mother    • Stroke Mother         TIA   • Hypertension Mother    • Hyperlipidemia Mother    • Heart disease Father    • Cancer, Skin Sister    • Diabetes Brother    • Heart disease Brother         CABGx4, valve surgery   • Hyperlipidemia Brother    • Hypertension Brother    • Psychiatric Son    • Depression Son    • Cancer, Breast Maternal Grandmother         60s   • Postmenopausal breast cancer Maternal Aunt    • Cancer, Breast Maternal Aunt         late 60s   • Cancer, Breast Other         maternal great-aunt; dx late 70s     Social History     Occupational History   • Not on file   Tobacco Use   • Smoking status: Former Smoker     Packs/day: 1.00     Years: 20.00     Pack years: 20.00     Types: Cigarettes     Start date: 1967     Quit date: 1990     Years since quittin.8   • Smokeless tobacco: Never Used   Vaping Use   • Vaping Use: never used   Substance and Sexual Activity   • Alcohol use: Yes     Alcohol/week: 6.0 standard drinks     Types: 6 Glasses of wine per week     Comment: Occasional / Social   • Drug use: No   • Sexual activity: Not Currently     Partners: Male     REVIEW OF SYSTEMS:  -positives are bolded and noted at the top of the review of systems.  Remainder below negative.  Fatigue, weakness,  Constitutional:  Excessive fatigue, unexplained weight loss or gain, excessive sweating or night sweats.  Eyes:  Vision change, eye pain.   HENT:  Hearing loss, tinnitus, nasal obstruction or discharge, hoarseness, painful swallowing.  Respiratory:  Chronic cough, wheezing, shortness of breath.  Cardiovascular:  Chest pain, heart murmur, palpitations, claudication symptoms, ankle swelling.  Gastrointestinal:  Poor appetite, heartburn, black or bloody stools, recurrent abdominal pain, frequent nausea or vomiting, persistent constipation, persistent 
diarrhea.  Genitourinary:  Dysuria, nocturia, hematuria, urinary incontinence.  Endocrine:  Heat or cold intolerance, excessive thirst, excessive urination.  Musculoskeletal:  Swollen or painful joints, neck pain, back pain, morning stiffness greater than 15 minutes.  Integument:  Recurrent rash, changes in moles, abnormal hair growth.  Neurologic:  Headache, unexplained dizziness, loss of consciousness, head injury, focal weakness, sensory changes, tremors, difficulty talking, loss of balance, falls.  Lymphatic:  Swollen lymph nodes, easy bruising or bleeding, history of blood clots.  Psychiatric:  Insomnia, anxiety, irritability or mood swings, depression, suicidal thoughts, difficulty with concentration or memory.  Reproductive:  History of sexually transmitted infection, genital warts or HPV, more than 5 partners in lifetime, exposure to AIDS.    PHYSICAL EXAM  Vitals:  Blood pressure 108/62, pulse 84, resp. rate 16, height 5' 5.5\" (1.664 m), weight 102.5 kg (226 lb).  Constitutional:  Well developed, well nourished in no apparent distress, nontoxic appearance.   Respiratory:  No respiratory distress, normal breath sounds, no rales, wheezing or rhonchi noted.  Cardiovascular:  Normal rate, normal rhythm. No murmurs noted.   Neurologic:  Alert & oriented x 3.  Cranial nerves 3-12 normal, normal motor function, normal sensory function, no focal deficits noted. Gait normal.   Psychiatric:  Speech and behavior appropriate.  Patient cooperative.      ASSESSMENT/PLAN:    Discussed immunizations that are due (COVID-19 and Hep B), reviewed risk and benefits today.  Patient declines having them done today.  She would like to have the Pfizer booster so she will get it at the pharmacy.    Encounter to establish care  Fabby was welcomed to my practice and congratulated on the healthy habits she is continuing.  Healthy lifestyle recommendations were provided in today's after visit summary.    Health maintenance:  Recent 
labs reviewed with the patient today.    Impaired fasting glucose  Stable.   No changes to current treatment or intervention is required at this time, will continue metformin as noted below.     Refill:  - metFORMIN (GLUCOPHAGE-XR) 500 MG 24 hr tablet; Take 1 tablet by mouth daily (with breakfast).  Dispense: 90 tablet; Refill: 3  - COMPREHENSIVE METABOLIC PANEL; Future    Candidiasis of skin  Stable.   No changes to current treatment or intervention is required at this time, will continue nystatin as noted below.     Refill:  - nystatin (MYCOSTATIN) 110505 UNIT/GM cream; Apply 1 application topically 2 times daily as needed (rash).  Dispense: 30 g; Refill: 11    Vitamin D insufficiency  Stable.   No changes to current treatment or intervention is required at this time.     - VITAMIN D -25 HYDROXY; Future    Class 2 severe obesity due to excess calories with serious comorbidity and body mass index (BMI) of 36.0 to 36.9 in adult (CMS/HCC)  Metabolic syndrome  Uncontrolled.   Weight loss options were reviewed with the patient today.  Information was provided in today's after visit summary.      - VITAMIN D -25 HYDROXY; Future    Hyperlipidemia, mixed  Coronary artery disease involving native coronary artery of native heart without angina pectoris  Chronic systolic heart failure (CMS/HCC)  History of blood clots  Moderately controlled.  I do feel like her blood pressure is a little on the low side.  I suggested that we initially discontinue the spironolactone, but after some consideration due to her heart failure, I feel like decreasing the dose of the carvedilol is the better option.  She was educated on the fact that blood pressure monitoring at home is important as well as heart rate monitoring.  She will return in about a week for a blood pressure check.  She will follow-up with cardiology as previously planned and no other changes were made.   Side effects and use this medication including risk and benefits were 
reviewed with the patient today.     Refill:  - isosorbide mononitrate (IMDUR) 30 MG 24 hr tablet; Take 1 tablet by mouth daily. Hold if SBP < 100  Dispense: 90 tablet; Refill: 1  - VITAMIN D -25 HYDROXY; Future    Primary osteoarthritis involving multiple joints  Primary osteoarthritis of right knee  Stable.    No other treatment or intervention is required and she was encouraged to continue with physical therapy and follow-up with Orthopedics as planned.    NEGIN (obstructive sleep apnea)  Stable.   No treatment or intervention is required at this time.      Low hemoglobin  Awaiting results.   We discussed that her extreme fatigue could be related to this, so we will recheck a CBC to evaluate her status.    - CBC WITH DIFFERENTIAL; Future    Elevated liver enzymes  Awaiting results.   No treatment or intervention is required at this time.  Recommendations will be made once the labs are received.  We reviewed that this could have been a stress response due to her numerous issues in October.    - COMPREHENSIVE METABOLIC PANEL; Future    Osteopenia of multiple sites  Post-menopausal  Stable.   We will restart the alendronate as noted below.  We also discussed the importance of calcium and vitamin-D supplementation as well as weight-bearing exercise.  Side effects and use this medication including risk and benefits were reviewed with the patient today.     Restart:  - alendronate (Fosamax) 70 MG tablet; Take 1 tablet by mouth every 7 days.  Dispense: 12 tablet; Refill: 3  - VITAMIN D -25 HYDROXY; Future    Urge incontinence of urine  Moderately controlled.  We will do a dose increase of the oxybutynin as noted below.   Side effects and use this medication including risk and benefits were reviewed with the patient today.     Dose increase:  - oxybutynin (DITROPAN-XL) 10 MG 24 hr tablet; Take 1 tablet by mouth daily.  Dispense: 90 tablet; Refill: 3    All questions were answered to the best of my ability.  We reviewed 
signs and symptoms to monitor for when to notify me of any concerns or changes.  The patient agrees with the diagnosis and treatment plan of the aforementioned diagnoses, and denies any questions or concerns.      Return in about 6 months (around 5/8/2023) for Medicare wellness, BP check - nurse visit in 1 week.

## 2023-12-08 NOTE — DISCHARGE NOTE PROVIDER - CARE PROVIDERS DIRECT ADDRESSES
,[email or web address]

## 2023-12-08 NOTE — SBIRT NOTE ADULT - NSSBIRTBRIEFINTDET_GEN_A_CORE
Patient reports drinking 2-3 drinks a day after work. His drink of choice is "rumchata". He drinks alcohol to "cool off". Patient reports "trying to stop drinking". Patient reports a friend being concerned fro his drinking habits. SW offered resources and patient refused.

## 2023-12-08 NOTE — CONSULT NOTE ADULT - SUBJECTIVE AND OBJECTIVE BOX
HPI:  58M, h/o non compliance and poor f/u, and PMHx of hypothyroidism and recent ER admission 11/16/23 for BRPBR (Hg 9 at that time and pt instructed to f/u with GI as o/p, denies any since), who presented to [facility] ED 12/7/23 for medication refill for synthroid (frequent ER visit for med refills). In ED, pt lethargic and in AF w/ RVR (HR 170s). Pt successfully DCCV in ED and admitted to cardiac tele for further management. EP consulted and recommending ablation, however pt refused. Pt also refusing GI consult despite need for long term AC and Hgb 7.3 on admission and recent ED visit for BRBPR.  Ep consulted for further management of aflutter. At the time of consult, patient rate controlled.  Patient refusing consult or to speak with PA.     PAST MEDICAL & SURGICAL HISTORY:  Hypothyroid  Abscess  Polysubstance abuse  Rib fracture  w/ pneumothorax  H/O pneumothorax      No pertinent family history in first degree relatives    Family history of diabetes mellitus (Mother)    Family history of hypertension (Mother)    Social History: + PCP, + Marijuana   pertinent home medications:    Inpatient Medications:   apixaban 5 milliGRAM(s) Oral every 12 hours  ascorbic acid 500 milliGRAM(s) Oral daily  levothyroxine 175 MICROGram(s) Oral daily  metoprolol tartrate 25 milliGRAM(s) Oral three times a day  multivitamin 1 Tablet(s) Oral daily  nicotine -  14 mG/24Hr(s) Patch 1 Patch Transdermal daily      Allergies: vancomycin (Hives; Rash)  amoxicillin (Unknown)  Bactrim (Unknown)      ROS:   CONSTITUTIONAL: No fever, weight loss + fatigue  EYES: Pt denies  RESPIRATORY: No cough, wheezing, chills or hemoptysis; No Shortness of Breath  CARDIOVASCULAR: see HPI  GASTROINTESTINAL: Pt denies  NEUROLOGICAL: Pt denies  SKIN: Pt denies   PSYCHIATRIC: Pt denies  HEME/LYMPH: Pt denies    PHYSICAL:  T(C): 36.7 (12-08-23 @ 09:49), Max: 37.4 (12-07-23 @ 15:55)  HR: 87 (12-08-23 @ 09:49) (73 - 180)  BP: 131/69 (12-08-23 @ 09:49) (91/51 - 135/90)  RR: 17 (12-08-23 @ 09:49) (14 - 20)  SpO2: 99% (12-08-23 @ 09:49) (97% - 100%)  Wt(kg): --    Appearance: No acute distress, well developed, appearing agitated   Eyes: normal appearing conjunctiva, pupils and eyelids  Cardiovascular: Normal S1 S2, No JVD, No murmurs, No edema  Respiratory: Lungs clear to auscultation	bilaterally.  No wheeze, rhonchi, rales noted  Gastrointestinal:  Soft, NT/ND 	  Neurologic:  No deficit noted  Psych: A&Ox3, normal mood/affect  Musculoskeletal: normal gait  Skin: no rash noted, normal color and pigmentation.      LABS:                        8.2    8.05  )-----------( 615      ( 08 Dec 2023 05:30 )             26.9     12-08    136  |  105  |  18  ----------------------------<  107<H>  4.0   |  26  |  0.81    Ca    8.1<L>      08 Dec 2023 05:30  Mg     2.0     12-08    TPro  6.2  /  Alb  2.8<L>  /  TBili  <0.2  /  DBili  x   /  AST  34  /  ALT  106<H>  /  AlkPhos  70  12-07    PT/INR - ( 07 Dec 2023 19:52 )   PT: 11.3 sec;   INR: 0.99          PTT - ( 08 Dec 20 23 08:26 )  PTT:42.5 sec  TSH: 1.030   Troponin 26    EKG:    Telemetry:    ECHO:    Prior EP procedures:    Cath / stress / Cardiac CTa:    Assessment Plan:          HPI:  58M, h/o non compliance and poor f/u, and PMHx of hypothyroidism and recent ER admission 11/16/23 for BRPBR (Hg 9 at that time and pt instructed to f/u with GI as o/p, denies any since), who presented to [facility] ED 12/7/23 for medication refill for synthroid (frequent ER visit for med refills). In ED, pt lethargic and in AF w/ RVR (HR 170s). Pt successfully DCCV in ED and admitted to cardiac tele for further management. EP consulted and recommending ablation, however pt refused. Pt also refusing GI consult despite need for long term AC and Hgb 7.3 on admission and recent ED visit for BRBPR.  Ep consulted for further management of aflutter. At the time of consult, patient rate controlled.  Patient refusing consult or to speak with PA.     PAST MEDICAL & SURGICAL HISTORY:  Hypothyroid  Abscess  Polysubstance abuse  Rib fracture  w/ pneumothorax  H/O pneumothorax      No pertinent family history in first degree relatives    Family history of diabetes mellitus (Mother)    Family history of hypertension (Mother)    Social History: + PCP, + Marijuana   pertinent home medications:    Inpatient Medications:   apixaban 5 milliGRAM(s) Oral every 12 hours  ascorbic acid 500 milliGRAM(s) Oral daily  levothyroxine 175 MICROGram(s) Oral daily  metoprolol tartrate 25 milliGRAM(s) Oral three times a day  multivitamin 1 Tablet(s) Oral daily  nicotine -  14 mG/24Hr(s) Patch 1 Patch Transdermal daily      Allergies: vancomycin (Hives; Rash)  amoxicillin (Unknown)  Bactrim (Unknown)      ROS:   CONSTITUTIONAL: No fever, weight loss + fatigue  EYES: Pt denies  RESPIRATORY: No cough, wheezing, chills or hemoptysis; No Shortness of Breath  CARDIOVASCULAR: see HPI  GASTROINTESTINAL: Pt denies  NEUROLOGICAL: Pt denies  SKIN: Pt denies   PSYCHIATRIC: Pt denies  HEME/LYMPH: Pt denies    PHYSICAL:  T(C): 36.7 (12-08-23 @ 09:49), Max: 37.4 (12-07-23 @ 15:55)  HR: 87 (12-08-23 @ 09:49) (73 - 180)  BP: 131/69 (12-08-23 @ 09:49) (91/51 - 135/90)  RR: 17 (12-08-23 @ 09:49) (14 - 20)  SpO2: 99% (12-08-23 @ 09:49) (97% - 100%)  Wt(kg): --    Appearance: No acute distress, well developed, appearing agitated   Eyes: normal appearing conjunctiva, pupils and eyelids  Cardiovascular: Normal S1 S2, No JVD, No murmurs, No edema  Respiratory: Lungs clear to auscultation	bilaterally.  No wheeze, rhonchi, rales noted  Gastrointestinal:  Soft, NT/ND 	  Neurologic:  No deficit noted  Psych: A&Ox3, normal mood/affect  Musculoskeletal: normal gait  Skin: no rash noted, normal color and pigmentation.      LABS:                        8.2    8.05  )-----------( 615      ( 08 Dec 2023 05:30 )             26.9     12-08    136  |  105  |  18  ----------------------------<  107<H>  4.0   |  26  |  0.81    Ca    8.1<L>      08 Dec 2023 05:30  Mg     2.0     12-08    TPro  6.2  /  Alb  2.8<L>  /  TBili  <0.2  /  DBili  x   /  AST  34  /  ALT  106<H>  /  AlkPhos  70  12-07    PT/INR - ( 07 Dec 2023 19:52 )   PT: 11.3 sec;   INR: 0.99          PTT - ( 08 Dec 20 23 08:26 )  PTT:42.5 sec  TSH: 1.030   Troponin 26    EKG:    Telemetry:    ECHO:    Prior EP procedures:    Cath / stress / Cardiac CTa:    Assessment Plan:          HPI:  58M, h/o non compliance and poor f/u, and PMHx of hypothyroidism and recent ER admission 11/16/23 for BRPBR (Hg 9 at that time and pt instructed to f/u with GI as o/p, denies any since), who presented to [facility] ED 12/7/23 for medication refill for synthroid (frequent ER visit for med refills). In ED, pt lethargic and in AF w/ RVR (HR 170s). Pt successfully DCCV in ED and admitted to cardiac tele for further management. EP consulted and recommending ablation, however pt refused. Pt also refusing GI consult despite need for long term AC and Hgb 7.3 on admission and recent ED visit for BRBPR.  Ep consulted for further management of aflutter. At the time of consult, patient back in NSR.  Patient refusing consult or to speak with PA.     PAST MEDICAL & SURGICAL HISTORY:  Hypothyroid  Abscess  Polysubstance abuse  Rib fracture  w/ pneumothorax  H/O pneumothorax    No pertinent family history in first degree relatives    Family history of diabetes mellitus (Mother)    Family history of hypertension (Mother)    Social History: + PCP, + Marijuana   pertinent home medications:    Inpatient Medications:   apixaban 5 milliGRAM(s) Oral every 12 hours  ascorbic acid 500 milliGRAM(s) Oral daily  levothyroxine 175 MICROGram(s) Oral daily  metoprolol tartrate 25 milliGRAM(s) Oral three times a day  multivitamin 1 Tablet(s) Oral daily  nicotine -  14 mG/24Hr(s) Patch 1 Patch Transdermal daily      Allergies: vancomycin (Hives; Rash)  amoxicillin (Unknown)  Bactrim (Unknown)      ROS:   CONSTITUTIONAL: No fever, weight loss + fatigue  EYES: Pt denies  RESPIRATORY: No cough, wheezing, chills or hemoptysis; No Shortness of Breath  CARDIOVASCULAR: see HPI  GASTROINTESTINAL: Pt denies  NEUROLOGICAL: Pt denies  SKIN: Pt denies   PSYCHIATRIC: Pt denies  HEME/LYMPH: Pt denies    PHYSICAL:  T(C): 36.7 (12-08-23 @ 09:49), Max: 37.4 (12-07-23 @ 15:55)  HR: 87 (12-08-23 @ 09:49) (73 - 180)  BP: 131/69 (12-08-23 @ 09:49) (91/51 - 135/90)  RR: 17 (12-08-23 @ 09:49) (14 - 20)  SpO2: 99% (12-08-23 @ 09:49) (97% - 100%)  Wt(kg): --    Appearance: No acute distress, well developed, appearing agitated   Eyes: normal appearing conjunctiva, pupils and eyelids  Cardiovascular: Normal S1 S2, No JVD, No murmurs, No edema  Respiratory: Lungs clear to auscultation	bilaterally.  No wheeze, rhonchi, rales noted  Gastrointestinal:  Soft, NT/ND 	  Neurologic:  No deficit noted  Psych: A&Ox3, normal mood/affect  Musculoskeletal: normal gait  Skin: no rash noted, normal color and pigmentation.      LABS:                        8.2    8.05  )-----------( 615      ( 08 Dec 2023 05:30 )             26.9     12-08    136  |  105  |  18  ----------------------------<  107<H>  4.0   |  26  |  0.81    Ca    8.1<L>      08 Dec 2023 05:30  Mg     2.0     12-08    TPro  6.2  /  Alb  2.8<L>  /  TBili  <0.2  /  DBili  x   /  AST  34  /  ALT  106<H>  /  AlkPhos  70  12-07    PT/INR - ( 07 Dec 2023 19:52 )   PT: 11.3 sec;   INR: 0.99          PTT - ( 08 Dec 20 23 08:26 )  PTT:42.5 sec  TSH: 1.030   Troponin 26    EKG: Aflutter at 177 bpm, now in NSR     Telemetry:    ECHO:    Prior EP procedures:    Cath / stress / Cardiac CTa:    Assessment Plan:          HPI:  58M, h/o non compliance and poor f/u, and PMHx of hypothyroidism and recent ER admission 11/16/23 for BRPBR (Hg 9 at that time and pt instructed to f/u with GI as o/p, denies any since), who presented to [facility] ED 12/7/23 for medication refill for synthroid (frequent ER visit for med refills). In ED, pt lethargic and in AF w/ RVR (HR 170s). Pt successfully DCCV in ED and admitted to cardiac tele for further management. EP consulted and recommending ablation, however pt refused. Pt also refusing GI consult despite need for long term AC and Hgb 7.3 on admission and recent ED visit for BRBPR.  Ep consulted for further management of aflutter. At the time of consult, patient back in NSR.  Patient refusing consult or to speak with PA.     PAST MEDICAL & SURGICAL HISTORY:  Hypothyroid  Abscess  Polysubstance abuse  Rib fracture  w/ pneumothorax  H/O pneumothorax    No pertinent family history in first degree relatives    Family history of diabetes mellitus (Mother)    Family history of hypertension (Mother)    Social History: + PCP, + Marijuana   pertinent home medications:    Inpatient Medications:   apixaban 5 milliGRAM(s) Oral every 12 hours  ascorbic acid 500 milliGRAM(s) Oral daily  levothyroxine 175 MICROGram(s) Oral daily  metoprolol tartrate 25 milliGRAM(s) Oral three times a day  multivitamin 1 Tablet(s) Oral daily  nicotine -  14 mG/24Hr(s) Patch 1 Patch Transdermal daily      Allergies: vancomycin (Hives; Rash)  amoxicillin (Unknown)  Bactrim (Unknown)      ROS:   CONSTITUTIONAL: No fever, weight loss + fatigue  EYES: Pt denies  RESPIRATORY: No cough, wheezing, chills or hemoptysis; No Shortness of Breath  CARDIOVASCULAR: see HPI  GASTROINTESTINAL: Pt denies  NEUROLOGICAL: Pt denies  SKIN: Pt denies   PSYCHIATRIC: Pt denies  HEME/LYMPH: Pt denies    PHYSICAL:  T(C): 36.7 (12-08-23 @ 09:49), Max: 37.4 (12-07-23 @ 15:55)  HR: 87 (12-08-23 @ 09:49) (73 - 180)  BP: 131/69 (12-08-23 @ 09:49) (91/51 - 135/90)  RR: 17 (12-08-23 @ 09:49) (14 - 20)  SpO2: 99% (12-08-23 @ 09:49) (97% - 100%)  Wt(kg): --    Appearance: No acute distress, well developed, appearing agitated   Eyes: normal appearing conjunctiva, pupils and eyelids  Cardiovascular: Normal S1 S2, No JVD, No murmurs, No edema  Respiratory: Lungs clear to auscultation	bilaterally.  No wheeze, rhonchi, rales noted  Gastrointestinal:  Soft, NT/ND 	  Neurologic:  No deficit noted  Psych: A&Ox3, normal mood/affect  Musculoskeletal: normal gait  Skin: no rash noted, normal color and pigmentation.      LABS:                        8.2    8.05  )-----------( 615      ( 08 Dec 2023 05:30 )             26.9     12-08    136  |  105  |  18  ----------------------------<  107<H>  4.0   |  26  |  0.81    Ca    8.1<L>      08 Dec 2023 05:30  Mg     2.0     12-08    TPro  6.2  /  Alb  2.8<L>  /  TBili  <0.2  /  DBili  x   /  AST  34  /  ALT  106<H>  /  AlkPhos  70  12-07    PT/INR - ( 07 Dec 2023 19:52 )   PT: 11.3 sec;   INR: 0.99          PTT - ( 08 Dec 20 23 08:26 )  PTT:42.5 sec  TSH: 1.030   Troponin 26    EKG: Aflutter at 177 bpm, now in NSR     Telemetry:    ECHO:    Prior EP procedures:    Cath / stress / Cardiac CTa:    Assessment Plan:          HPI:  58M, h/o non compliance and poor f/u, and PMHx of hypothyroidism and recent ER admission 11/16/23 for BRPBR (Hg 9 at that time and pt instructed to f/u with GI as o/p, denies any since), who presented to [facility] ED 12/7/23 for medication refill for synthroid (frequent ER visit for med refills). In ED, pt lethargic and in AF w/ RVR (HR 170s). Pt successfully DCCV in ED and admitted to cardiac tele for further management. EP consulted and recommending ablation, however pt refused. Pt also refusing GI consult despite need for long term AC and Hgb 7.3 on admission and recent ED visit for BRBPR.  Ep consulted for further management of aflutter. At the time of consult, patient back in NSR.  Patient refusing consult or to speak with PA.     PAST MEDICAL & SURGICAL HISTORY:  Hypothyroid  Abscess  Polysubstance abuse  Rib fracture  w/ pneumothorax  H/O pneumothorax    No pertinent family history in first degree relatives    Family history of diabetes mellitus (Mother)    Family history of hypertension (Mother)    Social History: + PCP, + Marijuana   pertinent home medications:    Inpatient Medications:   apixaban 5 milliGRAM(s) Oral every 12 hours  ascorbic acid 500 milliGRAM(s) Oral daily  levothyroxine 175 MICROGram(s) Oral daily  metoprolol tartrate 25 milliGRAM(s) Oral three times a day  multivitamin 1 Tablet(s) Oral daily  nicotine -  14 mG/24Hr(s) Patch 1 Patch Transdermal daily      Allergies: vancomycin (Hives; Rash)  amoxicillin (Unknown)  Bactrim (Unknown)      ROS:   CONSTITUTIONAL: No fever, weight loss + fatigue  EYES: Pt denies  RESPIRATORY: No cough, wheezing, chills or hemoptysis; No Shortness of Breath  CARDIOVASCULAR: see HPI  GASTROINTESTINAL: Pt denies  NEUROLOGICAL: Pt denies  SKIN: Pt denies   PSYCHIATRIC: Pt denies  HEME/LYMPH: Pt denies    PHYSICAL:  T(C): 36.7 (12-08-23 @ 09:49), Max: 37.4 (12-07-23 @ 15:55)  HR: 87 (12-08-23 @ 09:49) (73 - 180)  BP: 131/69 (12-08-23 @ 09:49) (91/51 - 135/90)  RR: 17 (12-08-23 @ 09:49) (14 - 20)  SpO2: 99% (12-08-23 @ 09:49) (97% - 100%)  Wt(kg): --    Appearance: No acute distress, well developed, appearing agitated   Eyes: normal appearing conjunctiva, pupils and eyelids  Cardiovascular: Normal S1 S2, No JVD, No murmurs, No edema  Respiratory: Lungs clear to auscultation	bilaterally.  No wheeze, rhonchi, rales noted  Gastrointestinal:  Soft, NT/ND 	  Neurologic:  No deficit noted  Psych: A&Ox3, normal mood/affect  Musculoskeletal: normal gait  Skin: no rash noted, normal color and pigmentation.      LABS:                        8.2    8.05  )-----------( 615      ( 08 Dec 2023 05:30 )             26.9     12-08    136  |  105  |  18  ----------------------------<  107<H>  4.0   |  26  |  0.81    Ca    8.1<L>      08 Dec 2023 05:30  Mg     2.0     12-08    TPro  6.2  /  Alb  2.8<L>  /  TBili  <0.2  /  DBili  x   /  AST  34  /  ALT  106<H>  /  AlkPhos  70  12-07    PT/INR - ( 07 Dec 2023 19:52 )   PT: 11.3 sec;   INR: 0.99          PTT - ( 08 Dec 20 23 08:26 )  PTT:42.5 sec  TSH: 1.030   Troponin 26    EKG: Aflutter at 177 bpm, now in NSR     Telemetry: NSR @ 90s-100s bpm     ECHO: pending     Assessment Plan:  58M, h/o non compliance and poor f/u, and PMHx of hypothyroidism and recent ER admission 11/16/23 for BRPBR (Hg 9 at that time and pt instructed to f/u with GI as o/p, denies any since), who presented to [facility] ED 12/7/23 for medication refill for synthroid (frequent ER visit for med refills). In ED, pt lethargic and in AF w/ RVR (HR 170s). Pt successfully DCCV in ED and admitted to cardiac tele for further management. EP consulted and recommending ablation, however pt refused. Pt also refusing GI consult despite need for long term AC and Hgb 7.3 on admission and recent ED visit for BRBPR.  Ep consulted for further management of aflutter. At the time of consult, patient back in NSR.  Patient refusing consult or to speak with PA.     - Patient refusing to have conversation regarding his atrial flutter and risk for stroke. Discussed both rhythm, rate control strategies and option for ablation. Patient absolutely refusing to speak with PA about any sort of procedure.   - Patient educated that he needs to continue taking his Eliquis 5 mg twice daily to prevent stroke.          HPI:  58M, h/o non compliance and poor f/u, and PMHx of hypothyroidism and recent ER admission 11/16/23 for BRPBR (Hg 9 at that time and pt instructed to f/u with GI as o/p, denies any since), who presented to [facility] ED 12/7/23 for medication refill for synthroid (frequent ER visit for med refills). In ED, pt lethargic and in AF w/ RVR (HR 170s). Pt successfully DCCV in ED and admitted to cardiac tele for further management. EP consulted and recommending ablation, however pt refused. Pt also refusing GI consult despite need for long term AC and Hgb 7.3 on admission and recent ED visit for BRBPR.  Ep consulted for further management of aflutter. At the time of consult, patient back in NSR.  Patient refusing consult or to speak with PA.     PAST MEDICAL & SURGICAL HISTORY:  Hypothyroid  Abscess  Polysubstance abuse  Rib fracture  w/ pneumothorax  H/O pneumothorax    No pertinent family history in first degree relatives    Family history of diabetes mellitus (Mother)    Family history of hypertension (Mother)    Social History: + PCP, + Marijuana   pertinent home medications:    Inpatient Medications:   apixaban 5 milliGRAM(s) Oral every 12 hours  ascorbic acid 500 milliGRAM(s) Oral daily  levothyroxine 175 MICROGram(s) Oral daily  metoprolol tartrate 25 milliGRAM(s) Oral three times a day  multivitamin 1 Tablet(s) Oral daily  nicotine -  14 mG/24Hr(s) Patch 1 Patch Transdermal daily      Allergies: vancomycin (Hives; Rash)  amoxicillin (Unknown)  Bactrim (Unknown)      ROS:   CONSTITUTIONAL: No fever, weight loss + fatigue  EYES: Pt denies  RESPIRATORY: No cough, wheezing, chills or hemoptysis; No Shortness of Breath  CARDIOVASCULAR: see HPI  GASTROINTESTINAL: Pt denies  NEUROLOGICAL: Pt denies  SKIN: Pt denies   PSYCHIATRIC: Pt denies  HEME/LYMPH: Pt denies    PHYSICAL:  T(C): 36.7 (12-08-23 @ 09:49), Max: 37.4 (12-07-23 @ 15:55)  HR: 87 (12-08-23 @ 09:49) (73 - 180)  BP: 131/69 (12-08-23 @ 09:49) (91/51 - 135/90)  RR: 17 (12-08-23 @ 09:49) (14 - 20)  SpO2: 99% (12-08-23 @ 09:49) (97% - 100%)  Wt(kg): --    Appearance: No acute distress, well developed, appearing agitated   Eyes: normal appearing conjunctiva, pupils and eyelids  Cardiovascular: Normal S1 S2, No JVD, No murmurs, No edema  Respiratory: Lungs clear to auscultation	bilaterally.  No wheeze, rhonchi, rales noted  Gastrointestinal:  Soft, NT/ND 	  Neurologic:  No deficit noted  Psych: A&Ox3, normal mood/affect  Musculoskeletal: normal gait  Skin: no rash noted, normal color and pigmentation.      LABS:                        8.2    8.05  )-----------( 615      ( 08 Dec 2023 05:30 )             26.9     12-08    136  |  105  |  18  ----------------------------<  107<H>  4.0   |  26  |  0.81    Ca    8.1<L>      08 Dec 2023 05:30  Mg     2.0     12-08    TPro  6.2  /  Alb  2.8<L>  /  TBili  <0.2  /  DBili  x   /  AST  34  /  ALT  106<H>  /  AlkPhos  70  12-07    PT/INR - ( 07 Dec 2023 19:52 )   PT: 11.3 sec;   INR: 0.99          PTT - ( 08 Dec 20 23 08:26 )  PTT:42.5 sec  TSH: 1.030   Troponin 26    EKG: Aflutter at 177 bpm, now in NSR     Telemetry: NSR @ 90s-100s bpm     ECHO: pending     Assessment Plan:  58M, h/o non compliance and poor f/u, and PMHx of hypothyroidism and recent ER admission 11/16/23 for BRPBR (Hg 9 at that time and pt instructed to f/u with GI as o/p, denies any since), who presented to [facility] ED 12/7/23 for medication refill for synthroid (frequent ER visit for med refills). In ED, pt lethargic and in AF w/ RVR (HR 170s). Pt successfully DCCV in ED and admitted to cardiac tele for further management. EP consulted and recommending ablation, however pt refused. Pt also refusing GI consult despite need for long term AC and Hgb 7.3 on admission and recent ED visit for BRBPR.  Ep consulted for further management of aflutter. At the time of consult, patient back in NSR.  Patient refusing consult or to speak with PA.     - Patient refusing to have conversation regarding his atrial flutter and risk for stroke. Discussed both rhythm, rate control strategies and option for ablation. Patient absolutely refusing to speak with PA about any sort of procedure.   - Patient educated that he needs to continue taking his Eliquis 5 mg twice daily to prevent stroke.

## 2023-12-08 NOTE — DISCHARGE NOTE PROVIDER - NSDCCPCAREPLAN_GEN_ALL_CORE_FT
PRINCIPAL DISCHARGE DIAGNOSIS  Diagnosis: Atrial flutter with rapid ventricular response  Assessment and Plan of Treatment: You have an abnormal heart rhythm (arrhythmia) called atrial fluttter. With this condition, the hearts 2 upper chambers (the atria) quiver rather than squeeze the blood out in a normal pattern. This leads to an irregular and sometimes rapid heartbeat. Atrial flutter is serious condition as it affects the heart’s ability to fill with blood as it should and blood clots may form, which increases the risk for stroke.  -Please CONTINUE ELIQUIS 5MG TWICE A DAY to prevent a stroke.  -Please START taking Metoprolol Tartrate 25 mg every 8 hours to keep your heart rate regular  -Please follow up with electrophysiology. Please schedule an appointment by calling our Electrophysiology department at [phone].   - Please monitor for bleeding. If there are any new or concerning symptoms, including bleeding, please return to the ER for further evaluation.  - You underwent an echocardiogram of your heart however you did not stay for the results; this was against our medical advice. For general cardiology follow up please call [phone] to set up appointment with first available cardiologist.      SECONDARY DISCHARGE DIAGNOSES  Diagnosis: Hypothyroidism  Assessment and Plan of Treatment: You have an underactive thyroid. Please continue your home synthroid 175 mcg daily. Please follow up with your primary care physician or endocrinologist for further care including repeat thyroid function studies and possible dose adjustment of your medication.    Diagnosis: Anemia  Assessment and Plan of Treatment: Your blood count was found to be low. You need to see a gastroenterologist to check your blood count and to determine if you are able to take an anticoagulation medication called Eliquis. Please follow up with your GI doctor for further evaluation.  Please follow up with a gastroenterologist as discussed. Please call [phone] to reach our coordinator to set up this appointment and to consider endoscopy/colonoscopy.   You should return to the Emergency Department if you feel any new/worsening/persistent symptoms including but not limited to: fever, chills, persistent bleeding, chest pain, difficulty breathing, loss of consciousness, uncontrolled pain, numbness/weakness of a body part or with any concerning symptoms.     PRINCIPAL DISCHARGE DIAGNOSIS  Diagnosis: Atrial flutter with rapid ventricular response  Assessment and Plan of Treatment: You have an abnormal heart rhythm (arrhythmia) called atrial fluttter. With this condition, the hearts 2 upper chambers (the atria) quiver rather than squeeze the blood out in a normal pattern. This leads to an irregular and sometimes rapid heartbeat. Atrial flutter is serious condition as it affects the heart’s ability to fill with blood as it should and blood clots may form, which increases the risk for stroke.  -Please CONTINUE ELIQUIS 5MG TWICE A DAY to prevent a stroke.  -Please START taking Metoprolol Tartrate 25 mg every 8 hours to keep your heart rate regular  -Please follow up with electrophysiology. Please schedule an appointment by calling our Electrophysiology department at [phone].   - Please monitor for bleeding. If there are any new or concerning symptoms, including bleeding, please return to the ER for further evaluation.  - You underwent an echocardiogram of your heart however you did not stay for the results; this was against our medical advice. For general cardiology follow up please call [phone] to set up appointment with first available cardiologist.      SECONDARY DISCHARGE DIAGNOSES  Diagnosis: Hypothyroidism  Assessment and Plan of Treatment: You have an underactive thyroid. Please continue your home synthroid 175 mcg daily. Please follow up with your primary care physician or endocrinologist for further care including repeat thyroid function studies and possible dose adjustment of your medication.    Diagnosis: Anemia  Assessment and Plan of Treatment: Your blood count was found to be low. You need to see a gastroenterologist to check your blood count and to determine if you are able to take an anticoagulation medication called Eliquis. Please follow up with your GI doctor for further evaluation.  Please follow up with a gastroenterologist as discussed. Please call [phone] to reach our coordinator to set up this appointment and to consider endoscopy/colonoscopy.   You should return to the Emergency Department if you feel any new/worsening/persistent symptoms including but not limited to: fever, chills, persistent bleeding, chest pain, difficulty breathing, loss of consciousness, uncontrolled pain, numbness/weakness of a body part or with any concerning symptoms.     PRINCIPAL DISCHARGE DIAGNOSIS  Diagnosis: Atrial flutter with rapid ventricular response  Assessment and Plan of Treatment: You have an abnormal heart rhythm (arrhythmia) called atrial fluttter. With this condition, the hearts 2 upper chambers (the atria) quiver rather than squeeze the blood out in a normal pattern. This leads to an irregular and sometimes rapid heartbeat. Atrial flutter is serious condition as it affects the heart’s ability to fill with blood as it should and blood clots may form, which increases the risk for stroke.  -Please CONTINUE ELIQUIS 5MG TWICE A DAY to prevent a stroke.  -Please START taking Metoprolol Tartrate 25 mg every 8 hours to keep your heart rate regular  -Please follow up with electrophysiology. Please schedule an appointment by calling our Electrophysiology department at [phone].   - Please monitor for bleeding. If there are any new or concerning symptoms, including bleeding, please return to the ER for further evaluation.  - You were recommended for an echocardiogram of your heart however you did not stay to undergo the test; this was against our medical advice. For general cardiology follow up please call [phone] to set up an appointment with first available cardiologist.      SECONDARY DISCHARGE DIAGNOSES  Diagnosis: Hypothyroidism  Assessment and Plan of Treatment: You have an underactive thyroid. Please continue your home synthroid 175 mcg daily. Please follow up with your primary care physician or endocrinologist for further care including repeat thyroid function studies and possible dose adjustment of your medication.    Diagnosis: Anemia  Assessment and Plan of Treatment: Your blood count was found to be low and were seen by a gastroenterologist in the hospital who cleared you to begin taking Eliquis. Please follow up with a GI doctor for further evaluation. Please continue taking pantoprazole 40 mg once a day.  Please follow up with a gastroenterologist as discussed. Please call [phone] to reach our coordinator to set up this appointment and to consider endoscopy/colonoscopy.   You should return to the Emergency Department if you feel any new/worsening/persistent symptoms including but not limited to: fever, chills, persistent bleeding, chest pain, difficulty breathing, loss of consciousness, uncontrolled pain, numbness/weakness of a body part or with any concerning symptoms.     PRINCIPAL DISCHARGE DIAGNOSIS  Diagnosis: Atrial flutter with rapid ventricular response  Assessment and Plan of Treatment: You have an abnormal heart rhythm (arrhythmia) called atrial fluttter. With this condition, the hearts 2 upper chambers (the atria) quiver rather than squeeze the blood out in a normal pattern. This leads to an irregular and sometimes rapid heartbeat. Atrial flutter is serious condition as it affects the heart’s ability to fill with blood as it should and blood clots may form, which increases the risk for stroke.  -Please CONTINUE ELIQUIS 5MG TWICE A DAY to prevent a stroke.  -Please START taking Metoprolol Tartrate 25 mg every 8 hours to keep your heart rate regular  -Please follow up with electrophysiology. Please schedule an appointment by calling our Electrophysiology department at [phone].   - Please monitor for bleeding. If there are any new or concerning symptoms, including bleeding, please return to the ER for further evaluation.  - You were recommended for an echocardiogram of your heart however you did not stay to undergo the test; this was against our medical advice. For general cardiology follow up please call [phone] to set up an appointment with first available cardiologist.      SECONDARY DISCHARGE DIAGNOSES  Diagnosis: Hypothyroidism  Assessment and Plan of Treatment: You have an underactive thyroid. Please continue your home synthroid 175 mcg daily. Please follow up with your primary care physician or endocrinologist for further care including repeat thyroid function studies and possible dose adjustment of your medication.    Diagnosis: Anemia  Assessment and Plan of Treatment: Your blood count was found to be low and were seen by a gastroenterologist in the hospital who cleared you to begin taking Eliquis. Please follow up with a GI doctor for further evaluation. Please continue taking pantoprazole 40 mg once a day.  Please follow up with a gastroenterologist as discussed. Please call [phone] to reach our coordinator to set up this appointment and to consider endoscopy/colonoscopy.   You should return to the Emergency Department if you feel any new/worsening/persistent symptoms including but not limited to: fever, chills, persistent bleeding, chest pain, difficulty breathing, loss of consciousness, uncontrolled pain, numbness/weakness of a body part or with any concerning symptoms.

## 2023-12-12 DIAGNOSIS — D64.9 ANEMIA, UNSPECIFIED: ICD-10-CM

## 2023-12-12 DIAGNOSIS — F17.200 NICOTINE DEPENDENCE, UNSPECIFIED, UNCOMPLICATED: ICD-10-CM

## 2023-12-12 DIAGNOSIS — E03.9 HYPOTHYROIDISM, UNSPECIFIED: ICD-10-CM

## 2023-12-12 DIAGNOSIS — R00.0 TACHYCARDIA, UNSPECIFIED: ICD-10-CM

## 2023-12-12 DIAGNOSIS — Z53.20 PROCEDURE AND TREATMENT NOT CARRIED OUT BECAUSE OF PATIENT'S DECISION FOR UNSPECIFIED REASONS: ICD-10-CM

## 2023-12-12 DIAGNOSIS — I10 ESSENTIAL (PRIMARY) HYPERTENSION: ICD-10-CM

## 2023-12-12 DIAGNOSIS — Z91.199 PATIENT'S NONCOMPLIANCE WITH OTHER MEDICAL TREATMENT AND REGIMEN DUE TO UNSPECIFIED REASON: ICD-10-CM

## 2023-12-12 DIAGNOSIS — F12.99 CANNABIS USE, UNSPECIFIED WITH UNSPECIFIED CANNABIS-INDUCED DISORDER: ICD-10-CM

## 2023-12-12 DIAGNOSIS — I48.92 UNSPECIFIED ATRIAL FLUTTER: ICD-10-CM

## 2023-12-12 DIAGNOSIS — Z88.1 ALLERGY STATUS TO OTHER ANTIBIOTIC AGENTS STATUS: ICD-10-CM

## 2023-12-12 DIAGNOSIS — Z79.890 HORMONE REPLACEMENT THERAPY: ICD-10-CM

## 2023-12-12 DIAGNOSIS — I95.9 HYPOTENSION, UNSPECIFIED: ICD-10-CM

## 2023-12-24 ENCOUNTER — EMERGENCY (EMERGENCY)
Facility: HOSPITAL | Age: 58
LOS: 1 days | Discharge: HOME | End: 2023-12-24
Attending: EMERGENCY MEDICINE | Admitting: EMERGENCY MEDICINE
Payer: MEDICAID

## 2023-12-24 VITALS
DIASTOLIC BLOOD PRESSURE: 79 MMHG | TEMPERATURE: 99 F | SYSTOLIC BLOOD PRESSURE: 124 MMHG | OXYGEN SATURATION: 96 % | WEIGHT: 176.37 LBS | HEIGHT: 68 IN | RESPIRATION RATE: 18 BRPM | HEART RATE: 100 BPM

## 2023-12-24 DIAGNOSIS — Z87.09 PERSONAL HISTORY OF OTHER DISEASES OF THE RESPIRATORY SYSTEM: Chronic | ICD-10-CM

## 2023-12-24 DIAGNOSIS — S22.39XA FRACTURE OF ONE RIB, UNSPECIFIED SIDE, INITIAL ENCOUNTER FOR CLOSED FRACTURE: Chronic | ICD-10-CM

## 2023-12-24 PROCEDURE — 99283 EMERGENCY DEPT VISIT LOW MDM: CPT

## 2023-12-24 PROCEDURE — 99284 EMERGENCY DEPT VISIT MOD MDM: CPT | Mod: 25

## 2023-12-24 RX ORDER — IBUPROFEN 200 MG
600 TABLET ORAL ONCE
Refills: 0 | Status: COMPLETED | OUTPATIENT
Start: 2023-12-24 | End: 2023-12-24

## 2023-12-24 RX ADMIN — Medication 600 MILLIGRAM(S): at 07:33

## 2023-12-24 NOTE — ED PROVIDER NOTE - CLINICAL SUMMARY MEDICAL DECISION MAKING FREE TEXT BOX
58M with a h/o hypothyroidism, aflutter who p/w b/l LE swelling and pain. Pt denies trauma or fall, no f/c, no cp/sob. Pt also asking for food.   VSS, no sig swelling, clinically picture not c/f cellulitis or dvt. Recommend leg elevation and compression socks. Pt fels better after eating and now stable for DC. Pt feeling improved and is stable for DC. ED evaluation and management discussed with the patient in detail.  Close PMD follow up encouraged.  Strict ED return instructions discussed in detail and patient given the opportunity to ask any questions about their discharge diagnosis and instructions. Patient verbalized understanding.

## 2023-12-24 NOTE — ED PROVIDER NOTE - OBJECTIVE STATEMENT
58M with a h/o hypothyroidism, aflutter who p/w b/l LE swelling and pain. Pt denies trauma or fall, no f/c, no cp/sob. Pt also asking for food.

## 2023-12-24 NOTE — ED ADULT NURSE NOTE - NSFALLUNIVINTERV_ED_ALL_ED
Bed/Stretcher in lowest position, wheels locked, appropriate side rails in place/Call bell, personal items and telephone in reach/Instruct patient to call for assistance before getting out of bed/chair/stretcher/Non-slip footwear applied when patient is off stretcher/[place] to call system/Physically safe environment - no spills, clutter or unnecessary equipment/Purposeful proactive rounding/Room/bathroom lighting operational, light cord in reach Bed/Stretcher in lowest position, wheels locked, appropriate side rails in place/Call bell, personal items and telephone in reach/Instruct patient to call for assistance before getting out of bed/chair/stretcher/Non-slip footwear applied when patient is off stretcher/[place] to call system/Physically safe environment - no spills, clutter or unnecessary equipment/Purposeful proactive rounding/Room/bathroom lighting operational, light cord in reach

## 2023-12-24 NOTE — ED PROVIDER NOTE - PATIENT PORTAL LINK FT
You can access the FollowMyHealth Patient Portal offered by [facility] by registering at the following website: [email or web address]. By joining [employer]’s FollowMyHealth portal, you will also be able to view your health information using other applications (apps) compatible with our system. You can access the FollowMyHealth Patient Portal offered by [facility] by registering at the following website: [email or web address]. By joining "[employer]’s FollowMyHealth portal, you will also be able to view your health information using other applications (apps) compatible with our system.

## 2023-12-24 NOTE — ED PROVIDER NOTE - PHYSICAL EXAMINATION
GEN: Well appearing, well developed, awake, alert, oriented to person, place, time/situation and in no apparent distress. NTAF  ENT: Airway patent, Nasal mucosa clear. Mouth with normal mucosa.  EYES: Clear bilaterally. PERRL, EOMI  RESPIRATORY: Breathing comfortably with normal RR.    CARDIAC: Regular rate and rhythm, no M/R/G  ABDOMEN: Soft, nontender,    MSK: Range of motion is not limited, no deformities noted. Trace b/l LE edema.   NEURO: Alert and oriented, no focal deficits.  SKIN: Skin normal color for race, warm, dry and intact. No evidence of rash.  PSYCH: Alert and oriented to person, place, time/situation. normal mood and affect. no apparent risk to self or others.

## 2023-12-24 NOTE — ED ADULT TRIAGE NOTE - CHIEF COMPLAINT QUOTE
bilateral lower leg pain and swelling for couple of days bilateral lower leg pain and back pain for couple of days

## 2023-12-24 NOTE — ED PROVIDER NOTE - NSFOLLOWUPINSTRUCTIONS_ED_ALL_ED_FT
1. You were seen for  lg pain. Take motrin or tylenol as needed for pain. Keep legs elevated, wear compression socks   . A copy of any of your resulted labs, imaging, and findings have been provided to you. Make sure to view any test results that may not have yet resulted at the time of your discharge by creating a FollowMyHealth account at: [email or web address] to sign up for FollowMyHealth.   2. Continue to take your home medications as prescribed.   3. Please follow up with your primary care physician. You may call our referrals coordinator at [phone] Monday to Friday 11am-7pm for assistance with making an appointment. Or you can call [phone] to make an appointment.  4. Return to the emergency department for new, persistent, or worsening symptoms or signs, or for any concerning symptoms.   5. For your for health, you should make healthy food choices and be physically active. Also, you should not smoke or use drugs, and you should not drink alcohol in excess. Please visit [email or web address]/[email or web address] for resources and more information.    Chronic Pain    Chronic pain is a complex condition and the Emergency Department is not the ideal place to manage this condition. Prescription painkillers must be written by your primary care provider or a pain management physician. Avoid activities or triggers that exacerbate your pain.    SEEK IMMEDIATE MEDICAL CARE IF YOU HAVE ANY OF THE FOLLOWING SYMPTOMS: severe chest pain, fainting, vomiting blood, dark or bloody stools, or pain different from your chronic pain. 1. You were seen for  lg pain. Take motrin or tylenol as needed for pain. Keep legs elevated, wear compression socks   . A copy of any of your resulted labs, imaging, and findings have been provided to you. Make sure to view any test results that may not have yet resulted at the time of your discharge by creating a FollowMyHealth account at: [email or web address] to sign up for FollowMyHealth.   2. Continue to take your home medications as prescribed.   3. Please follow up with your primary care physician. You may call our referrals coordinator at [phone] Monday to Friday 11am-7pm for assistance with making an appointment. Or you can call [phone] to make an appointment.  4. Return to the emergency department for new, persistent, or worsening symptoms or signs, or for any concerning symptoms.   5. For your for health, you should make healthy food choices and be physically active. Also, you should not smoke or use drugs, and you should not drink alcohol in excess. Please visit [email or web address] for resources and more information.    Chronic Pain    Chronic pain is a complex condition and the Emergency Department is not the ideal place to manage this condition. Prescription painkillers must be written by your primary care provider or a pain management physician. Avoid activities or triggers that exacerbate your pain.    SEEK IMMEDIATE MEDICAL CARE IF YOU HAVE ANY OF THE FOLLOWING SYMPTOMS: severe chest pain, fainting, vomiting blood, dark or bloody stools, or pain different from your chronic pain. 1. You were seen for leg pain. Take motrin or tylenol as needed for pain. Keep legs elevated, wear compression socks   . A copy of any of your resulted labs, imaging, and findings have been provided to you. Make sure to view any test results that may not have yet resulted at the time of your discharge by creating a FollowMyHealth account at: [email or web address] to sign up for FollowMyHealth.   2. Continue to take your home medications as prescribed.   3. Please follow up with your primary care physician. You may call our referrals coordinator at [phone] Monday to Friday 11am-7pm for assistance with making an appointment. Or you can call [phone] to make an appointment.  4. Return to the emergency department for new, persistent, or worsening symptoms or signs, or for any concerning symptoms.   5. For your for health, you should make healthy food choices and be physically active. Also, you should not smoke or use drugs, and you should not drink alcohol in excess. Please visit [facility]/[email or web address] for resources and more information.    Chronic Pain    Chronic pain is a complex condition and the Emergency Department is not the ideal place to manage this condition. Prescription painkillers must be written by your primary care provider or a pain management physician. Avoid activities or triggers that exacerbate your pain.    SEEK IMMEDIATE MEDICAL CARE IF YOU HAVE ANY OF THE FOLLOWING SYMPTOMS: severe chest pain, fainting, vomiting blood, dark or bloody stools, or pain different from your chronic pain. 1. You were seen for leg pain. Take motrin or tylenol as needed for pain. Keep legs elevated, wear compression socks   . A copy of any of your resulted labs, imaging, and findings have been provided to you. Make sure to view any test results that may not have yet resulted at the time of your discharge by creating a FollowMyHealth account at: [email or web address] to sign up for FollowMyHealth.   2. Continue to take your home medications as prescribed.   3. Please follow up with your primary care physician. You may call our referrals coordinator at [phone] Monday to Friday 11am-7pm for assistance with making an appointment. Or you can call [phone] to make an appointment.  4. Return to the emergency department for new, persistent, or worsening symptoms or signs, or for any concerning symptoms.   5. For your for health, you should make healthy food choices and be physically active. Also, you should not smoke or use drugs, and you should not drink alcohol in excess. Please visit [email or web address] for resources and more information.    Chronic Pain    Chronic pain is a complex condition and the Emergency Department is not the ideal place to manage this condition. Prescription painkillers must be written by your primary care provider or a pain management physician. Avoid activities or triggers that exacerbate your pain.    SEEK IMMEDIATE MEDICAL CARE IF YOU HAVE ANY OF THE FOLLOWING SYMPTOMS: severe chest pain, fainting, vomiting blood, dark or bloody stools, or pain different from your chronic pain.

## 2023-12-25 DIAGNOSIS — M79.661 PAIN IN RIGHT LOWER LEG: ICD-10-CM

## 2023-12-25 DIAGNOSIS — M79.89 OTHER SPECIFIED SOFT TISSUE DISORDERS: ICD-10-CM

## 2023-12-25 DIAGNOSIS — Z88.2 ALLERGY STATUS TO SULFONAMIDES: ICD-10-CM

## 2023-12-25 DIAGNOSIS — M79.662 PAIN IN LEFT LOWER LEG: ICD-10-CM

## 2023-12-25 DIAGNOSIS — Z88.1 ALLERGY STATUS TO OTHER ANTIBIOTIC AGENTS STATUS: ICD-10-CM

## 2023-12-25 DIAGNOSIS — E03.9 HYPOTHYROIDISM, UNSPECIFIED: ICD-10-CM

## 2023-12-25 DIAGNOSIS — I48.92 UNSPECIFIED ATRIAL FLUTTER: ICD-10-CM

## 2024-01-19 ENCOUNTER — EMERGENCY (EMERGENCY)
Facility: HOSPITAL | Age: 59
LOS: 1 days | Discharge: HOME | End: 2024-01-19
Admitting: STUDENT IN AN ORGANIZED HEALTH CARE EDUCATION/TRAINING PROGRAM
Payer: MEDICAID

## 2024-01-19 VITALS
RESPIRATION RATE: 18 BRPM | DIASTOLIC BLOOD PRESSURE: 81 MMHG | HEART RATE: 84 BPM | HEIGHT: 68 IN | SYSTOLIC BLOOD PRESSURE: 128 MMHG | TEMPERATURE: 98 F | WEIGHT: 173.94 LBS | OXYGEN SATURATION: 97 %

## 2024-01-19 VITALS
HEART RATE: 80 BPM | DIASTOLIC BLOOD PRESSURE: 84 MMHG | TEMPERATURE: 99 F | RESPIRATION RATE: 17 BRPM | SYSTOLIC BLOOD PRESSURE: 126 MMHG | OXYGEN SATURATION: 98 %

## 2024-01-19 DIAGNOSIS — S22.39XA FRACTURE OF ONE RIB, UNSPECIFIED SIDE, INITIAL ENCOUNTER FOR CLOSED FRACTURE: Chronic | ICD-10-CM

## 2024-01-19 DIAGNOSIS — Z87.09 PERSONAL HISTORY OF OTHER DISEASES OF THE RESPIRATORY SYSTEM: Chronic | ICD-10-CM

## 2024-01-19 DIAGNOSIS — Z88.0 ALLERGY STATUS TO PENICILLIN: ICD-10-CM

## 2024-01-19 DIAGNOSIS — R05.1 ACUTE COUGH: ICD-10-CM

## 2024-01-19 DIAGNOSIS — Z88.1 ALLERGY STATUS TO OTHER ANTIBIOTIC AGENTS STATUS: ICD-10-CM

## 2024-01-19 DIAGNOSIS — R09.81 NASAL CONGESTION: ICD-10-CM

## 2024-01-19 DIAGNOSIS — T38.1X6A UNDERDOSING OF THYROID HORMONES AND SUBSTITUTES, INITIAL ENCOUNTER: ICD-10-CM

## 2024-01-19 DIAGNOSIS — E03.9 HYPOTHYROIDISM, UNSPECIFIED: ICD-10-CM

## 2024-01-19 DIAGNOSIS — Z76.0 ENCOUNTER FOR ISSUE OF REPEAT PRESCRIPTION: ICD-10-CM

## 2024-01-19 DIAGNOSIS — Z91.148 PATIENT'S OTHER NONCOMPLIANCE WITH MEDICATION REGIMEN FOR OTHER REASON: ICD-10-CM

## 2024-01-19 PROCEDURE — 99282 EMERGENCY DEPT VISIT SF MDM: CPT

## 2024-01-19 PROCEDURE — 99283 EMERGENCY DEPT VISIT LOW MDM: CPT

## 2024-01-19 RX ORDER — LEVOTHYROXINE SODIUM 125 MCG
1 TABLET ORAL
Qty: 30 | Refills: 0
Start: 2024-01-19 | End: 2024-02-17

## 2024-01-19 RX ORDER — ASCORBIC ACID 60 MG
1 TABLET,CHEWABLE ORAL
Qty: 30 | Refills: 0
Start: 2024-01-19 | End: 2024-02-17

## 2024-01-19 NOTE — ED PROVIDER NOTE - OBJECTIVE STATEMENT
59 y/o m hx hypothyroidism with many ED visits for rx refills presents stating he ran out of levothyroxine today and needs a prescription.  Pt stating he had colitis last month at another hospital, had been taking vitamin C which he feels helped and wants a prescription for that.  Pt stating he also has a cough for the past 2 days with nasal congestion.  Pt denies fever, chills, CP, SOB, all other ROS negative.

## 2024-01-19 NOTE — ED ADULT NURSE NOTE - NSFALLUNIVINTERV_ED_ALL_ED
Bed/Stretcher in lowest position, wheels locked, appropriate side rails in place/Call bell, personal items and telephone in reach/Instruct patient to call for assistance before getting out of bed/chair/stretcher/Non-slip footwear applied when patient is off stretcher/[place] to call system/Physically safe environment - no spills, clutter or unnecessary equipment/Purposeful proactive rounding/Room/bathroom lighting operational, light cord in reach

## 2024-01-19 NOTE — ED ADULT NURSE NOTE - OBJECTIVE STATEMENT
Pt arrived aox3 c/o cough and requesting levothyroxine and vitamin C refill. Pt denies chest pain, sob, h/a, blurred vision, dizziness, n/v/d.

## 2024-01-19 NOTE — ED PROVIDER NOTE - CLINICAL SUMMARY MEDICAL DECISION MAKING FREE TEXT BOX
59 y/o m hx hypothyroidism presents requesting rx refill of levothyroxine that he ran out of today, also asking for rx for vitamin C.  Pt also with cough and nasal congestion for the past 2 days, is afebrile in ED, likely viral etiology of sx, clear lungs.  Will rx pt's meds and can continue supportive care for URI sx.

## 2024-01-19 NOTE — ED PROVIDER NOTE - PATIENT PORTAL LINK FT
You can access the FollowMyHealth Patient Portal offered by [facility] by registering at the following website: [email or web address]. By joining [employer]’s FollowMyHealth portal, you will also be able to view your health information using other applications (apps) compatible with our system.

## 2024-01-30 ENCOUNTER — APPOINTMENT (OUTPATIENT)
Dept: PHYSICAL MEDICINE AND REHAB | Facility: CLINIC | Age: 59
End: 2024-01-30

## 2024-02-05 ENCOUNTER — EMERGENCY (EMERGENCY)
Facility: HOSPITAL | Age: 59
LOS: 1 days | Discharge: HOME | End: 2024-02-05
Admitting: STUDENT IN AN ORGANIZED HEALTH CARE EDUCATION/TRAINING PROGRAM
Payer: MEDICAID

## 2024-02-05 VITALS
TEMPERATURE: 98 F | SYSTOLIC BLOOD PRESSURE: 140 MMHG | HEIGHT: 68 IN | RESPIRATION RATE: 18 BRPM | OXYGEN SATURATION: 99 % | DIASTOLIC BLOOD PRESSURE: 80 MMHG | HEART RATE: 85 BPM

## 2024-02-05 DIAGNOSIS — S22.39XA FRACTURE OF ONE RIB, UNSPECIFIED SIDE, INITIAL ENCOUNTER FOR CLOSED FRACTURE: Chronic | ICD-10-CM

## 2024-02-05 DIAGNOSIS — E03.9 HYPOTHYROIDISM, UNSPECIFIED: ICD-10-CM

## 2024-02-05 DIAGNOSIS — M79.672 PAIN IN LEFT FOOT: ICD-10-CM

## 2024-02-05 DIAGNOSIS — Z88.0 ALLERGY STATUS TO PENICILLIN: ICD-10-CM

## 2024-02-05 DIAGNOSIS — G89.11 ACUTE PAIN DUE TO TRAUMA: ICD-10-CM

## 2024-02-05 DIAGNOSIS — Z87.09 PERSONAL HISTORY OF OTHER DISEASES OF THE RESPIRATORY SYSTEM: Chronic | ICD-10-CM

## 2024-02-05 DIAGNOSIS — Z88.1 ALLERGY STATUS TO OTHER ANTIBIOTIC AGENTS STATUS: ICD-10-CM

## 2024-02-05 PROCEDURE — 99283 EMERGENCY DEPT VISIT LOW MDM: CPT

## 2024-02-05 PROCEDURE — 99284 EMERGENCY DEPT VISIT MOD MDM: CPT

## 2024-02-05 RX ORDER — METHOCARBAMOL 500 MG/1
500 TABLET, FILM COATED ORAL ONCE
Refills: 0 | Status: COMPLETED | OUTPATIENT
Start: 2024-02-05 | End: 2024-02-05

## 2024-02-05 RX ORDER — IBUPROFEN 200 MG
600 TABLET ORAL ONCE
Refills: 0 | Status: COMPLETED | OUTPATIENT
Start: 2024-02-05 | End: 2024-02-05

## 2024-02-05 RX ORDER — IBUPROFEN 200 MG
1 TABLET ORAL
Qty: 28 | Refills: 0
Start: 2024-02-05 | End: 2024-02-18

## 2024-02-05 RX ADMIN — METHOCARBAMOL 500 MILLIGRAM(S): 500 TABLET, FILM COATED ORAL at 22:37

## 2024-02-05 RX ADMIN — Medication 600 MILLIGRAM(S): at 22:37
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
## 2024-02-05 NOTE — ED PROVIDER NOTE - PHYSICAL EXAMINATION
LLE : foot - skin intact, no swelling/ecchymosis/erythema, no tenderness over medial / lateral malleolus. no tenderness to tarsals, phalanges, calcaneous, proximal fibula, knee. FROM toes/ankle/knee without difficulty or pain, dorsiflexion and plantar flexion normal, neg britt test, sensation intact throughout, pulses 2+, cap refill <2sec     Constitutional : Well appearing, non-toxic, no acute distress. awake, alert, oriented to person, place, time/situation.  Head : head normocephalic, atraumatic  EENMT : eyes clear bilaterally, PERRL, EOMI. airway patent. moist mucous membranes. neck supple.  Cardiac : Extremities warm and well perfused. 2+ radial and DP pulses. cap refill <2 seconds. no LE edema.  Resp : Respirations even and unlabored.   MSK :  range of motion is not limited, no muscle or joint tenderness  Back : No evidence of trauma. No spinal or CVA tenderness.  Neuro : Alert and oriented, CNII-XII grossly intact, no focal deficits, no motor or sensory deficits.  Skin : Skin normal color for race, warm, dry and intact. No evidence of rash.  Psych : Alert and oriented to person, place, time/situation. normal mood and affect. no apparent risk to self or others.
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
## 2024-02-05 NOTE — ED PROVIDER NOTE - NSFOLLOWUPINSTRUCTIONS_ED_ALL_ED_FT
Rest the leg, avoid heavy lifting, strenuous activity.   Ice the area, 20 minutes 4 times a day   Use ace wrap / compression to help with swelling and pain.  Elevate the leg to decrease swelling and promote healing.   Take over the counter medications (tylneol / motirn) for pain. See below for dosing.     Follow up with Orthopedics within 1-2 weeks for further / continued evaluation. See office information listed here.     Return to the Emergency Department if you have any worsening or new symptoms such as inability to walk, numbness/tingling/paresthesias, severe swelling/redness, pain that cannot be controlled with over the counter medication, any chest pain or shortness of breath, or any other serious concerns.

## 2024-02-05 NOTE — ED ADULT NURSE NOTE - TEMPLATE
Pt brought in by wife for seizures at home. Patient has brain cancer and is suppose to go to epilepsy center next Tuesday. Patient had 5 auras yesterday and 2 today. Was given valium at home by wife.   
Orthopedic

## 2024-02-05 NOTE — ED PROVIDER NOTE - CARE PROVIDER_API CALL
Tru Muñiz  Orthopaedic Surgery  [address]  [place] [address]  Phone: [phone]  Fax: [phone]  Follow Up Time:

## 2024-02-05 NOTE — ED PROVIDER NOTE - PATIENT PORTAL LINK FT
You can access the FollowMyHealth Patient Portal offered by [facility] by registering at the following website: [email or web address]. By joining [employer]’s FollowMyHealth portal, you will also be able to view your health information using other applications (apps) compatible with our system.

## 2024-02-05 NOTE — ED ADULT NURSE NOTE - SUICIDE SCREENING QUESTION 3
CLINICAL NUTRITION SERVICES - BRIEF NOTE   (See RD note on 11/14 for full assessment)     Reason for RD note:   Noted pt will require return to using Creon and sodium bicarb for about 1 weeks after discharge until her supply of relisorb will be delivered to her at home.     New Findings/Chart Review:  Nutrition support:   TF currently off for ERCP but Vital 1.5 was running @ goal of 45 ml/hr x 24 hr.  Relizorb was started yesterday--1 cartridge q 12 hr for TF rate > 20 ml/hr.     Oral Intake:   Pt was advanced to regular diet with only 1 meal documented to  expected oral intake.  Then NPO for ERCP (in progress now.).     Interventions:  With diet resume, will need oral Creon (as she was doing at home).  Will order Creon 24 --2-3 capsules with meals and 1 with snacks/supplements.     For discharge orders will need Creon 24 and sodium bicarb reordered until Relistor is delivered.      If pt continues on full volume of Vital 1.5 @ home would recommend the following dosing plan:    Sodium Bicarb tablet (325 mg), 1 tablet q 4 hrs via Jtube. Administration Instructions: Crush 1 tablet and mix into 15 ml of warm water and use this solution to mix with Creon pancreatic enzymes. DO NOT administer directly into Jtube (to be mixed into TF formula with Creon enzyme - see Creon enzyme order)   B) Creon 24, 2-3 capsules q 4 hrs via Jtube. Administration Instructions:   --If TF rate is running @ 35-65 ml/hr, give to 2 capsules q 4 hrs while TF running.    - If TF rate is running @ 75-90ml/hr, give 3 capsules q 4 hr while TF running.      Future/Additional Recommendations:  Will follow up with pt tomorrow to discuss anticipated tolerance to oral intake (if known so soon after ERCP) and her desired TF run time if tolerated.       Nutrition will continue to follow per protocol.    Sabrina Winston RD, LD   7B (M-F) Pager: [phone]  RD Weekend Pager: [phone]     No

## 2024-02-15 ENCOUNTER — EMERGENCY (EMERGENCY)
Facility: HOSPITAL | Age: 59
LOS: 0 days | Discharge: HOME | End: 2024-02-15
Attending: STUDENT IN AN ORGANIZED HEALTH CARE EDUCATION/TRAINING PROGRAM
Payer: MEDICAID

## 2024-02-15 VITALS
OXYGEN SATURATION: 96 % | HEART RATE: 87 BPM | HEIGHT: 68 IN | SYSTOLIC BLOOD PRESSURE: 174 MMHG | WEIGHT: 181 LBS | RESPIRATION RATE: 18 BRPM | DIASTOLIC BLOOD PRESSURE: 110 MMHG | TEMPERATURE: 98 F

## 2024-02-15 VITALS
OXYGEN SATURATION: 97 % | HEART RATE: 84 BPM | DIASTOLIC BLOOD PRESSURE: 83 MMHG | SYSTOLIC BLOOD PRESSURE: 128 MMHG | RESPIRATION RATE: 18 BRPM | TEMPERATURE: 98 F
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
## 2024-02-15 DIAGNOSIS — S22.39XA FRACTURE OF ONE RIB, UNSPECIFIED SIDE, INITIAL ENCOUNTER FOR CLOSED FRACTURE: Chronic | ICD-10-CM

## 2024-02-15 DIAGNOSIS — E03.9 HYPOTHYROIDISM, UNSPECIFIED: ICD-10-CM

## 2024-02-15 DIAGNOSIS — Z88.0 ALLERGY STATUS TO PENICILLIN: ICD-10-CM
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
## 2024-02-15 DIAGNOSIS — Z59.01 SHELTERED HOMELESSNESS: ICD-10-CM

## 2024-02-15 DIAGNOSIS — S09.90XA UNSPECIFIED INJURY OF HEAD, INITIAL ENCOUNTER: ICD-10-CM

## 2024-02-15 DIAGNOSIS — Z88.1 ALLERGY STATUS TO OTHER ANTIBIOTIC AGENTS STATUS: ICD-10-CM

## 2024-02-15 DIAGNOSIS — Z87.09 PERSONAL HISTORY OF OTHER DISEASES OF THE RESPIRATORY SYSTEM: Chronic | ICD-10-CM

## 2024-02-15 LAB
ANION GAP SERPL CALC-SCNC: 2 MMOL/L — LOW (ref 5–17)
BASOPHILS # BLD AUTO: 0.04 K/UL — SIGNIFICANT CHANGE UP (ref 0–0.2)
BASOPHILS NFR BLD AUTO: 0.4 % — SIGNIFICANT CHANGE UP (ref 0–2)
BUN SERPL-MCNC: 17 MG/DL — SIGNIFICANT CHANGE UP (ref 7–23)
CALCIUM SERPL-MCNC: 9 MG/DL — SIGNIFICANT CHANGE UP (ref 8.5–10.1)
CHLORIDE SERPL-SCNC: 110 MMOL/L — HIGH (ref 96–108)
CO2 SERPL-SCNC: 30 MMOL/L — SIGNIFICANT CHANGE UP (ref 22–31)
CREAT SERPL-MCNC: 0.86 MG/DL — SIGNIFICANT CHANGE UP (ref 0.5–1.3)
EGFR: 100 ML/MIN/1.73M2 — SIGNIFICANT CHANGE UP
EOSINOPHIL # BLD AUTO: 0.26 K/UL — SIGNIFICANT CHANGE UP (ref 0–0.5)
EOSINOPHIL NFR BLD AUTO: 2.6 % — SIGNIFICANT CHANGE UP (ref 0–6)
GLUCOSE SERPL-MCNC: 101 MG/DL — HIGH (ref 70–99)
HCT VFR BLD CALC: 33.5 % — LOW (ref 39–50)
HGB BLD-MCNC: 9.9 G/DL — LOW (ref 13–17)
IMM GRANULOCYTES NFR BLD AUTO: 0.7 % — SIGNIFICANT CHANGE UP (ref 0–0.9)
LYMPHOCYTES # BLD AUTO: 1.7 K/UL — SIGNIFICANT CHANGE UP (ref 1–3.3)
LYMPHOCYTES # BLD AUTO: 16.9 % — SIGNIFICANT CHANGE UP (ref 13–44)
MCHC RBC-ENTMCNC: 26.5 PG — LOW (ref 27–34)
MCHC RBC-ENTMCNC: 29.6 G/DL — LOW (ref 32–36)
MCV RBC AUTO: 89.6 FL — SIGNIFICANT CHANGE UP (ref 80–100)
MONOCYTES # BLD AUTO: 1.1 K/UL — HIGH (ref 0–0.9)
MONOCYTES NFR BLD AUTO: 10.9 % — SIGNIFICANT CHANGE UP (ref 2–14)
NEUTROPHILS # BLD AUTO: 6.91 K/UL — SIGNIFICANT CHANGE UP (ref 1.8–7.4)
NEUTROPHILS NFR BLD AUTO: 68.5 % — SIGNIFICANT CHANGE UP (ref 43–77)
NRBC # BLD: 0 /100 WBCS — SIGNIFICANT CHANGE UP (ref 0–0)
PLATELET # BLD AUTO: 585 K/UL — HIGH (ref 150–400)
POTASSIUM SERPL-MCNC: 4 MMOL/L — SIGNIFICANT CHANGE UP (ref 3.5–5.3)
POTASSIUM SERPL-SCNC: 4 MMOL/L — SIGNIFICANT CHANGE UP (ref 3.5–5.3)
RBC # BLD: 3.74 M/UL — LOW (ref 4.2–5.8)
RBC # FLD: 16.8 % — HIGH (ref 10.3–14.5)
SODIUM SERPL-SCNC: 142 MMOL/L — SIGNIFICANT CHANGE UP (ref 135–145)
TROPONIN I, HIGH SENSITIVITY RESULT: 17.5 NG/L — SIGNIFICANT CHANGE UP
WBC # BLD: 10.08 K/UL — SIGNIFICANT CHANGE UP (ref 3.8–10.5)
WBC # FLD AUTO: 10.08 K/UL — SIGNIFICANT CHANGE UP (ref 3.8–10.5)

## 2024-02-15 PROCEDURE — 70450 CT HEAD/BRAIN W/O DYE: CPT | Mod: 26,MG

## 2024-02-15 PROCEDURE — 99285 EMERGENCY DEPT VISIT HI MDM: CPT | Mod: 25

## 2024-02-15 PROCEDURE — 93010 ELECTROCARDIOGRAM REPORT: CPT

## 2024-02-15 PROCEDURE — G1004: CPT

## 2024-02-15 RX ORDER — ACETAMINOPHEN 500 MG
650 TABLET ORAL ONCE
Refills: 0 | Status: COMPLETED | OUTPATIENT
Start: 2024-02-15 | End: 2024-02-15

## 2024-02-15 RX ADMIN — Medication 650 MILLIGRAM(S): at 04:47

## 2024-02-15 SDOH — ECONOMIC STABILITY - HOUSING INSECURITY: SHELTERED HOMELESSNESS: Z59.01

## 2024-02-15 NOTE — ED PROVIDER NOTE - PATIENT PORTAL LINK FT
You can access the FollowMyHealth Patient Portal offered by [facility] by registering at the following website: [email or web address]. By joining [employer]’s FollowMyHealth portal, you will also be able to view your health information using other applications (apps) compatible with our system.
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
## 2024-02-15 NOTE — ED ADULT NURSE NOTE - NSFALLUNIVINTERV_ED_ALL_ED
Bed/Stretcher in lowest position, wheels locked, appropriate side rails in place/Call bell, personal items and telephone in reach/Instruct patient to call for assistance before getting out of bed/chair/stretcher/Non-slip footwear applied when patient is off stretcher/[place] to call system/Physically safe environment - no spills, clutter or unnecessary equipment/Purposeful proactive rounding/Room/bathroom lighting operational, light cord in reach

## 2024-02-15 NOTE — ED PROVIDER NOTE - PHYSICAL EXAMINATION
General: Well appearing male in no acute distress  HEENT: Normocephalic, atraumatic. Moist mucous membranes. Oropharynx clear. No lymphadenopathy.  Eyes: No scleral icterus. EOMI. MALATHI.  Neck:. Soft and supple. Full ROM without pain. No midline tenderness  Cardiac: Regular rate and regular rhythm. No murmurs, rubs, gallops. Peripheral pulses 2+ and symmetric. No LE edema.  Resp: Lungs CTAB. Speaking in full sentences. No wheezes, rales or rhonchi.  Abd: Soft, non-tender, non-distended. No guarding or rebound. No scars, masses, or lesions.  Back: Spine midline and non-tender. No CVA tenderness.    Skin: No rashes, abrasions, or lacerations.  Neuro: AO x 3. Moves all extremities symmetrically. Motor strength and sensation grossly intact. steady gait.
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
## 2024-02-15 NOTE — ED PROVIDER NOTE - NSFOLLOWUPINSTRUCTIONS_ED_ALL_ED_FT
followup with primary care doctor in next 7 days.     Closed Head Injury    A closed head injury is an injury to your head that may or may not involve a traumatic brain injury (TBI). Symptoms of TBI can be short or long lasting and include headache, dizziness, interference with memory or speech, fatigue, confusion, changes in sleep, mood changes, nausea, depression/anxiety, and dulling of senses. Make sure to obtain proper rest which includes getting plenty of sleep, avoiding excessive visual stimulation, and avoiding activities that may cause physical or mental stress. Avoid any situation where there is potential for another head injury, including sports.    SEEK IMMEDIATE MEDICAL CARE IF YOU HAVE ANY OF THE FOLLOWING SYMPTOMS: unusual drowsiness, vomiting, severe dizziness, seizures, lightheadedness, muscular weakness, different pupil sizes, visual changes, or clear or bloody discharge from your ears or nose.
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
## 2024-02-15 NOTE — ED ADULT NURSE NOTE - CAS EDN DISCHARGE ASSESSMENT
GCS 15.  NAD.  Patient independently ambulatory with a steady gait./Alert and oriented to person, place and time

## 2024-02-22 ENCOUNTER — APPOINTMENT (OUTPATIENT)
Dept: ORTHOPEDIC SURGERY | Facility: CLINIC | Age: 59
End: 2024-02-22

## 2024-02-28 ENCOUNTER — EMERGENCY (EMERGENCY)
Facility: HOSPITAL | Age: 59
LOS: 1 days | Discharge: HOME | End: 2024-02-28
Admitting: EMERGENCY MEDICINE
Payer: MEDICAID

## 2024-02-28 VITALS
TEMPERATURE: 98 F | WEIGHT: 171.96 LBS | HEIGHT: 68 IN | SYSTOLIC BLOOD PRESSURE: 110 MMHG | OXYGEN SATURATION: 98 % | HEART RATE: 71 BPM | DIASTOLIC BLOOD PRESSURE: 73 MMHG | RESPIRATION RATE: 16 BRPM

## 2024-02-28 DIAGNOSIS — S22.39XA FRACTURE OF ONE RIB, UNSPECIFIED SIDE, INITIAL ENCOUNTER FOR CLOSED FRACTURE: Chronic | ICD-10-CM

## 2024-02-28 DIAGNOSIS — Z87.09 PERSONAL HISTORY OF OTHER DISEASES OF THE RESPIRATORY SYSTEM: Chronic | ICD-10-CM

## 2024-02-28 PROCEDURE — 99283 EMERGENCY DEPT VISIT LOW MDM: CPT

## 2024-02-28 PROCEDURE — 99284 EMERGENCY DEPT VISIT MOD MDM: CPT | Mod: 25

## 2024-02-28 RX ORDER — METHOCARBAMOL 500 MG/1
500 TABLET, FILM COATED ORAL ONCE
Refills: 0 | Status: COMPLETED | OUTPATIENT
Start: 2024-02-28 | End: 2024-02-28

## 2024-02-28 RX ORDER — CYCLOBENZAPRINE HYDROCHLORIDE 10 MG/1
1 TABLET, FILM COATED ORAL
Qty: 10 | Refills: 0
Start: 2024-02-28 | End: 2024-03-03

## 2024-02-28 RX ADMIN — METHOCARBAMOL 500 MILLIGRAM(S): 500 TABLET, FILM COATED ORAL at 05:35

## 2024-02-28 RX ADMIN — Medication 500 MILLIGRAM(S): at 05:36
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
## 2024-02-28 NOTE — ED ADULT NURSE NOTE - NSFALLUNIVINTERV_ED_ALL_ED
Bed/Stretcher in lowest position, wheels locked, appropriate side rails in place/Call bell, personal items and telephone in reach/Instruct patient to call for assistance before getting out of bed/chair/stretcher/Non-slip footwear applied when patient is off stretcher/[place] to call system/Physically safe environment - no spills, clutter or unnecessary equipment/Purposeful proactive rounding/Room/bathroom lighting operational, light cord in reach

## 2024-02-28 NOTE — ED PROVIDER NOTE - NSFOLLOWUPCLINICS_GEN_ALL_ED_FT
[facility] Podiatry [facility]  Podiatry  [address] [place] [address]  Phone: [phone]  Fax:

## 2024-02-28 NOTE — ED PROVIDER NOTE - PATIENT PORTAL LINK FT
You can access the FollowMyHealth Patient Portal offered by [facility] by registering at the following website: [email or web address]. By joining [employer]’s FollowMyHealth portal, you will also be able to view your health information using other applications (apps) compatible with our system.
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
## 2024-03-01 DIAGNOSIS — Z88.2 ALLERGY STATUS TO SULFONAMIDES: ICD-10-CM

## 2024-03-01 DIAGNOSIS — Z88.0 ALLERGY STATUS TO PENICILLIN: ICD-10-CM

## 2024-03-01 DIAGNOSIS — M25.571 PAIN IN RIGHT ANKLE AND JOINTS OF RIGHT FOOT: ICD-10-CM

## 2024-03-01 DIAGNOSIS — Z88.1 ALLERGY STATUS TO OTHER ANTIBIOTIC AGENTS STATUS: ICD-10-CM

## 2024-03-01 DIAGNOSIS — E03.9 HYPOTHYROIDISM, UNSPECIFIED: ICD-10-CM

## 2024-03-01 DIAGNOSIS — M79.671 PAIN IN RIGHT FOOT: ICD-10-CM

## 2024-03-15 NOTE — ED ADULT NURSE NOTE - ALCOHOL PRE SCREEN (AUDIT - C)
Problem: Respiratory Impairment - Respiratory Therapy 253  Intervention: Airway clearance techniques  Note: Intervention Status  Done  Intervention: Inhaled medication delivery  Note: Intervention Status  Done  Intervention: Respiratory support devices  Note: Intervention Status  Done      Statement Selected

## 2024-03-26 NOTE — ED ADULT TRIAGE NOTE - TEMPERATURE IN CELSIUS (DEGREES C)
36.7 Objective  – Vital Signs  Vital Signs Last 24 Hrs  T(C): --  T(F): --  HR: --  BP: --  BP(mean): --  RR: --  SpO2: --    – PE:   CV: RRR  Pulm: breathing comfortably on RA  Abd: gravid, nontender  Extr: moving all extremities with ease  – FHT: baseline 1, mod variability, +accels, -decels  – [place]:   – EFW: 2550g

## 2024-04-02 NOTE — ED ADULT NURSE NOTE - NS PRO AD NO ADVANCE DIRECTIVE
In epigastrium   Unable to tolerate po well  Suspicion for obstruction is low at this point  Barium esophagram w/o obstruction   GI consult appreciated    Pt underwent EGD - malignant appearing ulcer lesser curvature fundus, hiatal hernia, antral erosion. Biopsy obtained.   Plan is to monitor overnight for risk of bleeding, PPI and advance diet slowly.   -Supportive care   No

## 2024-04-11 NOTE — ED ADULT NURSE NOTE - IN THE PAST 12 MONTHS HAVE YOU USED DRUGS OTHER THAN THOSE REQUIRED FOR MEDICAL REASON?
PCP: DALE Harris MD    Last appt: 3/18/2024    Future Appointments   Date Time Provider Department Center   9/30/2024  8:00 AM DALE Harris MD [facility]       Requested Prescriptions     Pending Prescriptions Disp Refills    allopurinol (ZYLOPRIM) 300 MG tablet [Pharmacy Med Name: ALLOPURINOL 300MG TABLETS] 90 tablet 1     Sig: TAKE 1 TABLET BY MOUTH DAILY       Yes

## 2024-05-07 NOTE — ED ADULT TRIAGE NOTE - BMI (KG/M2)
Physical Activity: Not on file   Stress: Not on file   Social Connections: Not on file   Intimate Partner Violence: Not on file   Housing Stability: High Risk (5/7/2024)    Housing Stability Vital Sign     Unable to Pay for Housing in the Last Year: Not on file     Number of Places Lived in the Last Year: Not on file     Unstable Housing in the Last Year: Yes       Review of Systems:  A comprehensive review of systems was negative except for what was noted in the HPI.     Physical Exam:   Vitals:    05/07/24 1312   BP: 112/64   Site: Right Upper Arm   Position: Sitting   Cuff Size: Large Adult   Pulse: 90   Resp: 16   SpO2: 96%   Weight: 80.7 kg (178 lb)   Height: 1.6 m (5' 3\")     Body mass index is 31.53 kg/m².   Constitutional: She is oriented to person, place, and time. She appears well-developed and well-nourished. No distress.   HEENT:   Head: Normocephalic and atraumatic.   Right Ear: Tympanic membrane, external ear and ear canal normal.   Left Ear: Tympanic membrane, external ear and ear canal normal.   Nose: Nose normal.   Mouth/Throat: Oropharynx is clear and moist, and mucous membranes are normal.  There is no cervical adenopathy.  Eyes: Conjunctivae and extraocular motions are normal. Pupils are equal, round, and reactive to light.   Neck: Neck supple. No JVD present. Carotid bruit is not present. No mass and no thyromegaly present.   Cardiovascular: Normal rate, regular rhythm, normal heart sounds and intact distal pulses.  Exam reveals no gallop and no friction rub.  No murmur heard.  Pulmonary/Chest: Effort normal and breath sounds normal. No respiratory distress. She has no wheezes, rhonchi or rales.   Abdominal: Soft, non-tender. Bowel sounds and aorta are normal. She exhibits no organomegaly, mass or bruit.     Musculoskeletal: Normal range of motion, no synovitis. She exhibits no edema.   Neurological: She is alert and oriented to person, place, and time. She has normal reflexes. No cranial 
33.5

## 2024-05-12 ENCOUNTER — EMERGENCY (EMERGENCY)
Facility: HOSPITAL | Age: 59
LOS: 0 days | Discharge: HOME | End: 2024-05-13
Attending: STUDENT IN AN ORGANIZED HEALTH CARE EDUCATION/TRAINING PROGRAM
Payer: MEDICAID

## 2024-05-12 VITALS
RESPIRATION RATE: 16 BRPM | SYSTOLIC BLOOD PRESSURE: 142 MMHG | OXYGEN SATURATION: 96 % | DIASTOLIC BLOOD PRESSURE: 100 MMHG | HEART RATE: 75 BPM | WEIGHT: 175.05 LBS | HEIGHT: 68 IN | TEMPERATURE: 98 F

## 2024-05-12 DIAGNOSIS — M79.672 PAIN IN LEFT FOOT: ICD-10-CM

## 2024-05-12 DIAGNOSIS — Z87.09 PERSONAL HISTORY OF OTHER DISEASES OF THE RESPIRATORY SYSTEM: Chronic | ICD-10-CM

## 2024-05-12 DIAGNOSIS — S22.39XA FRACTURE OF ONE RIB, UNSPECIFIED SIDE, INITIAL ENCOUNTER FOR CLOSED FRACTURE: Chronic | ICD-10-CM

## 2024-05-12 DIAGNOSIS — M54.2 CERVICALGIA: ICD-10-CM

## 2024-05-12 PROCEDURE — 99283 EMERGENCY DEPT VISIT LOW MDM: CPT | Mod: 25

## 2024-05-12 NOTE — ED ADULT TRIAGE NOTE - PATIENT ON (OXYGEN DELIVERY METHOD)
Spoke with spouse Jg room air

## 2024-05-13 RX ORDER — LEVOTHYROXINE SODIUM 125 MCG
175 TABLET ORAL ONCE
Refills: 0 | Status: COMPLETED | OUTPATIENT
Start: 2024-05-13 | End: 2024-05-13

## 2024-05-13 RX ORDER — IBUPROFEN 200 MG
1 TABLET ORAL
Qty: 21 | Refills: 0
Start: 2024-05-13 | End: 2024-05-19

## 2024-05-13 RX ORDER — LEVOTHYROXINE SODIUM 125 MCG
1 TABLET ORAL
Qty: 21 | Refills: 0
Start: 2024-05-13 | End: 2024-06-02

## 2024-05-13 RX ORDER — IBUPROFEN 200 MG
600 TABLET ORAL ONCE
Refills: 0 | Status: COMPLETED | OUTPATIENT
Start: 2024-05-13 | End: 2024-05-13

## 2024-05-13 RX ORDER — METHOCARBAMOL 500 MG/1
1 TABLET, FILM COATED ORAL
Qty: 21 | Refills: 0
Start: 2024-05-13 | End: 2024-05-19

## 2024-05-13 RX ORDER — METHOCARBAMOL 500 MG/1
750 TABLET, FILM COATED ORAL ONCE
Refills: 0 | Status: COMPLETED | OUTPATIENT
Start: 2024-05-13 | End: 2024-05-13

## 2024-05-13 RX ADMIN — Medication 600 MILLIGRAM(S): at 04:29

## 2024-05-13 RX ADMIN — Medication 175 MICROGRAM(S): at 04:29

## 2024-05-13 RX ADMIN — METHOCARBAMOL 750 MILLIGRAM(S): 500 TABLET, FILM COATED ORAL at 04:45

## 2024-05-13 NOTE — ED PROVIDER NOTE - CARE PROVIDERS DIRECT ADDRESSES
,DirectAddress_Unknown,[email or web address]

## 2024-05-13 NOTE — ED PROVIDER NOTE - OBJECTIVE STATEMENT
57 y/o M hx of hypothyroidism presents w/ L foot pain for 3 months. states he needs "therapy" for his chronic pain. also endorsing chronic R shoulder pain. has had xrays prior w/ no acute findings. denies any difficulty ambulating. requesting refill for his synthroid as he ran out. requesting muscle relaxant. denies nausea/vomiting. denies fever/chills. denies numbness/tingling.

## 2024-05-13 NOTE — ED ADULT NURSE NOTE - NSFALLUNIVINTERV_ED_ALL_ED
Bed/Stretcher in lowest position, wheels locked, appropriate side rails in place/Call bell, personal items and telephone in reach/Instruct patient to call for assistance before getting out of bed/chair/stretcher/Non-slip footwear applied when patient is off stretcher/[place] to call system/Physically safe environment - no spills, clutter or unnecessary equipment/Purposeful proactive rounding/Room/bathroom lighting operational, light cord in reach

## 2024-05-13 NOTE — ED PROVIDER NOTE - CLINICAL SUMMARY MEDICAL DECISION MAKING FREE TEXT BOX
59 y/o M hx of hypothyroidism presents w/ L foot pain for 3 months. states he needs "therapy" for his chronic pain. also endorsing chronic R shoulder pain. has had xrays prior w/ no acute findings. denies any difficulty ambulating. requesting refill for his synthroid as he ran out. requesting muscle relaxant. denies nausea/vomiting. denies fever/chills. denies numbness/tingling.   chronic foot pain, refer to ortho / podiatry for evaluation   pain med, muscle relaxant.   refill of synthroid.   patient declined repeat imaging as he has had w/ no acute findings and no new trauma/falls .    does not require admission at this time.

## 2024-05-13 NOTE — ED PROVIDER NOTE - PATIENT PORTAL LINK FT
You can access the FollowMyHealth Patient Portal offered by [facility] by registering at the following website: [email or web address]. By joining [employer]’s FollowMyHealth portal, you will also be able to view your health information using other applications (apps) compatible with our system.

## 2024-05-13 NOTE — ED PROVIDER NOTE - PROVIDER TOKENS
PROVIDER:[TOKEN:[[id number]],FOLLOWUP:[7-10 Days]],PROVIDER:[TOKEN:[[id number]],FOLLOWUP:[4-6 Days]],PROVIDER:[TOKEN:[[id number]],FOLLOWUP:[7-10 Days]]

## 2024-05-13 NOTE — ED PROVIDER NOTE - CARE PROVIDER_API CALL
Waterhouse, Joseph Cameron  Podiatric Medicine and Surgery  [address] [place] [address]  Phone: [phone]  Fax: [phone]  Follow Up Time: 7-10 Days    Cam Bains  Orthopaedic Surgery  [address] [facility], [address] [address]  Phone: [phone]  Fax: [phone]  Follow Up Time: 4-6 Days    Kun Hanna  Internal Medicine  [address] [place] [address]  Phone: [phone]  Fax: [phone]  Follow Up Time: 7-10 Days

## 2024-05-13 NOTE — ED PROVIDER NOTE - NSFOLLOWUPINSTRUCTIONS_ED_ALL_ED_FT
can take ibuprofen as prescribed for pain  can use muscle relaxant for spasms muscle as needed, do not take prior to driving/operating machinery due to potential drowsiness  followup with orthopedic/ podiatry in next 7 days.   followup with primary care doctor in 7 days   return if symptoms worsen, worsening swelling, numbness/tingling.

## 2024-05-13 NOTE — ED PROVIDER NOTE - PHYSICAL EXAMINATION
General: Well appearing female in no acute distress  HEENT: Normocephalic, atraumatic. Moist mucous membranes. Oropharynx clear. No lymphadenopathy.  Eyes: No scleral icterus. EOMI. MALATHI.  Neck:. Soft and supple. Full ROM without pain. No midline tenderness  Cardiac: Regular rate and regular rhythm. No murmurs, rubs, gallops. Peripheral pulses 2+ and symmetric. No LE edema.  Resp: Lungs CTAB. Speaking in full sentences. No wheezes, rales or rhonchi.  Abd: Soft, non-tender, non-distended. No guarding or rebound. No scars, masses, or lesions.  Back: Spine midline and non-tender. No CVA tenderness.    Skin: No rashes, abrasions, or lacerations.  Neuro: AO x 3. Moves all extremities symmetrically. Motor strength and sensation grossly intact.  MSK: no significant tenderness over L foot, no signs of edema/bruising/ecchymosis, steady gait w/ no limp. DP 2+ in LLE.

## 2024-06-28 NOTE — ED ADULT TRIAGE NOTE - PAIN: PRESENCE, MLM
Wt Readings from Last 3 Encounters:   06/27/24 99.4 kg (219 lb 2.2 oz)   06/19/24 99.6 kg (219 lb 9.3 oz)   04/12/24 95 kg (209 lb 7 oz)       Home diuretic: Torsemide 20 Mg MWF and 10 Mg T, Th, Sat  Last echo 8/2023: LVEF 45%.  G1 DD.  Home torsemide held due to hypotension   S/P IVF given for hypotension with worsening resp status - hold further fluids   S/P IV lasix x 2 doses   CXR with pulmonary edema   Updated echo: EF has dropped from 45% to 25%. Mitral regurgitation is now severe.   Cardiology consulted   Continue IV lasix 40 mg prn for volume overload  Patient is now on comfort measures only, no further workup   denies pain/discomfort

## 2024-07-03 NOTE — ED ADULT NURSE NOTE - SUICIDE SCREENING DEPRESSION
Negative

## 2024-07-26 ENCOUNTER — RESULT REVIEW (OUTPATIENT)
Age: 59
End: 2024-07-26

## 2024-07-26 ENCOUNTER — APPOINTMENT (OUTPATIENT)
Dept: PHYSICAL MEDICINE AND REHAB | Facility: CLINIC | Age: 59
End: 2024-07-26
Payer: MEDICAID

## 2024-07-26 VITALS
SYSTOLIC BLOOD PRESSURE: 145 MMHG | OXYGEN SATURATION: 98 % | BODY MASS INDEX: 25.31 KG/M2 | WEIGHT: 167 LBS | HEART RATE: 64 BPM | HEIGHT: 68 IN | TEMPERATURE: 96.7 F | DIASTOLIC BLOOD PRESSURE: 95 MMHG

## 2024-07-26 DIAGNOSIS — S93.492S SPRAIN OF OTHER LIGAMENT OF LEFT ANKLE, SEQUELA: ICD-10-CM

## 2024-07-26 DIAGNOSIS — M25.311 OTHER INSTABILITY, RIGHT SHOULDER: ICD-10-CM

## 2024-07-26 DIAGNOSIS — M25.872 OTHER SPECIFIED JOINT DISORDERS, LEFT ANKLE AND FOOT: ICD-10-CM

## 2024-07-26 DIAGNOSIS — M75.51 BURSITIS OF RIGHT SHOULDER: ICD-10-CM

## 2024-07-26 DIAGNOSIS — R29.898 OTHER SYMPTOMS AND SIGNS INVOLVING THE MUSCULOSKELETAL SYSTEM: ICD-10-CM

## 2024-07-26 DIAGNOSIS — S49.91XS UNSPECIFIED INJURY OF RIGHT SHOULDER AND UPPER ARM, SEQUELA: ICD-10-CM
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
## 2024-07-26 DIAGNOSIS — M19.011 PRIMARY OSTEOARTHRITIS, RIGHT SHOULDER: ICD-10-CM

## 2024-07-26 DIAGNOSIS — M25.472 EFFUSION, LEFT ANKLE: ICD-10-CM

## 2024-07-26 PROCEDURE — 99215 OFFICE O/P EST HI 40 MIN: CPT

## 2024-07-26 PROCEDURE — G2211 COMPLEX E/M VISIT ADD ON: CPT | Mod: NC
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
## 2024-07-26 NOTE — DATA REVIEWED
[FreeTextEntry1] : ACC: [id number]     EXAM:  XR SHOULDER COMP MIN 2V BI PROCEDURE DATE:  12/08/2022 INTERPRETATION:  INDICATION: Bilateral shoulder pain TECHNIQUE: 4 views of each shoulder COMPARISON: None available FINDINGS: There is no fracture or dislocation. There are degenerative changes of the bilateral glenohumeral joints, moderate to severe on the right and moderate in the left.  There is no focal soft tissue abnormality. IMPRESSION: There are degenerative changes of the bilateral glenohumeral joints, moderate to severe on the right and moderate in the left.
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
## 2024-07-26 NOTE — PHYSICAL EXAM
[FreeTextEntry1] : RIGHT SHOULDER:\par  neg effusion\par  neg scars or lacerations or lesions\par  neg increased warmth\par  neg scapular winging\par  neg muscle atrophy\par  \par  Palpation:\par  no TTP over sterna-clavicular joint\par  no TTP over clavicle\par  no TTP over coracoid process\par  no TTP over sternum\par  +TTP over AC joint\par  +TTP over humerus\par  no TTP over the spine of the scapula\par  no TTP over the medial border of the scapula, superior angle, inferior angle, lateral border\par  +TTP over supraspinatus\par  no TTP over infraspinatusrc\par  no TTP over upper trapezius\par  +TTP over deltoid muscle\par  no TTP in the axilla\par  neg crepitus with PROM shoulder\par  \par  AROM:\par  active range of motion limited with pain at terminal ROM\par  \par  Abduction 160/170-180\par  Forward Flexion 160/160-180\par  Elevation through the plane of the scapula 160/170-180\par  External Rotation 60/80-90\par  Internal Rotation 50/\par  Extension 10/50-60\par  Adduction 30/50-75\par  \par  PROM consistent with above, painless\par  \par  Manual Muscle Testing\par  4/5 to resisted isometric testing in all planes\par  \par  neg sulcus sign\par  +Neer test\par  +Morel test\par  +Hoa's test\par  + Speed's test\par  neg Yergason's test\par  neg drop arm test\par  +empty can test\par  neg external rotation lag sign\par  neg lift off sign\par  neg hornblower's sign\par  +Horizontal adduction test\par  \par  Sensation to light touch intact over all dermatomes about the shoulder\par  Distal pulses present\par  \par  LEFT SHOULDER:\par  neg effusion\par  neg scars or lacerations or lesions\par  neg increased warmth\par  neg scapular winging\par  neg muscle atrophy\par  \par  Palpation:\par  no TTP over sterna-clavicular joint\par  no TTP over clavicle\par  no TTP over coracoid process\par  no TTP over sternum\par  no TTP over AC joint\par  no TTP over humerus\par  no TTP over the spine of the scapula\par  no TTP over the medial border of the scapula, superior angle, inferior angle, lateral border\par  no TTP over supraspinatus\par  no TTP over infraspinatus\par  no TTP over upper trapezius\par  no TTP over deltoid muscle\par  no TTP in the axilla\par  neg crepitus with PROM shoulder\par  \par  AROM:\par  active range of motion full and painless, within functional limits\par  smooth, appropriate scapulohumeral rhythm\par  PROM consistent with above, painless\par  \par  Manual Muscle Testing\par  5/5 to resisted isometric testing in all planes\par  \par  neg sulcus sign\par  neg Neer test\par  neg Morel test\par  neg Hoa's test\par  neg Speed's test\par  neg Yergason's test\par  neg drop arm test\par  neg empty can test\par  neg external rotation lag sign\par  neg lift off sign\par  neg hornblower's sign\par  neg Horizontal adduction test\par  \par  Sensation to light touch intact over all dermatomes about the shoulder\par  Distal pulses present\par

## 2024-09-27 NOTE — ED PROVIDER NOTE - CLINICAL SUMMARY MEDICAL DECISION MAKING FREE TEXT BOX
Raúl Gil is a 85 y.o. female on day 4 of admission presenting with Acute hypoxic respiratory failure (Multi).      Subjective   Patient resting in bed comfortably. Denies SOB at rest       Objective     Last Recorded Vitals  BP (!) 110/48 (BP Location: Left arm, Patient Position: Lying) Comment: RN NOTIFIED  Pulse 108   Temp 36.7 °C (98.1 °F) (Temporal)   Resp 16   Wt 92.1 kg (203 lb)   SpO2 94%   Intake/Output last 3 Shifts:    Intake/Output Summary (Last 24 hours) at 9/27/2024 1631  Last data filed at 9/27/2024 1539  Gross per 24 hour   Intake 460 ml   Output 1400 ml   Net -940 ml       Admission Weight  Weight: 92.1 kg (203 lb) (09/23/24 1023)    Daily Weight  09/23/24 : 92.1 kg (203 lb)    Image Results  Transthoracic Echo (TTE) Limited              [place] [id number]              Phone [phone]    TRANSTHORACIC ECHOCARDIOGRAM REPORT    Patient Name:     RAÚL GIL        Reading Physician:  [id number] Paul Hamm DO  Study Date:       9/27/2024            Ordering Provider:  [id number] EDOUARD MCHUGH  MRN/PID:          [id number]             Fellow:  Accession#:       [id number]         Nurse:  Date of           1939 / 85 years Sonographer:        Edouard Umanzor RDCS  Birth/Age:  Gender:           F                    Additional Staff:  Height:           160.02 cm            Admit Date:  Weight:           92.08 kg             Admission Status:   Inpatient - Routine  BSA / BMI:        1.95 m2 / 35.96      Department          [facility]                    kg/m2                Location:  Blood Pressure: 110 /48 mmHg    Study Type:    TRANSTHORACIC ECHO (TTE) LIMITED  Diagnosis/ICD: Chronic diastolic (congestive) heart failure (CHF)-I50.32  Indication:    Congestive Heart Failure  CPT Codes:     Echo Limited-72881; Doppler  Full-21434; Color Doppler-15961    Patient History:  Pertinent History: CHF, AFIB, chest pain, HLD, HTN.    Study Detail: The following Echo studies were performed: 2D, M-Mode, Doppler and                color flow.       PHYSICIAN INTERPRETATION:  Left Ventricle: Left ventricular ejection fraction is normal, by visual estimate at 65-70%. There are no regional wall motion abnormalities. The left ventricular cavity size is normal. Spectral Doppler shows an impaired relaxation pattern of left ventricular diastolic filling.  Left Atrium: The left atrium is moderately dilated.  Right Ventricle: The right ventricle is normal in size. There is normal right ventricular global systolic function. A device is visualized in the right ventricle. Micra pacemaker implanted in RV.  Right Atrium: The right atrium is mild to moderately dilated.  Aortic Valve: The aortic valve appears abnormal. There is moderate aortic valve cusp calcification. There is mild aortic valve thickening. There is no evidence of aortic valve regurgitation.  Mitral Valve: The mitral valve is abnormal. There is severe mitral annular calcification. There is mild to moderate mitral valve regurgitation.  Tricuspid Valve: The tricuspid valve is structurally normal. There is mild tricuspid regurgitation. The Doppler estimated RVSP is mildly elevated right ventricular systolic pressure at 37.4 mmHg.  Pulmonic Valve: The pulmonic valve is structurally normal. There is no indication of pulmonic valve regurgitation.  Pericardium: No pericardial effusion noted.  Aorta: The aortic root is normal.       CONCLUSIONS:   1. Left ventricular ejection fraction is normal, by visual estimate at 65-70%.   2. Spectral Doppler shows an impaired relaxation pattern of left ventricular diastolic filling.   3. There is normal right ventricular global systolic function.   4. Micra pacemaker implanted in RV.   5. The left atrium is moderately dilated.   6. The right atrium is mild to  moderately dilated.   7. There is severe mitral annular calcification.   8. Mild to moderate mitral valve regurgitation.   9. Mildly elevated right ventricular systolic pressure.  10. There is moderate aortic valve cusp calcification.    QUANTITATIVE DATA SUMMARY:     LA VOLUME:                    Normal Ranges:  LA Vol A4C:        104.9 ml   (22+/-6mL/m2)  LA Vol A2C:        130.0 ml  LA Vol BP:         118.0 ml  LA Vol Index A4C:  53.9ml/m2  LA Vol Index A2C:  66.8 ml/m2  LA Vol Index BP:   60.6 ml/m2  LA Area A4C:       30.4 cm2  LA Area A2C:       34.2 cm2  LA Major Axis A4C: 7.5 cm  LA Major Axis A2C: 7.6 cm  LA Volume Index:   56.4 ml/m2  LA Vol A4C:        98.4 ml  LA Vol A2C:        124.0 ml  LA Vol Index BSA:  57.1 ml/m2       RA VOLUME BY A/L METHOD:            Normal Ranges:  RA Vol A4C:              68.6 ml    (8.3-19.5ml)  RA Vol Index A4C:        35.2 ml/m2  RA Area A4C:             24.2 cm2  RA Major Axis A4C:       7.3 cm       LV SYSTOLIC FUNCTION BY 2D PLANIMETRY (MOD):                       Normal Ranges:  EF-A4C View:    57 % (>=55%)  EF-A2C View:    60 %  EF-Biplane:     59 %  EF-Visual:      68 %  LV EF Reported: 68 %       LV DIASTOLIC FUNCTION:           Normal Ranges:  MV Peak E:             1.29 m/s  (0.7-1.2 m/s)  MV Peak A:             0.36 m/s  (0.42-0.7 m/s)  E/A Ratio:             3.63      (1.0-2.2)  MV e'                  0.071 m/s (>8.0)  MV lateral e'          0.07 m/s  MV medial e'           0.07 m/s  E/e' Ratio:            18.25     (<8.0)       MITRAL VALVE:          Normal Ranges:  MV DT:        190 msec (150-240msec)       AORTIC VALVE:          Normal Ranges:  LVOT Diameter: 2.00 cm (1.8-2.4cm)       RIGHT VENTRICLE:  RV Basal 4.03 cm  RV Mid   3.31 cm  RV Major 7.8 cm  TAPSE:   21.2 mm  RV s'    0.12 m/s       TRICUSPID VALVE/RVSP:          Normal Ranges:  Peak TR Velocity:     2.71 m/s  RV Syst Pressure:     37 mmHg  (< 30mmHg)  IVC Diam:             2.50 cm       [id number]  Paul Hamm   Electronically signed on 9/27/2024 at 2:21:08 PM       ** Final **      Physical Exam  Constitutional:       Appearance: Normal appearance.   Cardiovascular:      Rate and Rhythm: Normal rate and regular rhythm.      Pulses: Normal pulses.      Heart sounds: Normal heart sounds.   Pulmonary:      Effort: Pulmonary effort is normal.      Breath sounds: Normal breath sounds.   Abdominal:      General: Bowel sounds are normal.      Palpations: Abdomen is soft.   Musculoskeletal:         General: Normal range of motion.   Skin:     General: Skin is warm and dry.   Neurological:      Mental Status: She is alert.         Relevant Results             Results for orders placed or performed during the hospital encounter of 09/23/24 (from the past 24 hour(s))   Protime-INR   Result Value Ref Range    Protime 27.5 (H) 9.3 - 12.7 seconds    INR 2.8 (H) 0.9 - 1.2   Lactate   Result Value Ref Range    Lactate 1.2 0.4 - 2.0 mmol/L   Procalcitonin   Result Value Ref Range    Procalcitonin 0.11 (H) <=0.07 ng/mL   Transthoracic Echo (TTE) Limited   Result Value Ref Range    LVOT diam 2.00 cm    MV E/A ratio 3.63     Tricuspid annular plane systolic excursion 2.1 cm    LV Biplane EF 59 %    LA vol index A/L 60.6 ml/m2    LV EF 68 %    RV free wall pk S' 12.30 cm/s    RVSP 37.4 mmHg    LV A4C EF 56.9        Assessment/Plan                  Assessment & Plan  Acute hypoxic respiratory failure (Multi)  Pt is on 3 L nasal cannula normally does not wear oxygen at home no history of COPD asthma.   Pneumonia and fluid volume overload  -proalcitonin elevated original xray showed infiltrates, BNP elevated, seconcond xray shows pleural effusion and vascular congestion  wean patient from O2 as tolerated, SPO2 86% on room air with ambulation  Type 2 diabetes mellitus with obesity (Multi)  Not on any medication  Diastolic heart failure  Chronic shortness of breath with exertion  Last EF 55--60%   Lasix IV given x1 today  Follow-up  with Dr. Lozano as outpatient  Hypertension  Continue with home meds  Pneumonia of both lower lobes  Chest x-ray shows possible pneumonia bilaterally  Order blood cultures and sputum  Start on azithromycin and Rocephin    Longstanding persistent atrial fibrillation (Multi)  Patient on Coumadin therapy 2.5 INR  PT/INR daily  Plan of Care  Patient lives at home with family she plans to return there at discharge without any needs.    Plan of care discussed with patient and collaborating physician.                  Jazz Melton, APRN-CNP       54yo male with h/o IVDA, alcohol abuse, and hypothyroidism in ER requesting medication refill. Pt reports that he ran out of Synthroid a few days ago. Refill sent for 1 month. Pt recommended to f/u with PMD for future refills.

## 2024-10-09 NOTE — ED ADULT NURSE NOTE - CHIEF COMPLAINT
[TextEntry] : Sacrococcygeal: hirsute, palpable mass to the left buttock,  +pits along the midline, slightly tender to palpation,  no erythema, no gross signs of infection  Constitutional: NAD, well-nourished HENT: Normocephalic, atraumatic, PERRL, non-icteric sclerae, neck supple, trachea midline PULM: LSCTAB, no wheezing or rhonchi CV: RRR, no murmur, rubs, or gallops Abd: Soft, NT, ND, +BS, no palpable masses or bulge MSK: RITCHIE, 5/5 strength to all extremities Skin: No rash noted Neuro: A&O x 3, no FND Psych: Mood is appropriate The patient is a 58y Male complaining of abdominal pain.

## 2024-10-22 NOTE — ED ADULT NURSE NOTE - CAS DISCH BELONGINGS RETURNED
Patient stated she was told by [facility] Rybelsus was discontinued by provider.  Patient request medication to be sent in.     Not applicable

## 2024-12-12 NOTE — ED PROVIDER NOTE - INTERNATIONAL TRAVEL
-- DO NOT REPLY / DO NOT REPLY ALL --  -- Message is from [facility] ([facility]) --    General Patient Message: Patient is calling to schedule a new patient appointment. Patient  talked to doctor yesterday about patient become a new patient with doctor and doctor approved. Please call to schedule new patient appointment.     Caller Information       Type Contact Phone/Fax    03/09/2023 08:31 AM CST Phone (Incoming) Amira Hawkins (Self) [phone] (M)        Alternative phone number: [phone]  Work     Can a detailed message be left? Yes    Message Turnaround:     Is it Working Hours? Yes - Working Hours     [place]:    Please give this turnaround time to the caller:   \"This message will be sent to [state Provider's name]. The clinical team will fulfill your request as soon as they review your message.\"                 Bernadette Rubi is a 49 Ml Guevara, PAOneydaC  12/12/2024   oral No

## 2024-12-22 NOTE — ED ADULT NURSE NOTE - PATIENT IS UNABLE TO BE SCREENED DUE TO:
Recommend icing, use of Aleve once every 12 hours for pain and to decrease inflammation.  I have offered an orthopedic consult if you change your mind contact us and we can order this in the next day.  If symptoms worsen despite recommended plan please return for further evaluation.  X-rays were normal, no fractures or displacement of bones.  
Subjective   Patient ID: Ciara Davila with c/o chest pain. Initially started a week ago. She did have resolution however pain returned yesterday. Persistent pain today with associated left arm and jaw pain. She describes pain like a heaviness across chest. No SOB or palpitations. Nothing seems to make cough better or worse.           Past Medical History  Allergies as of 2024 - Reviewed 2024   Allergen Reaction Noted    Fish derived Anaphylaxis 2017    Peanut Anaphylaxis 2023    Shellfish containing products Anaphylaxis 2023    Prunes Swelling 2023    Cashew nut Swelling 2019    Latex Itching and Rash 2023    Tomato Rash 2023       (Not in a hospital admission)       Past Medical History:   Diagnosis Date    Encounter for gynecological examination (general) (routine) without abnormal findings 2016    Encounter for gynecological examination without abnormal finding    Encounter for other screening for malignant neoplasm of breast 2016    Breast screening    Pain in left foot 2014    Pain in left foot    Pelvic and perineal pain 2014    Pelvic pain in female       Past Surgical History:   Procedure Laterality Date     SECTION, CLASSIC  2013     Section    COLONOSCOPY  2014    Complete Colonoscopy        reports that she has never smoked. She has never used smokeless tobacco. She reports that she does not drink alcohol and does not use drugs.    Review of Systems  Review of Systems                               Objective    Vitals:    24 1628   BP: 128/86   BP Location: Right arm   Patient Position: Lying   Pulse: 96   Resp: 16   Temp: 36.6 °C (97.8 °F)   SpO2: 96%     No LMP recorded (lmp unknown). Patient is 
postmenopausal.    Physical Exam  Constitutional:       Appearance: Normal appearance.   HENT:      Head: Normocephalic and atraumatic.   Eyes:      Conjunctiva/sclera: Conjunctivae normal.   Cardiovascular:      Rate and Rhythm: Normal rate.      Heart sounds: No murmur heard.  Pulmonary:      Effort: Pulmonary effort is normal.      Breath sounds: No wheezing.   Skin:     General: Skin is warm and dry.   Neurological:      Mental Status: She is alert.         Procedures    Point of Care Test & Imaging Results from this visit      Diagnostic study results (if any) were reviewed by Kari Mcginnis PA-C.    Assessment/Plan   Allergies, medications, history, and pertinent labs/EKGs/Imaging reviewed by Kari Mcginnis PA-C.     Medical Decision Making  Ms. Davila presents to  with c/o CP over the past day. New onset arm and jaw pain. EKG obtained and SR without acute ST segment changes. Compared to old from 2024. Discussed with patient her symptoms are concerning for ACS and I am unable to rule this out in . Also discussed with attending Dr. jC Bowles DO. Will refer to ED for higher level of care. I did recommend EMS transport. Pt did refuse EMS transport due to cost. She did sign AMA form today. Notes she will drive herself. She does feel stable to drive herself. We did discuss risks of syncope, worsening pain etc. She reports she will pull over and call EMS if her symptoms progress in any way.     Orders and Diagnoses  Diagnoses and all orders for this visit:  Other chest pain  -     ECG 12 lead (Clinic Performed)      Medical Admin Record      Patient disposition: ED    Electronically signed by Kari Mcginnis PA-C  8:52 PM      
Patient refused

## 2025-01-30 NOTE — ED ADULT NURSE NOTE - CHIEF COMPLAINT
Care Due:                  Date            Visit Type   Department     Provider  --------------------------------------------------------------------------------                                EP -                              PRIMARY      [facility]  Last Visit: 01-      CARE ([facility])   MEDICINE       Jonah Dia  Next Visit: None Scheduled  None         None Found                                                            Last  Test          Frequency    Reason                     Performed    Due Date  --------------------------------------------------------------------------------    CBC.........  12 months..  allopurinoL..............  03- 02-    Lipid Panel.  12 months..  pravastatin..............  09- 09-    Uric Acid...  12 months..  allopurinoL..............  Not Found    Overdue    [employer] Embedded Care Due Messages. Reference number: [id number].   1/30/2025 6:10:43 AM CST   The patient is a 53y Male complaining of medication refill.

## 2025-02-19 NOTE — ED ADULT NURSE NOTE - SUICIDE SCREENING QUESTION 3
not take naps during the day.  Keep your bedroom quiet, dark, and cool.  Get regular exercise, but not within 3 to 4 hours of your bedtime..  Sleep on a comfortable pillow and mattress.  If watching the clock makes you anxious, turn it facing away from you so you cannot see the time.  If you worry when you lie down, start a worry book. Well before bedtime, write down your worries, and then set the book and your concerns aside.  Try meditation or other relaxation techniques before you go to bed.  If you cannot fall asleep, get up and go to another room until you feel sleepy. Do something relaxing. Repeat your bedtime routine before you go to bed again.  Make your house quiet and calm about an hour before bedtime. Turn down the lights, turn off the TV, log off the computer, and turn down the volume on music. This can help you relax after a busy day.  When should you call for help?  Watch closely for changes in your health, and be sure to contact your doctor if:  Your efforts to improve your sleep do not work.  Your insomnia gets worse.  You fall asleep during the day.             Where can you learn more?   Go to http://www.[employer].net/[facility]  Enter P513 in the search box to learn more about \"Insomnia: After Your Visit.\"   © 9133-4323 Healthwise, [employer]. Care instructions adapted under license by [employer] (which disclaims liability or warranty for this information). This care instruction is for use with your licensed healthcare professional. If you have questions about a medical condition or this instruction, always ask your healthcare professional. [employer] disclaims any warranty or liability for your use of this information.  Content Version: 8.9.69232; Last Revised: June 26, 2010    Drowsy Driving: The [place] National Highway Traffic Safety Administration cites drowsiness as a causing factor in more than 100,000 police reported crashes annually, resulting in 76,000 injuries and 1,500  No

## 2025-04-07 NOTE — ED ADULT NURSE NOTE - NSFALLRSKUNASSIST_ED_ALL_ED
"  Latoya Le RN  [facility]  Utilization Management  P:[phone]  F:[phone]     Admitted 4/4/25  Obs-IP on 4/5/25  Fax 2 of 2       Philomena Davis (61 y.o. Female)       Date of Birth   1963    Social Security Number       Address   [address] [address]    Home Phone   [phone]    MRN   [id number]       [employer]   [facility]    Marital Status   Single                            Admission Date   4/4/2025    Admission Type   Urgent    Admitting Provider   Carl Tuttle MD    Attending Provider   Yecenia Clifford MD    Department, Room/Bed   [facility] 2A ICU, N203/1       Discharge Date       Discharge Disposition       Discharge Destination                                 Attending Provider: Yecenia Clifford MD    Allergies: Buspirone, Codeine, Mirtazapine    Isolation: None   Infection: None   Code Status: CPR    Ht: 165.1 cm (65\")   Wt: 90.6 kg (199 lb 11.8 oz)    Admission Cmt: None   Principal Problem: Sepsis secondary to UTI [A41.9,N39.0]                   Active Insurance as of 4/4/2025       Primary Coverage       Payor Plan Insurance Group Employer/Plan Group    [employer] [place] [employer]ICA[employer]        Payor Plan Address Payor Plan Phone Number Payor Plan Fax Number Effective Dates    [address] [address] [phone]  11/8/2018 - None Entered    [facility] [id number]         Subscriber Name Subscriber Birth Date Member ID       PHILOMENA DAVIS 1963 [id number]                   Physician Progress Notes (all)        Yecenia Clifford MD at 04/07/25 1325          Critical Care Note     LOS: 2 days   Patient Care Team:  Unique Brandt DO as PCP - General (Family Medicine)  Leana Garcia PA as Physician Assistant (Gastroenterology)  Judy Curry LPCC as  (Behavioral Health)    Chief Complaint/Reason for visit:    Chief Complaint   Patient presents with    Abnormal Lab     Sepsis " "secondary to UTI    Alcoholic cirrhosis of liver without ascites    Alcohol use disorder    Peripheral neuropathy    Acute UTI (urinary tract infection)    Pneumonia    Acute kidney injury/  Hepatorenal syndrome    Subjective     Interval History:     She is afebrile.  She is maintaining sinus rhythm but had some episodes of paroxysmal atrial fibrillation yesterday.  She remains on phenylephrine 2 mics per kilogram per minute to support her blood pressure.  On 4 L nasal cannula saturation is 95% - 100%.  Only 530 mL of urine yesterday.  She has had 4 stools today with lactulose and it is currently on hold.  She remains somnolent.    Review of Systems:    All systems were reviewed and negative except as noted in subjective.    Medical history, surgical history, social history, family history reviewed    Objective     Intake/Output:    Intake/Output Summary (Last 24 hours) at 4/7/2025 1325  Last data filed at 4/7/2025 1148  Gross per 24 hour   Intake 3454.2 ml   Output 580 ml   Net 2874.2 ml       Nutrition:  NPO Diet NPO Type: Sips with Meds    Infusions:  phenylephrine, 0.5-3 mcg/kg/min, Last Rate: 2 mcg/kg/min (04/07/25 0961)  phenylephrine, 0.5-3 mcg/kg/min, Last Rate: 2 mcg/kg/min (04/07/25 0521)  vasopressin, 0.03 Units/min, Last Rate: Stopped (04/06/25 1807)        Mechanical Ventilator Settings:                                                Telemetry: Sinus rhythm             Vital Signs  Blood pressure 103/57, pulse 76, temperature 97.6 °F (36.4 °C), temperature source Oral, resp. rate 24, height 165.1 cm (65\"), weight 90.6 kg (199 lb 11.8 oz), last menstrual period 01/22/2016, SpO2 100%.    Physical Exam:  General Appearance:  Middle-aged woman semiupright in bed sleeping   Head:     Eyes:             Ears:     Throat:    Neck: Trachea midline, no crepitus   Back:      Lungs:   Decreased breath sounds lower lobes bilaterally, clear anteriorly    Heart:  Regular rhythm, S1, S2 auscultated    Abdomen:   " distended with a fluid wave   Rectal:   Deferred   Extremities: 1-2+ edema no visible rash   Pulses:    Skin: No visible rash   Lymph nodes:    Neurologic: Sleeping      Results Review:     I reviewed the patient's new clinical results.   Results from last 7 days   Lab Units 04/07/25  0505 04/06/25  1130 04/05/25  0842 04/04/25  1724   SODIUM mmol/L 133* 134* 135* 135*   POTASSIUM mmol/L 4.1 3.8 4.0 4.2   CHLORIDE mmol/L 103 103 105 103   CO2 mmol/L 18.0* 19.0* 22.0 24.0   BUN mg/dL 22 19 15 14   CREATININE mg/dL 1.57* 1.40* 1.10* 1.11*   CALCIUM mg/dL 8.4* 8.7 8.6 8.8   BILIRUBIN mg/dL  --  7.6* 8.2* 8.5*   ALK PHOS U/L  --  88 84 101   ALT (SGPT) U/L  --  32 35* 36*   AST (SGOT) U/L  --  154* 174* 186*   GLUCOSE mg/dL 148* 104* 97 95     Results from last 7 days   Lab Units 04/07/25  0505 04/06/25  1130 04/05/25  1838 04/05/25  0842   WBC 10*3/mm3 20.88* 13.48*  --  16.88*   HEMOGLOBIN g/dL 8.2* 7.3* 7.6* 8.0*   HEMATOCRIT % 25.9* 23.0* 23.9* 25.1*   PLATELETS 10*3/mm3 258 148  --  171         Lab Results   Component Value Date    BLOODCX No growth at 2 days 04/04/2025    BLOODCX No growth at 2 days 04/04/2025     Lab Results   Component Value Date    URINECX >100,000 CFU/mL Escherichia coli (A) 04/04/2025       I reviewed the patient's new imaging including images and reports.  CT CHEST WO CONTRAST DIAGNOSTIC    Date of Exam: 4/5/2025 10:26 PM EDT    Indication: pna vs edema.    Comparison: Chest radiograph 4/4/2025 and chest CT [address] 22.    Technique: Axial CT images were obtained of the chest without contrast administration.  Reconstructed coronal and sagittal images were also obtained. Automated exposure control and iterative construction methods were used.      Findings:  The thyroid, trachea and esophagus appear within normal limits. The heart is enlarged. There is mild coronary artery calcification. Diminished attenuation of the blood pool suggests anemia. There are mildly enlarged mediastinal lymph nodes  including a  right paratracheal lymph node on axial image 33 measuring 10 mm in the short axis. There is mild aortic and aortic branch vessel atherosclerosis. No pericardial effusion.    There are moderate bilateral pleural effusions with significant passive atelectasis affecting the entire left lower lobe and a large portion of the right lower lobe. There is patchy bilateral airspace disease most pronounced at the lung periphery along  with intermixed interstitial thickening. This is favored to reflect pneumonia, but pulmonary edema could have a similar appearance. Correlate with laboratory values and symptoms to differentiate. Airways are patent.    There are bilateral breast implants. There is body wall edema. The liver is enlarged and infiltrated with fat. Liver margins also appear lobulated worrisome for cirrhosis. The spleen is enlarged measuring 12.2 x 8.8 cm in the axial plane. No acute  findings in the upper abdomen. Mild spinal degenerative changes. No acute osseous abnormality.   Impression:     Impression:  1.Moderate bilateral pleural effusions with significant passive atelectasis affecting the entire left lower lobe and a large portion of the right lower lobe.  2.Patchy bilateral airspace disease and interstitial thickening. This is favored to reflect pneumonia, but pulmonary edema could have a similar appearance. Correlate with laboratory values and symptoms to differentiate.  3.Cardiomegaly and coronary artery disease.  4.Hepatosplenomegaly and hepatic steatosis. Lobulated liver margins worrisome for cirrhosis.  5.Body wall edema.            Electronically Signed: Ravi Gordon MD   4/6/2025 3:27 AM EDT       Interpretation SummaryEchocardiogram April 6, 2025         Left ventricular systolic function is normal. Automated 2D EF = 63.4%    Left atrial volume is severely increased.    Estimated right ventricular systolic pressure from tricuspid regurgitation is moderately elevated (45-55 mmHg).  Calculated right ventricular systolic pressure from tricuspid regurgitation is 47 mmHg.        All medications reviewed.   desvenlafaxine, 50 mg, Oral, Daily  folic acid, 1 mg, Oral, Daily  gabapentin, 200 mg, Oral, Q12H  hydrocortisone sodium succinate, 100 mg, Intravenous, Q8H  ipratropium-albuterol, 3 mL, Nebulization, 4x Daily - RT  lactobacillus acidophilus, 1 capsule, Oral, Daily  lactulose, 10 g, Oral, Daily  latanoprost, 1 drop, Both Eyes, Nightly  [Held by provider] metoprolol succinate XL, 25 mg, Oral, Daily  midodrine, 10 mg, Oral, TID AC  mupirocin, 1 Application, Each Nare, BID  pantoprazole, 40 mg, Oral, Daily  piperacillin-tazobactam, 3.375 g, Intravenous, Q8H  QUEtiapine, 100 mg, Oral, Nightly  rifAXIMin, 550 mg, Oral, Q12H  rosuvastatin, 5 mg, Oral, Daily  sodium chloride, 10 mL, Intravenous, Q12H  sodium chloride, 10 mL, Intravenous, Q12H  sodium chloride, 10 mL, Intravenous, Q12H  thiamine, 100 mg, Oral, Daily  ursodiol, 300 mg, Oral, BID          Assessment & Plan       Sepsis secondary to UTI    Alcoholic cirrhosis of liver without ascites    Alcohol use disorder    Peripheral neuropathy    Acute UTI (urinary tract infection)    Pneumonia    Acute kidney injury    61-year-old woman with hypertension, chronic pain, liver cirrhosis, COPD hospitalized in mid March with alcoholic hepatitis and cirrhosis.  She also had a third metatarsal fracture.  She was [facility] to [facility] for [facility].  She returned to [facility] April 4 with elevated white count fever and hypotension.  Urinalysis was consistent with a urinary tract infection.  Respiratory panel PCR was negative.  Chest x-ray revealed left basilar opacity and effusion, possible edema.  She was placed on vancomycin and Zosyn.  She became hypotensive, Unresponsive to volume resuscitation and albumin.  She was placed on phenylephrine because of resting tachycardia.      Hemoglobin had dropped to 7.6 and she had a history  of a recent GI bleed so she was given a unit of packed red cells.  Today hemoglobin is 8.2.  Renal function has worsened and is currently creatinine of 1.57, compared to 1.14 on admission.  With her cirrhosis and gross total body overload with ascites and edema, I am concerned about possible hepatorenal syndrome.    #1 sepsis, suspected urinary source urine cultures positive for E. coli resistant to ampicillin.  She also has basilar infiltrates that may be compressive atelectasis from effusions versus pneumonia.  She is currently on Zosyn but I can downgrade her to ceftriaxone.  White count has remained 20,000, but she is afebrile.  She continues to require vasopressors, phenylephrine 2 mics per kilogram per minute.  Her serum cortisol was technically normal at 6.47 but that is inappropriate for her stress and hypotension.    -Support blood pressure with phenylephrine   -initiate midodrine   -stress steroids  -Transition Zosyn to Rocephin    #2 cirrhosis with ascites, secondary to ETOH, recent GI bleed.She has bilateral effusions, ascites, hepatomegaly and body wall edema.  Monitor    - H&H and transfuse if less than 7.  She did have a recent gastric ulcer.  -EGD tomorrow  - Rifaximin  - thiamine, folic acid  -Check coagulation studies    #3 acute kidney injury    -Possibly hypotension from sepsis  - Need to consider hepatorenal syndrome  -Will ask nephrology to evaluate          VTE Prophylaxis:SCDS    Stress Ulcer Prophylaxis:protonix    Yecenia Clifford MD  04/07/25  13:25 EDT      Time: Critical care 40 min  I personally provided care to this critically ill patient as documented above.  Critical care time does not include time spent on separately billed procedures.  None of my critical care time was concurrent with other critical care providers.     Electronically signed by Yecenia Clifford MD at 04/07/25 1107       Brunner, Mark I, MD at 04/06/25 3866          Patient was transferred to ICU for  persistent hypotension, not responsive to fluids and albumin.  There were initial plans for surveillance EGD tomorrow to follow-up gastric ulcer healing.  EGD may be prudent for worsening anemia as well.  Will reevaluate in a.m. to determine if patient is stable for such.        Electronically signed by Brunner, Mark I, MD at 04/06/25 0834       Carl Tuttle MD at 04/06/25 1027            Intensive Care Follow-up     Hospital:  LOS: 1 day   Ms. Philomena Davis, 61 y.o. female is followed for:   Sepsis secondary to UTI     Subjective   Subjective   Interval History:  Philomena Davis is a 61 y.o. female with past medical history significant for hypertension, hyperlipidemia, chronic pain disorder, cirrhosis of the liver, COPD, GERD. Patient was admitted to [facility] from 3/12/25 to 3/19/25 with alcoholic hepatitis with cirrhosis, electrolyte abnormalities, and Lisfranc and third metatarsal fracture. She was discharged to [facility] for rehab.     Patient returned to [facility] on 4/4/25 due to elevated WBC and fevers. UA was consistent with UTI. Respiratory PCR negative. CXR with mild perihilar interstitial opacities, left basilar opacity with likely small left pleural effusion. Patient was started on vancomycin and Zosyn.     Since arrival, patient has been hypotensive. She has received IVF resuscitations as well as albumin. Patient had drop in hemoglobin to 7.6. Fecal occult positive. GI is following and plans for EGD on Monday.     Due to ongoing hypotension despite IVF and albumin, decision was made to transfer patient to ICU. On arrival, patient is tolerating 3L NC with 97% O2 saturation. SBP in 80s. She remains on Zosyn for UTI and pneumonia. Urine and respiratory cultures pending.      Time spent 8 minutes  Electronically signed by Eileen Carvalho PA-C, 04/06/25, 10:43 AM EDT.     The patient was seen upon arrival to the intensive care unit.  She is alert and oriented without specific complaints of pain or  discomfort.  She does note that she is very anxious currently and she is worried that she is going to get sicker.  I do note that recently she has had a severely depressed cortisol in times of stress which may indicate relative adrenal insufficiency.  Her current repeat cortisol from this morning is 6.4 which I believe is an adequate for the level of duress she is under.    The patient's past medical, surgical and social history were reviewed and updated in Epic as appropriate.     Objective   Objective     Infusions:  phenylephrine, 0.5-3 mcg/kg/min, Last Rate: 0.5 mcg/kg/min (04/06/25 1114)      Medications:  desvenlafaxine, 50 mg, Oral, Daily  folic acid, 1 mg, Oral, Daily  gabapentin, 200 mg, Oral, Q12H  hydrocortisone sodium succinate, 100 mg, Intravenous, Q8H  ipratropium-albuterol, 3 mL, Nebulization, 4x Daily - RT  lactobacillus acidophilus, 1 capsule, Oral, Daily  lactulose, 10 g, Oral, Daily  latanoprost, 1 drop, Both Eyes, Nightly  [Held by provider] metoprolol succinate XL, 25 mg, Oral, Daily  midodrine, 10 mg, Oral, TID AC  mupirocin, 1 Application, Each Nare, BID  pantoprazole, 40 mg, Oral, Daily  piperacillin-tazobactam, 3.375 g, Intravenous, Q8H  QUEtiapine, 100 mg, Oral, Nightly  rifAXIMin, 550 mg, Oral, Q12H  rosuvastatin, 5 mg, Oral, Daily  sodium chloride, 10 mL, Intravenous, Q12H  sodium chloride, 10 mL, Intravenous, Q12H  sodium chloride, 10 mL, Intravenous, Q12H  thiamine, 100 mg, Oral, Daily  ursodiol, 300 mg, Oral, BID        Vital Sign Min/Max for last 24 hours  Temp  Min: 97.3 °F (36.3 °C)  Max: 98.1 °F (36.7 °C)   BP  Min: 45/23  Max: 100/55   Pulse  Min: 88  Max: 114   Resp  Min: 16  Max: 20   SpO2  Min: 89 %  Max: 99 %   Flow (L/min) (Oxygen Therapy)  Min: 1  Max: 3       Input/Output for last 24 hour shift  04/05 0701 - 04/06 0700  In: 2741 [P.O.:385; I.V.:1556]  Out: 550 [Urine:550]      Objective:  General Appearance:  Uncomfortable, ill-appearing, in no acute distress and not in  "pain.    Vital signs: (most recent): Blood pressure (!) 82/51, pulse 95, temperature 97.6 °F (36.4 °C), temperature source Oral, resp. rate 20, height 165.1 cm (65\"), weight 90.6 kg (199 lb 11.2 oz), last menstrual period 01/22/2016, SpO2 92%.    HEENT: Normal HEENT exam.    Lungs:  Normal effort and normal respiratory rate.  There are decreased breath sounds.  No rales, wheezes or rhonchi.    Heart: Normal rate.  Regular rhythm.  S1 normal and S2 normal.  No murmur.   Chest: Symmetric chest wall expansion.   Abdomen: Abdomen is soft.  There are no signs of ascites.  (Patient does have abdominal wall edema with some blister formation.).  Bowel sounds are normal.   There is no abdominal tenderness.   There is no mass.   Extremities: Normal range of motion.  There is dependent edema.    Neurological: Patient is alert and oriented to person, place and time.    Skin:  Warm.  No rash.               Results from last 7 days   Lab Units 04/06/25  1130 04/05/25  1838 04/05/25  0842 04/04/25  2341 04/04/25  1724   WBC 10*3/mm3 13.48*  --  16.88*  --  23.03*   HEMOGLOBIN g/dL 7.3* 7.6* 8.0*   < > 8.1*   PLATELETS 10*3/mm3 148  --  171  --  221    < > = values in this interval not displayed.     Results from last 7 days   Lab Units 04/06/25  1130 04/05/25  0842 04/04/25  1724   SODIUM mmol/L 134* 135* 135*   POTASSIUM mmol/L 3.8 4.0 4.2   CO2 mmol/L 19.0* 22.0 24.0   BUN mg/dL 19 15 14   CREATININE mg/dL 1.40* 1.10* 1.11*   MAGNESIUM mg/dL 2.0 2.0  --    PHOSPHORUS mg/dL 3.1  --   --    GLUCOSE mg/dL 104* 97 95     Estimated Creatinine Clearance: 46.3 mL/min (A) (by C-G formula based on SCr of 1.4 mg/dL (H)).            I reviewed the patient's results and images.     Assessment & Plan   Impression        Sepsis secondary to UTI    Alcoholic cirrhosis of liver without ascites    Alcohol use disorder    Peripheral neuropathy    Acute UTI (urinary tract infection)    Pneumonia    Acute kidney injury       Plan        Patient " will be monitored closely in the intensive care unit for any worsening.  Given that she does have a low cortisol and is currently requiring pressors to maintain her blood pressure, I suspect that she may have some relative adrenal insufficiency given her overall level illness.  We will go ahead and start Solu-Cortef for now.  Fluids as needed.  Wean oxygen as tolerated.  Not currently in respiratory distress.  She does have what appears to be some infiltrates in the bilateral lungs which could be consistent with pneumonia versus pulmonary edema.  I suspect that there is likely a attribution from both.  We will continue on with Zosyn for now to cover both possible pneumonia and her underlying urinary tract infection.  Monitor renal function closely.  We will watch for signs of alcohol withdrawal.  This patient remains at very high risk of worsening.    Plan of care and goals reviewed with mulitdisciplinary/antibiotic stewardship team during rounds.   I discussed the patient's findings and my recommendations with patient, nursing staff, and primary care team     High level of risk due to:  decision regarding escalation of level of hospital care.        Carl Tuttle MD, [facility]  Pulmonology and Critical Care Medicine        Electronically signed by Carl Tuttle MD at 25 1344       Rosana Ibrahim MD at 25 0836              [facility] Medicine Services  PROGRESS NOTE    Patient Name: Philomena Davis  : 1963  MRN: [id number]    Date of Admission: 2025  Primary Care Physician: Unique Brandt DO    Subjective   Subjective     CC:  Pneumonia/sepsis    HPI:   - Patient tells me she is short of breath and coughing.  Nonproductive.  She is a little confused and has not had a bowel movement since yesterday morning.  I reordered lactulose for her.  We discussed her CODE STATUS and she was confused and unable to clearly state her wishes.  She is listed as a  full code.  She told me she did not want to be on life support machines but then told me she wanted CPR.  She was back and forth with her nurse as a witness today and we decided to attempt conversation again later after she has had her lactulose and hopefully has had a bowel movement.  She remains hypotensive despite fluids.  Will add albumin.  Patient tells me she has not been out of bed in about a week.    4/6 -patient continues to have severe hypotension despite fluids and albumin yesterday.  Hemoglobin dropped to 7.6.  GI consulted and planning EGD surveillance on Monday.  However given crystalloid refractory severe hypotension will discuss transfer to ICU with intensivist today.  She was able to have 2 bowel movements after the lactulose yesterday.  Mortality risk is high given her MELD score      Objective   Objective     Vital Signs:   Temp:  [97.3 °F (36.3 °C)-98.1 °F (36.7 °C)] 98 °F (36.7 °C)  Heart Rate:  [] 95  Resp:  [16-20] 20  BP: (74-98)/(42-60) 87/47  Flow (L/min) (Oxygen Therapy):  [1-3] 2     Physical Exam:  Constitutional: No acute distress, awake, alert  HENT: NCAT, mucous membranes moist  Respiratory: Decreased bilaterally, respiratory effort normal   Cardiovascular: RRR  Gastrointestinal: Positive bowel sounds, soft, nontender, mildly distended  Musculoskeletal: Positive bilateral lower extremity edema  Psychiatric: Appropriate affect, cooperative  Neurologic: Oriented to person and place, generally weak in all extremities, Cranial Nerves grossly intact to confrontation, speech clear  Skin: Significant jaundice      Results Reviewed:  LAB RESULTS:      Lab 04/05/25  1838 04/05/25  0842 04/04/25  2341 04/04/25  1724   WBC  --  16.88*  --  23.03*   HEMOGLOBIN 7.6* 8.0* 8.5* 8.1*   HEMATOCRIT 23.9* 25.1* 26.2* 25.1*   PLATELETS  --  171  --  221   NEUTROS ABS  --  13.54*  --  18.87*   IMMATURE GRANS (ABS)  --  0.12*  --  0.16*   LYMPHS ABS  --  1.55  --  1.61   MONOS ABS  --  1.46*  --   2.09*   EOS ABS  --  0.14  --  0.19   MCV  --  107.7*  --  104.6*   PROCALCITONIN  --   --   --  0.62*   LACTATE  --   --   --  1.3   PROTIME  --  21.6*  --   --          Lab 04/05/25  0842 04/04/25  1724   SODIUM 135* 135*   POTASSIUM 4.0 4.2   CHLORIDE 105 103   CO2 22.0 24.0   ANION GAP 8.0 8.0   BUN 15 14   CREATININE 1.10* 1.11*   EGFR 56.9* 56.3*   GLUCOSE 97 95   CALCIUM 8.6 8.8   MAGNESIUM 2.0  --          Lab 04/05/25  0842 04/04/25  1724   TOTAL PROTEIN 5.9* 6.3   ALBUMIN 2.5* 2.6*   GLOBULIN 3.4 3.7   ALT (SGPT) 35* 36*   AST (SGOT) 174* 186*   BILIRUBIN 8.2* 8.5*   ALK PHOS 84 101   LIPASE  --  43         Lab 04/05/25  0842   PROTIME 21.6*   INR 1.75*             Lab 04/04/25  2245 04/04/25  1724   IRON  --  65   IRON SATURATION (TSAT)  --  84*   TIBC  --  77*   TRANSFERRIN  --  52*   FERRITIN  --  926.40*   FOLATE >20.00  --    VITAMIN B 12 1,630*  --          Brief Urine Lab Results  (Last result in the past 365 days)        Color   Clarity   Blood   Leuk Est   Nitrite   Protein   CREAT   Urine HCG        04/05/25 1039 Dark Yellow   Turbid   Large (3+)   Moderate (2+)   Positive   30 mg/dL (1+)                   Microbiology Results Abnormal       Procedure Component Value - Date/Time    Urine Culture - Urine, Straight Cath [[id number]]  (Abnormal) Collected: 04/04/25 1953    Lab Status: Preliminary result Specimen: Urine from Straight Cath Updated: 04/05/25 1101     Urine Culture >100,000 CFU/mL Escherichia coli    Narrative:      Colonization of the urinary tract without infection is common. Treatment is discouraged unless the patient is symptomatic, pregnant, or undergoing an invasive urologic procedure.            CT Chest Without Contrast Diagnostic  Result Date: 4/6/2025  CT CHEST WO CONTRAST DIAGNOSTIC Date of Exam: 4/5/2025 10:26 PM EDT Indication: pna vs edema. Comparison: Chest radiograph 4/4/2025 and chest CT [id number] 22. Technique: Axial CT images were obtained of the chest without contrast  administration.  Reconstructed coronal and sagittal images were also obtained. Automated exposure control and iterative construction methods were used. Findings: The thyroid, trachea and esophagus appear within normal limits. The heart is enlarged. There is mild coronary artery calcification. Diminished attenuation of the blood pool suggests anemia. There are mildly enlarged mediastinal lymph nodes including a right paratracheal lymph node on axial image 33 measuring 10 mm in the short axis. There is mild aortic and aortic branch vessel atherosclerosis. No pericardial effusion. There are moderate bilateral pleural effusions with significant passive atelectasis affecting the entire left lower lobe and a large portion of the right lower lobe. There is patchy bilateral airspace disease most pronounced at the lung periphery along with intermixed interstitial thickening. This is favored to reflect pneumonia, but pulmonary edema could have a similar appearance. Correlate with laboratory values and symptoms to differentiate. Airways are patent. There are bilateral breast implants. There is body wall edema. The liver is enlarged and infiltrated with fat. Liver margins also appear lobulated worrisome for cirrhosis. The spleen is enlarged measuring 12.2 x 8.8 cm in the axial plane. No acute findings in the upper abdomen. Mild spinal degenerative changes. No acute osseous abnormality.     Impression: Impression: 1.Moderate bilateral pleural effusions with significant passive atelectasis affecting the entire left lower lobe and a large portion of the right lower lobe. 2.Patchy bilateral airspace disease and interstitial thickening. This is favored to reflect pneumonia, but pulmonary edema could have a similar appearance. Correlate with laboratory values and symptoms to differentiate. 3.Cardiomegaly and coronary artery disease. 4.Hepatosplenomegaly and hepatic steatosis. Lobulated liver margins worrisome for cirrhosis. 5.Body  wall edema. Electronically Signed: Ravi Gordon MD  4/6/2025 3:27 AM EDT  Workstation ID: [id number]    XR Chest 1 View  Result Date: 4/4/2025  XR CHEST 1 VW Date of Exam: 4/4/2025 5:20 PM EDT Indication: cough Comparison: 3/12/2025 Findings: No PICC is seen. Borderline cardiomegaly. Mild perihilar interstitial opacities may reflect mild edema. Left basilar opacity with likely small left pleural effusion. No pneumothorax. No evidence of acute osseous abnormalities. Visualized upper abdomen is  unremarkable.     Impression: Impression: 1.Mild perihilar and interstitial opacities may reflect mild edema. 2.Left basilar opacity with likely small left pleural effusion may reflect infection or atelectasis. 3.No PICC is seen. Electronically Signed: Alon Beauchamp MD  4/4/2025 5:46 PM EDT  Workstation ID: [id number]      Results for orders placed during the hospital encounter of 11/10/24    Adult Transthoracic Echo Complete W/ Cont if Necessary Per Protocol    Interpretation Summary    Left ventricular systolic function is normal. Estimated left ventricular EF = 65%    The cardiac valves are anatomically and functionally normal.      Current medications:  Scheduled Meds:desvenlafaxine, 50 mg, Oral, Daily  folic acid, 1 mg, Oral, Daily  gabapentin, 200 mg, Oral, Q12H  ipratropium-albuterol, 3 mL, Nebulization, 4x Daily - RT  lactobacillus acidophilus, 1 capsule, Oral, Daily  lactulose, 10 g, Oral, Daily  latanoprost, 1 drop, Both Eyes, Nightly  metoprolol succinate XL, 25 mg, Oral, Daily  midodrine, 10 mg, Oral, TID AC  pantoprazole, 40 mg, Oral, Daily  piperacillin-tazobactam, 3.375 g, Intravenous, Q8H  QUEtiapine, 100 mg, Oral, Nightly  rifAXIMin, 550 mg, Oral, Q12H  rosuvastatin, 5 mg, Oral, Daily  sodium chloride, 10 mL, Intravenous, Q12H  thiamine, 100 mg, Oral, Daily  ursodiol, 300 mg, Oral, BID      Continuous Infusions:     PRN Meds:.  senna-docusate sodium **AND** polyethylene glycol **AND** bisacodyl **AND**  bisacodyl    Calcium Replacement - Follow Nurse / BPA Driven Protocol    HYDROcodone-acetaminophen    ipratropium-albuterol    Magnesium Standard Dose Replacement - Follow Nurse / BPA Driven Protocol    nitroglycerin    Phosphorus Replacement - Follow Nurse / BPA Driven Protocol    Potassium Replacement - Follow Nurse / BPA Driven Protocol    [COMPLETED] Insert Peripheral IV **AND** sodium chloride    sodium chloride    sodium chloride    Assessment & Plan   Assessment & Plan     Active Hospital Problems    Diagnosis  POA    **Sepsis secondary to UTI [A41.9, N39.0]  Yes    Acute UTI (urinary tract infection) [N39.0]  Unknown    Pneumonia [J18.9]  Unknown    Sepsis [A41.9]  Unknown    Hypotension [I95.9]  Yes    Peripheral neuropathy [G62.9]  Yes    Alcohol use disorder [F10.90]  Yes    Alcoholic cirrhosis of liver without ascites [K70.30]  Yes    Essential hypertension [I10]  Yes    Generalized anxiety disorder [F41.1]  Yes    Hyperlipidemia [E78.5]  Yes    Glaucoma [H40.9]  Yes      Resolved Hospital Problems    Diagnosis Date Resolved POA    Dysphagia [R13.10] 04/04/2025 Yes        Brief Hospital Course to date:  Philomena Davis is a 61 y.o. female with a history of HTN, HLD, chronic pain disorder, cirrhosis of the liver, COPD, GERD, for fracture, was here 3/12/2025 - 3/19/2025 with EtOH hepatitis with cirrhosis, electrolyte abnormalities, Lisfranc and third metatarsal fracture, and was discharged from here to [facility] for rehab.  Patient is here with sepsis, pneumonia, and a UTI.     Assessment and Plan:     Acute respiratory failure with hypoxia  Sepsis  Crystalloid refractory hypotension  Pneumonia (POA)-HCAP  --Chest x-ray shows mild perihilar and interstitial opacities may reflect mild edema.  Left basilar opacity with likely small left pleural effusion may reflect infection or atelectasis.  -- Respiratory panel PCR negative  -- WBC 23.03, procalcitonin 0.62  -- O2 sat 87% on room air  -- was given a liter  of saline in the ED  -- BC x 2 pending  -- MRSA PCR pending  -- Urine Legionella and S.  Pneumo Ag pending  -- Sputum culture pending  -- Continuous pulse ox with supplemental oxygen as needed  -- Continue Zosyn, discontinue vancomycin as MRSA PCR negative  -- CT of the chest ordered on 4/5/2025: Pneumonia, atelectasis, effusions  --Left message for intensivist regarding possible transfer to ICU     Acute UTI (present on admission)  -- UA: Appearance turbid, blood large, nitrite positive, leukocytes large, protein 100, bilirubin large, RBC 3-5, WBC TNTC, bacteria 4+, squamous 7-12  -- Urine culture pending/requested straight cath UA  -- zosyn     Elevated serum creatinine  Crystalloid refractory hypotension  -- Creatinine 1.11  -- Baseline creatinine ~ 0.7-0.9  -- was given a liter of saline in the ED  --Additional bolus ordered 4/5/2025 followed by normal saline maintenance  -- Given albumin x 3 on 4/5/2025 given crystalloid refractory hypotension     Elevated LFTs/hyperbilirubinemia  Coagulopathy  EtOH hepatitis with cirrhosis, appears decompensated  Hepatic encephalopathy  Hypotension  -- Alk phos 101, , ALT 36, bili 8.5  -- Continue midodrine 3 times daily  -- High-protein diet  --MELD score 25  --GI consult requested  --INR 1.75  --Resume lactulose     Acute on chronic anemia  -- Hemoglobin 8.5-8 0.0-7.6  --Blood transfusion ordered for 6/20/2025  -- Likely related to EtOH, history of ulcer, marrow suppression  -- PPI   -- GI consulted and planning EGD on Monday     Lisfranc and third metatarsal fracture  Unsteady gait  Frequent falls  EtOH neuropathy  -- NWB RLE  -- PT/OT consult  -- Consult case management     EtOH abuse  -- Continue thiamine and folic acid  -- No alcohol since previous admission     HTN  HLD  -- Hold metoprolol given hypotension  -- midodrine dose 3 times daily for orthostasis  -- Continue statin     Depression  -- Continue Pristiq     Peripheral neuropathy  -- Continue gabapentin      Glaucoma  -- Home eyedrops    Expected Discharge Location and Transportation: rehab  Expected Discharge   Expected Discharge Date: 2025; Expected Discharge Time:      VTE Prophylaxis:  Mechanical VTE prophylaxis orders are present.         AM-PAC 6 Clicks Score (PT): 10 (25)    CODE STATUS:   Code Status and Medical Interventions: CPR (Attempt to Resuscitate); Full Support   Ordered at: 25 0109     Code Status (Patient has no pulse and is not breathing):    CPR (Attempt to Resuscitate)     Medical Interventions (Patient has pulse or is breathing):    Full Support     Level Of Support Discussed With:    Patient       Rosana Ibrahim MD  25        Electronically signed by Rosana Ibrahim MD at 25 0848       Rosana Ibrahim MD at 25 0921              [facility] Medicine Services  PROGRESS NOTE    Patient Name: Philomena Davis  : 1963  MRN: [id number]    Date of Admission: 2025  Primary Care Physician: Unique Brandt DO    Subjective   Subjective     CC:  Pneumonia    HPI:  Patient tells me she is short of breath and coughing.  Nonproductive.  She is a little confused and has not had a bowel movement since yesterday morning.  I reordered lactulose for her.  We discussed her CODE STATUS and she was confused and unable to clearly state her wishes.  She is listed as a full code.  She told me she did not want to be on life support machines but then told me she wanted CPR.  She was back and forth with her nurse as a witness today and we decided to attempt conversation again later after she has had her lactulose and hopefully has had a bowel movement.  She remains hypotensive despite fluids.  Will add albumin.  Patient tells me she has not been out of bed in about a week.      Objective   Objective     Vital Signs:   Temp:  [97.3 °F (36.3 °C)-99 °F (37.2 °C)] 97.3 °F (36.3 °C)  Heart Rate:  [] 111  Resp:  [18-20] 20  BP:  ()/(43-82) 84/43  Flow (L/min) (Oxygen Therapy):  [3] 3     Physical Exam:  Constitutional: No acute distress, awake, alert  HENT: NCAT, mucous membranes moist  Respiratory: Decreased bilaterally, respiratory effort normal   Cardiovascular: RRR  Gastrointestinal: Positive bowel sounds, soft, nontender, mildly distended  Musculoskeletal: Positive bilateral lower extremity edema  Psychiatric: Appropriate affect, cooperative  Neurologic: Oriented to person and place but obviously confused and unable to have CODE STATUS discussion, strength symmetric in all extremities, Cranial Nerves grossly intact to confrontation, speech clear  Skin: Significant jaundice      Results Reviewed:  LAB RESULTS:      Lab 04/05/25  0842 04/04/25  2341 04/04/25  1724   WBC 16.88*  --  23.03*   HEMOGLOBIN 8.0* 8.5* 8.1*   HEMATOCRIT 25.1* 26.2* 25.1*   PLATELETS 171  --  221   NEUTROS ABS 13.54*  --  18.87*   IMMATURE GRANS (ABS) 0.12*  --  0.16*   LYMPHS ABS 1.55  --  1.61   MONOS ABS 1.46*  --  2.09*   EOS ABS 0.14  --  0.19   .7*  --  104.6*   PROCALCITONIN  --   --  0.62*   LACTATE  --   --  1.3   PROTIME 21.6*  --   --          Lab 04/05/25  0842 04/04/25  1724   SODIUM 135* 135*   POTASSIUM 4.0 4.2   CHLORIDE 105 103   CO2 22.0 24.0   ANION GAP 8.0 8.0   BUN 15 14   CREATININE 1.10* 1.11*   EGFR 56.9* 56.3*   GLUCOSE 97 95   CALCIUM 8.6 8.8   MAGNESIUM 2.0  --          Lab 04/05/25  0842 04/04/25  1724   TOTAL PROTEIN 5.9* 6.3   ALBUMIN 2.5* 2.6*   GLOBULIN 3.4 3.7   ALT (SGPT) 35* 36*   AST (SGOT) 174* 186*   BILIRUBIN 8.2* 8.5*   ALK PHOS 84 101   LIPASE  --  43         Lab 04/05/25  0842   PROTIME 21.6*   INR 1.75*             Lab 04/04/25  1724   IRON 65   IRON SATURATION (TSAT) 84*   TIBC 77*   TRANSFERRIN 52*   FERRITIN 926.40*         Brief Urine Lab Results  (Last result in the past 365 days)        Color   Clarity   Blood   Leuk Est   Nitrite   Protein   CREAT   Urine HCG        04/05/25 1039 Dark Yellow    Turbid   Large (3+)   Moderate (2+)   Positive   30 mg/dL (1+)                   Microbiology Results Abnormal       Procedure Component Value - Date/Time    Urine Culture - Urine, Straight Cath [[id number]]  (Abnormal) Collected: 04/04/25 1953    Lab Status: Preliminary result Specimen: Urine from Straight Cath Updated: 04/05/25 1101     Urine Culture >100,000 CFU/mL Escherichia coli    Narrative:      Colonization of the urinary tract without infection is common. Treatment is discouraged unless the patient is symptomatic, pregnant, or undergoing an invasive urologic procedure.            XR Chest 1 View  Result Date: 4/4/2025  XR CHEST 1 VW Date of Exam: 4/4/2025 5:20 PM EDT Indication: cough Comparison: 3/12/2025 Findings: No PICC is seen. Borderline cardiomegaly. Mild perihilar interstitial opacities may reflect mild edema. Left basilar opacity with likely small left pleural effusion. No pneumothorax. No evidence of acute osseous abnormalities. Visualized upper abdomen is  unremarkable.     Impression: Impression: 1.Mild perihilar and interstitial opacities may reflect mild edema. 2.Left basilar opacity with likely small left pleural effusion may reflect infection or atelectasis. 3.No PICC is seen. Electronically Signed: Alon Beauchamp MD  4/4/2025 5:46 PM EDT  Workstation ID: [id number]      Results for orders placed during the hospital encounter of 11/10/24    Adult Transthoracic Echo Complete W/ Cont if Necessary Per Protocol    Interpretation Summary    Left ventricular systolic function is normal. Estimated left ventricular EF = 65%    The cardiac valves are anatomically and functionally normal.      Current medications:  Scheduled Meds:albumin human, 50 g, Intravenous, TID  desvenlafaxine, 50 mg, Oral, Daily  folic acid, 1 mg, Oral, Daily  gabapentin, 200 mg, Oral, Q12H  ipratropium-albuterol, 3 mL, Nebulization, 4x Daily - RT  lactobacillus acidophilus, 1 capsule, Oral, Daily  lactulose, 10 g, Oral,  Daily  latanoprost, 1 drop, Both Eyes, Nightly  metoprolol succinate XL, 25 mg, Oral, Daily  midodrine, 10 mg, Oral, TID AC  pantoprazole, 40 mg, Oral, Daily  piperacillin-tazobactam, 3.375 g, Intravenous, Q8H  QUEtiapine, 100 mg, Oral, Nightly  rosuvastatin, 5 mg, Oral, Daily  sodium chloride, 10 mL, Intravenous, Q12H  thiamine, 100 mg, Oral, Daily  ursodiol, 300 mg, Oral, BID      Continuous Infusions:sodium chloride, 125 mL/hr, Last Rate: 125 mL/hr (04/05/25 0953)      PRN Meds:.  senna-docusate sodium **AND** polyethylene glycol **AND** bisacodyl **AND** bisacodyl    Calcium Replacement - Follow Nurse / BPA Driven Protocol    HYDROcodone-acetaminophen    ipratropium-albuterol    Magnesium Standard Dose Replacement - Follow Nurse / BPA Driven Protocol    nitroglycerin    Phosphorus Replacement - Follow Nurse / BPA Driven Protocol    Potassium Replacement - Follow Nurse / BPA Driven Protocol    [COMPLETED] Insert Peripheral IV **AND** sodium chloride    sodium chloride    sodium chloride    Assessment & Plan   Assessment & Plan     Active Hospital Problems    Diagnosis  POA    **Sepsis secondary to UTI [A41.9, N39.0]  Yes    Acute UTI (urinary tract infection) [N39.0]  Unknown    Pneumonia [J18.9]  Unknown    Sepsis [A41.9]  Unknown    Hypotension [I95.9]  Yes    Peripheral neuropathy [G62.9]  Yes    Alcohol use disorder [F10.90]  Yes    Alcoholic cirrhosis of liver without ascites [K70.30]  Yes    Essential hypertension [I10]  Yes    Generalized anxiety disorder [F41.1]  Yes    Hyperlipidemia [E78.5]  Yes    Glaucoma [H40.9]  Yes      Resolved Hospital Problems    Diagnosis Date Resolved POA    Dysphagia [R13.10] 04/04/2025 Yes        Brief Hospital Course to date:  Philomena Davis is a 61 y.o. female with a history of HTN, HLD, chronic pain disorder, cirrhosis of the liver, COPD, GERD, for fracture, was here 3/12/2025 - 3/19/2025 with EtOH hepatitis with cirrhosis, electrolyte abnormalities, Lisfranc and  third metatarsal fracture, and was discharged from here to [facility] for rehab.  Patient is here with sepsis, pneumonia, and a UTI.     Assessment and Plan:     Acute respiratory failure with hypoxia  Sepsis  Crystalloid refractory hypotension  Pneumonia (POA)-HCAP  --Chest x-ray shows mild perihilar and interstitial opacities may reflect mild edema.  Left basilar opacity with likely small left pleural effusion may reflect infection or atelectasis.  -- Respiratory panel PCR negative  -- WBC 23.03, procalcitonin 0.62  -- O2 sat 87% on room air  -- was given a liter of saline in the ED  -- BC x 2 pending  -- MRSA PCR pending  -- Urine Legionella and S.  Pneumo Ag pending  -- Sputum culture pending  -- Continuous pulse ox with supplemental oxygen as needed  -- Continue Zosyn, discontinue vancomycin as MRSA PCR negative  -- CT of the chest ordered on 4/5/2025     Acute UTI (present on admission)  -- UA: Appearance turbid, blood large, nitrite positive, leukocytes large, protein 100, bilirubin large, RBC 3-5, WBC TNTC, bacteria 4+, squamous 7-12  -- Urine culture pending/requested straight cath UA  -- zosyn     Elevated serum creatinine  Crystalloid refractory hypotension  -- Creatinine 1.11  -- Baseline creatinine ~ 0.7-0.9  -- was given a liter of saline in the ED  --Additional bolus ordered 4/5/2025 followed by normal saline maintenance  -- Will schedule albumin x 3 today given crystalloid refractory hypotension     Elevated LFTs/hyperbilirubinemia  Coagulopathy  EtOH hepatitis with cirrhosis, appears decompensated  Hepatic encephalopathy  Hypotension  -- Alk phos 101, , ALT 36, bili 8.5  -- Continue midodrine 3 times daily  -- High-protein diet  --MELD score 25  --GI consult requested  --INR 1.75  --Resume lactulose     Acute on chronic anemia  -- Hemoglobin 8.1, hematocrit 25.1  -- Likely related to EtOH marrow suppression  -- PPI   -- no overt signs of bleeding     Lisfranc and third metatarsal fracture  Unsteady  gait  Frequent falls  EtOH neuropathy  -- NWB RLE  -- PT/OT consult  -- Consult case management     EtOH abuse  -- Continue thiamine and folic acid  -- No alcohol since previous admission     HTN  HLD  -- Continue metoprolol, midodrine dose 3 times daily for orthostasis  -- Continue statin     Depression  -- Continue Pristiq     Peripheral neuropathy  -- Continue gabapentin     Glaucoma  -- Home eyedrops    Expected Discharge Location and Transportation: rehab  Expected Discharge   Expected Discharge Date: 4/7/2025; Expected Discharge Time:      VTE Prophylaxis:  Mechanical VTE prophylaxis orders are present.         AM-PAC 6 Clicks Score (PT): 10 (04/05/25 0927)    CODE STATUS:   Code Status and Medical Interventions: CPR (Attempt to Resuscitate); Full Support   Ordered at: 04/04/25 9228     Code Status (Patient has no pulse and is not breathing):    CPR (Attempt to Resuscitate)     Medical Interventions (Patient has pulse or is breathing):    Full Support     Level Of Support Discussed With:    Patient       Rosana Ibrahim MD  04/05/25        Electronically signed by Rosana Ibrahim MD at 04/05/25 1220          Consult Notes (all)        Rashawn Donald MD at 04/07/25 1101        Consult Orders    1. Inpatient Nephrology Consult [[id number]] ordered by Yecenia Clifford MD at 04/07/25 0950                 NAL Consult Note    Philomena Davis  1963  [id number]    Date of Admit:  4/4/2025  Date of Consult: 4/7/2025    Reason for Consultation: Acute kidney injury.     History of present illness:    Patient is a 61 y.o.  Yr old female with past medical history of alcohol liver cirrhosis, hypertension, dyslipidemia, COPD, GERD and multiple medical problems as below; who was recently admitted from 3/12 to 3/19/2025 after fall and found to have right Lisfranc and third metatarsal fracture.  Patient was discharged to [facility] rehab; came back to hospital on 4/4 as she was found to be febrile and  noted to have leukocytosis.  Patient was also complaining of shortness of breath and diarrhea for last few days prior to admission.  On admission patient was noted to be hypotensive.  Upgraded to ICU.  started on broad-spectrum antibiotics and on pressors.  Labs on admission notable for creatinine 1.1 mg/dl; during the hospital course creatinine up to 1.5 mg/dL prompted nephrology consult.     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 ankle with nonunion 03/24/2017    Closed trimalleolar fracture of right ankle 05/24/2023    Colon polyp 2018    COPD (chronic obstructive pulmonary disease) 2022    Digital mucous cyst of toe of left foot 03/24/2017    Eating disorder 2022    Fracture of ankle 2017    Fracture, clavicle 1969    Fracture, finger 1972    Fracture, foot 1/30/25    Ganglion cyst of left foot     GERD (gastroesophageal reflux disease) 10/22    Glaucoma     Hip arthrosis 2019    Hyperlipidemia 1999    Hypertension     Knee sprain 2019    Knee swelling 2019    Low back strain 1/15/25    Neck strain 1/30/25    Panic disorder 1987    Periarthritis of shoulder 2019    Peripheral neuropathy     Pulmonary arterial hypertension 2021    Urinary tract infection 02/23/23    Varicella 1968    Wears glasses        Past Surgical History:   Procedure Laterality Date    ANKLE OPEN REDUCTION INTERNAL FIXATION Left 03/24/2017    Procedure: LEFT ANKLE OPEN REDUCTION INTERNAL FIXATION WITH ILIAC CREST BONE GRAFT , EXCISION CYST 4TH/5TH INNERSPACE LEFT FOOT;  Surgeon: Frank Larson MD;  Location: [facility];  Service:     ANKLE OPEN REDUCTION INTERNAL  FIXATION Right 2023    Procedure: ANKLE OPEN REDUCTION INTERNAL FIXATION;  Surgeon: Deandre Reynoso MD;  Location:  [facility];  Service: Orthopedics;  Laterality: Right;    AUGMENTATION MAMMAPLASTY Bilateral     BREAST AUGMENTATION      BREAST EXCISIONAL BIOPSY Right 2014    COLONOSCOPY  2018    DILATATION AND CURETTAGE      DILATION AND CURETTAGE, DIAGNOSTIC / THERAPEUTIC      ENDOSCOPY N/A 2022    Procedure: ESOPHAGOGASTRODUODENOSCOPY;  Surgeon: Arelis Solano MD;  Location:  [facility];  Service: Gastroenterology;  Laterality: N/A;    ENDOSCOPY N/A 2024    Procedure: ESOPHAGOGASTRODUODENOSCOPY;  Surgeon: Brunner, Mark I, MD;  Location:  [facility];  Service: Gastroenterology;  Laterality: N/A;    FOOT SURGERY      FRACTURE SURGERY      LA RPR NON/MAL FEMUR DSTL H/N W/ILIAC/AUTOG BONE Left 2017    Procedure: ILIAC CREST BONE GRAFT;  Surgeon: Frank Larson MD;  Location:  [facility];  Service: Orthopedics    WISDOM TOOTH EXTRACTION      WRIST SURGERY         Social History     Socioeconomic History    Marital status: Single   Tobacco Use    Smoking status: Former     Current packs/day: 0.00     Average packs/day: 1.5 packs/day for 24.0 years (36.1 ttl pk-yrs)     Types: Cigarettes     Start date: 3/5/1985     Quit date: 3/22/2009     Years since quittin.0     Passive exposure: Past    Smokeless tobacco: Never   Vaping Use    Vaping status: Never Used   Substance and Sexual Activity    Alcohol use: Not Currently     Alcohol/week: 5.0 standard drinks of alcohol     Types: 5 Glasses of wine per week    Drug use: No    Sexual activity: Not Currently     Partners: Male     Birth control/protection: Abstinence, Post-menopausal     Comment: Menopausal Post 21 Yrs       family history includes ADD / ADHD in her brother; Alcohol abuse in her brother; Asthma in her maternal aunt, maternal grandfather, and paternal grandmother; Bipolar disorder in her  brother; Breast cancer (age of onset: 68) in her paternal grandmother; COPD in her father, maternal aunt, and maternal uncle; Cancer in her mother and another family member; Dementia in her cousin and paternal aunt; Depression in her mother; Drug abuse in her brother; Heart disease in her maternal uncle, paternal grandfather, and another family member; Hypertension in an other family member; Mental illness in her brother; No Known Problems in her daughter, sister, and son; Rheum arthritis in an other family member; Stroke in her maternal grandmother and another family member; Vision loss in her maternal grandmother and mother.    Allergies   Allergen Reactions    Buspirone Anxiety    Codeine Nausea Only    Mirtazapine Anxiety       Medication:    Current Facility-Administered Medications:     sennosides-docusate (PERICOLACE) 8.6-50 MG per tablet 2 tablet, 2 tablet, Oral, BID PRN **AND** polyethylene glycol (MIRALAX) packet 17 g, 17 g, Oral, Daily PRN **AND** bisacodyl (DULCOLAX) EC tablet 5 mg, 5 mg, Oral, Daily PRN **AND** bisacodyl (DULCOLAX) suppository 10 mg, 10 mg, Rectal, Daily PRN, Ana Rosa Bañuelos APRN    Calcium Replacement - Follow Nurse / BPA Driven Protocol, , Not Applicable, PRN, Ana Rosa Bañuelos, APRN    desvenlafaxine (PRISTIQ) 24 hr tablet 50 mg, 50 mg, Oral, Daily, Ana Rosa Bañuelos APRN, 50 mg at 04/07/25 0827    folic acid (FOLVITE) tablet 1 mg, 1 mg, Oral, Daily, Ana Rosa Bañuelos, APRN, 1 mg at 04/07/25 0827    gabapentin (NEURONTIN) capsule 200 mg, 200 mg, Oral, Q12H, Ana Rosa Bañuelos APRN, 200 mg at 04/07/25 0827    HYDROcodone-acetaminophen (NORCO) 5-325 MG per tablet 1 tablet, 1 tablet, Oral, Q4H PRN, Ana Rosa Bañuelos APRN    Hydrocortisone Sod Suc (PF) (Solu-CORTEF) injection 100 mg, 100 mg, Intravenous, Q8H, Carl Tuttle MD, 100 mg at 04/07/25 0647    ipratropium-albuterol (DUO-NEB) nebulizer solution 3 mL, 3 mL, Nebulization, Q4H PRN, Ana Rosa Bañuelos  CANDY TONG    ipratropium-albuterol (DUO-NEB) nebulizer solution 3 mL, 3 mL, Nebulization, 4x Daily - RT, Ana Rosa Bañuelos APRN, 3 mL at 04/07/25 0657    lactobacillus acidophilus (RISAQUAD) capsule 1 capsule, 1 capsule, Oral, Daily, Ana Rosa Bañuelos APRN, 1 capsule at 04/07/25 0826    lactulose (CHRONULAC) 10 GM/15ML solution 10 g, 10 g, Oral, Daily, Rosana Ibrahim MD, 10 g at 04/06/25 0921    latanoprost (XALATAN) 0.005 % ophthalmic solution 1 drop, 1 drop, Both Eyes, Nightly, Ana Rosa Bañuelos APRN, 1 drop at 04/06/25 2247    Magnesium Standard Dose Replacement - Follow Nurse / BPA Driven Protocol, , Not Applicable, PRN, Ana Rosa Bañuelos APRN    [Held by provider] metoprolol succinate XL (TOPROL-XL) 24 hr tablet 25 mg, 25 mg, Oral, Daily, Ana Rosa Bañuelos APRN    midodrine (PROAMATINE) tablet 10 mg, 10 mg, Oral, TID AC, Ana Rosa Bañuelos APRN, 10 mg at 04/07/25 0827    mupirocin (BACTROBAN) 2 % nasal ointment 1 Application, 1 Application, Each Nare, BID, Eileen Carvalho PA-C, 1 Application at 04/07/25 0826    nitroglycerin (NITROSTAT) SL tablet 0.4 mg, 0.4 mg, Sublingual, Q5 Min PRN, Ana Rosa Bañuelos APRN    pantoprazole (PROTONIX) EC tablet 40 mg, 40 mg, Oral, Daily, Ana Rosa Bañuelos APRN, 40 mg at 04/07/25 0827    phenylephrine (SHERMAN-SYNEPHRINE) 100 mg in sodium chloride 0.9 % 250 mL infusion, 0.5-3 mcg/kg/min, Intravenous, Titrated, Patrice Tran, PharmLEELA, Last Rate: 27.2 mL/hr at 04/07/25 0925, 2 mcg/kg/min at 04/07/25 0925    phenylephrine (SHERMAN-SYNEPHRINE) 50 mg in sodium chloride 0.9 % 250 mL infusion, 0.5-3 mcg/kg/min, Intravenous, Titrated, Eileen Carvalho PA-C, Last Rate: 54.4 mL/hr at 04/07/25 0521, 2 mcg/kg/min at 04/07/25 0521    Phosphorus Replacement - Follow Nurse / BPA Driven Protocol, , Not Applicable, PRN, Ana Rosa Bañuelos, APRN    piperacillin-tazobactam (ZOSYN) 3.375 g IVPB in 100 mL NS MBP (CD), 3.375 g, Intravenous, Q8H, Ana Rosa Bañuelos, APRN, 3.375  g at 04/07/25 0926    Potassium Replacement - Follow Nurse / BPA Driven Protocol, , Not Applicable, PRN, Ana Rosa Bañuelos, APRN    QUEtiapine (SEROquel) tablet 100 mg, 100 mg, Oral, Nightly, Ana Rosa Bañuelos, APRN, 100 mg at 04/05/25 2009    riFAXIMin (XIFAXAN) tablet 550 mg, 550 mg, Oral, Q12H, Rosana Ibrahim MD, 550 mg at 04/07/25 0827    rosuvastatin (CRESTOR) tablet 5 mg, 5 mg, Oral, Daily, Ana Rosa Bañuelos, APRN, 5 mg at 04/07/25 0827    sodium chloride 0.9 % flush 10 mL, 10 mL, Intravenous, PRN, Ana Rosa Bañuelos, APRN    sodium chloride 0.9 % flush 10 mL, 10 mL, Intravenous, Q12H, Eileen Carvalho PA-C, 10 mL at 04/07/25 0829    sodium chloride 0.9 % flush 10 mL, 10 mL, Intravenous, Q12H, Eileen Carvalho PA-C, 10 mL at 04/07/25 0829    sodium chloride 0.9 % flush 10 mL, 10 mL, Intravenous, Q12H, Eileen Carvalho PA-C, 10 mL at 04/07/25 0829    sodium chloride 0.9 % flush 10 mL, 10 mL, Intravenous, PRN, Eileen Carvalho PA-C    sodium chloride 0.9 % flush 20 mL, 20 mL, Intravenous, PRN, Eileen Carvalho PA-C    sodium chloride 0.9 % infusion 40 mL, 40 mL, Intravenous, PRN, Ana Rosa Bañuelos APRN    sodium chloride 0.9 % infusion 40 mL, 40 mL, Intravenous, PRN, Eileen Carvalho PA-C    thiamine (VITAMIN B-1) tablet 100 mg, 100 mg, Oral, Daily, Ana Rosa Bañuelos APRN, 100 mg at 04/07/25 0827    ursodiol (ACTIGALL) capsule 300 mg, 300 mg, Oral, BID, Ana Rosa Bañuelos APRN, 300 mg at 04/07/25 0827    vasopressin (PITRESSIN) 20 units in 100 mL NS infusion, 0.03 Units/min, Intravenous, Continuous, Eileen Carvalho PA-C, Held at 04/06/25 4335    Medications Prior to Admission   Medication Sig Dispense Refill Last Dose/Taking    desvenlafaxine (PRISTIQ) 50 MG 24 hr tablet Take 1 tablet by mouth Daily. Indications: Major Depressive Disorder   4/4/2025    folic acid (FOLVITE) 1 MG tablet Take 1 tablet by mouth Daily. Indications: Anemia From Inadequate Folic Acid   4/4/2025    gabapentin (NEURONTIN) 100 MG capsule  Take 2 capsules by mouth Every 12 (Twelve) Hours. Indications: Neuropathic Pain 120 capsule 0 4/4/2025    lactulose (CHRONULAC) 10 GM/15ML solution Take 30 mL by mouth 2 (Two) Times a Day.   4/4/2025    latanoprost (XALATAN) 0.005 % ophthalmic solution Administer 1 drop to both eyes Every Night. Indications: Wide-Angle Glaucoma 2.5 mL 5 4/3/2025    metoprolol succinate XL (TOPROL-XL) 25 MG 24 hr tablet Take 1 tablet by mouth Daily. Indications: Atrial Flutter 90 tablet 3 4/4/2025    midodrine (PROAMATINE) 10 MG tablet Take 1 tablet by mouth 3 (Three) Times a Day Before Meals. Indications: Disorder of Low Blood Pressure   4/4/2025    pantoprazole (PROTONIX) 40 MG EC tablet Take 1 tablet by mouth Daily. Indications: Heartburn 60 tablet 5 4/4/2025    rosuvastatin (CRESTOR) 5 MG tablet TAKE 1 TABLET BY MOUTH DAILY 90 tablet 0 4/4/2025    thiamine (VITAMIN B-1) 100 MG tablet tablet Take 1 tablet by mouth Daily. Indications: Deficiency of Vitamin B1   4/4/2025    ursodiol (ACTIGALL) 300 MG capsule Take 1 capsule by mouth 2 (Two) Times a Day. Indications: Liver Disease 60 capsule 5 4/4/2025    albuterol sulfate  (90 Base) MCG/ACT inhaler Inhale 2 puffs Every 4 (Four) Hours As Needed for Wheezing. Indications: Chronic Obstructive Lung Disease 6.7 g 2 Unknown    HYDROcodone-acetaminophen (NORCO) 5-325 MG per tablet Take 1 tablet by mouth Every 4 (Four) Hours As Needed (pain). Indications: Pain 15 tablet 0 Unknown    LACTOBACILLUS PO Take 1 capsule by mouth Daily. Indications:       naloxone (NARCAN) 4 MG/0.1ML nasal spray Call 911. Don't prime. [place] in 1 nostril for overdose. Repeat in 2-3 minutes in other nostril if no or minimal breathing/responsiveness.  Indications: Opioid Overdose 2 each 0 Unknown    ondansetron ODT (ZOFRAN-ODT) 4 MG disintegrating tablet Place 1 tablet on the tongue Every 6 (Six) Hours As Needed for Nausea or Vomiting. As needed for nausea 12 tablet 0 Unknown    QUEtiapine (SEROquel) 100 MG  "tablet TAKE 1 TABLET BY MOUTH EVERY NIGHT FOR 30 DAYS. (Patient not taking: Reported on 4/5/2025) 30 tablet 0 Not Taking       Review of Systems:  Full review of systems reviewed and negative otherwise for acute complaints    Physical Exam:   Vital Signs   Blood pressure 103/57, pulse 80, temperature 97.6 °F (36.4 °C), temperature source Oral, resp. rate 24, height 165.1 cm (65\"), weight 90.6 kg (199 lb 11.8 oz), last menstrual period 01/22/2016, SpO2 100%.     GENERAL: Awake and alert, in no acute distress.   HEENT: Normocephalic, atraumatic.    NECK: Supple   HEART: RRR; No murmur, rubs, gallops. ++  edema  LUNGS: Clear to auscultation bilaterally   ABDOMEN: Soft, nontender, nondistended.   JOINTS:  Full range of motion, no redness or tenderness.  EXT:  No cyanosis, clubbing or edema.  : No [place]   SKIN: Warm and dry without rash  NEURO: No focal neurological deficits. Strength equal bilateral  PSYCHIATRIC: Cooperative with PE    Laboratory Data  Results from last 7 days   Lab Units 04/07/25  0505 04/06/25  1130 04/05/25  1838   HEMOGLOBIN g/dL 8.2* 7.3* 7.6*   HEMATOCRIT % 25.9* 23.0* 23.9*     Results from last 7 days   Lab Units 04/07/25  0505 04/06/25  1130 04/05/25  0842 04/04/25  1724   SODIUM mmol/L 133* 134* 135* 135*   POTASSIUM mmol/L 4.1 3.8 4.0 4.2   CHLORIDE mmol/L 103 103 105 103   CO2 mmol/L 18.0* 19.0* 22.0 24.0   BUN mg/dL 22 19 15 14   CREATININE mg/dL 1.57* 1.40* 1.10* 1.11*   CALCIUM mg/dL 8.4* 8.7 8.6 8.8   PHOSPHORUS mg/dL  --  3.1  --   --    MAGNESIUM mg/dL 2.0 2.0 2.0  --    ALBUMIN g/dL  --  3.7 2.5* 2.6*     Results from last 7 days   Lab Units 04/07/25  0505   GLUCOSE mg/dL 148*     Results from last 7 days   Lab Units 04/05/25  1039   COLOR UA  Dark Yellow*   CLARITY UA  Turbid*   PH, URINE  5.5   SPECIFIC GRAVITY, URINE  1.018   GLUCOSE UA  Negative   KETONES UA  Negative   BILIRUBIN UA  Large (3+)*   PROTEIN UA  30 mg/dL (1+)*   BLOOD UA  Large (3+)*   LEUKOCYTES UA  Moderate (2+)* "   NITRITE UA  Positive*     Results from last 7 days   Lab Units 04/06/25  1130   ALK PHOS U/L 88   BILIRUBIN mg/dL 7.6*   ALT (SGPT) U/L 32   AST (SGOT) U/L 154*     Estimated Creatinine Clearance: 41.3 mL/min (A) (by C-G formula based on SCr of 1.57 mg/dL (H)).    Radiology:  CT chest: Impression: 4/5/25  1.Moderate bilateral pleural effusions with significant passive atelectasis affecting the entire left lower lobe and a large portion of the right lower lobe.  2.Patchy bilateral airspace disease and interstitial thickening. This is favored to reflect pneumonia, but pulmonary edema could have a similar appearance. Correlate with laboratory values and symptoms to differentiate.  3.Cardiomegaly and coronary artery disease.  4.Hepatosplenomegaly and hepatic steatosis. Lobulated liver margins worrisome for cirrhosis.  5.Body wall edema.    Chest X ray: 4/4/25   Impression:  1.Mild perihilar and interstitial opacities may reflect mild edema.  2.Left basilar opacity with likely small left pleural effusion may reflect infection or atelectasis.    Impression:     Acute kidney injury  Sepsis /E. coli UTI.   Hyponatremia  Metabolic acidosis  Fluid overload  Anemia   Liver cirrhosis    PLAN: Thank you for asking us to see Philomena Davis, I recommend the following:     Acute kidney injury:   - Creatinine on admission 1.1 mg/dL.  - Creatinine up to 1.5. [Patient looks volume up on exam]   - Likely acute kidney injury due to ATN from hypotension where hepatorenal syndrome cannot be ruled out in the setting of liver cirrhosis.   - Patient is very volume overloaded on exam; will start Bumex 1 mg twice daily with albumin.   - Strict ROSI's.   - Dose meds for GFR  - Avoid nephrotoxins  - No indication for dialysis at this time     Sepsis/E. coli UTI:  - Status ongoing Zosyn on admission.. now on ceftriaxone.    - Currently on pressor.     Hyponatremia /fluid overload  - Diuretics as above.     Metabolic acidosis:  - Will  monitor closely.     Liver cirrhosis/anemia:   - Transfuse as needed to keep hemoglobin above 7.     Thank you for the consult, will follow with you closely.     High risk and critically ill patient    Rashawn Donald MD  4/7/2025  11:01 EDT                    Electronically signed by Rashawn Donald MD at 04/07/25 1419       Brunner, Mark I, MD at 04/05/25 1501        Consult Orders    1. Inpatient Gastroenterology Consult [[id number]] ordered by Rosana Ibrahim MD at 04/05/25 1213                 [facility] Gastroenterology Consult    Referring Provider: Rosana Ibrahim MD    PCP: Unique Brandt DO    Reason for Consultation: Decompensated cirrhosis    Chief complaint: Fever    History of present illness:    Philomena Davis is a 61 y.o. female who is admitted with cough, fever, and URI symptoms.  She is found to have leukocytosis, hypoxia, and is treated for left lower lobe pneumonia.  She was recently discharged to [facility] rehab following a 1-week admission for alcoholic hepatitis, electrolyte disturbances, JAMMIE, and recent right foot fracture.    Allergies:  Buspirone, Codeine, and Mirtazapine    Scheduled Meds:  albumin human, 50 g, Intravenous, TID  desvenlafaxine, 50 mg, Oral, Daily  folic acid, 1 mg, Oral, Daily  gabapentin, 200 mg, Oral, Q12H  ipratropium-albuterol, 3 mL, Nebulization, 4x Daily - RT  lactobacillus acidophilus, 1 capsule, Oral, Daily  lactulose, 10 g, Oral, Daily  latanoprost, 1 drop, Both Eyes, Nightly  metoprolol succinate XL, 25 mg, Oral, Daily  midodrine, 10 mg, Oral, TID AC  pantoprazole, 40 mg, Oral, Daily  piperacillin-tazobactam, 3.375 g, Intravenous, Q8H  QUEtiapine, 100 mg, Oral, Nightly  rifAXIMin, 550 mg, Oral, Q12H  rosuvastatin, 5 mg, Oral, Daily  sodium chloride, 10 mL, Intravenous, Q12H  thiamine, 100 mg, Oral, Daily  ursodiol, 300 mg, Oral, BID         Infusions:  sodium chloride, 125 mL/hr, Last Rate: 125 mL/hr (04/05/25 0953)        PRN Meds:    senna-docusate  sodium **AND** polyethylene glycol **AND** bisacodyl **AND** bisacodyl    Calcium Replacement - Follow Nurse / BPA Driven Protocol    HYDROcodone-acetaminophen    ipratropium-albuterol    Magnesium Standard Dose Replacement - Follow Nurse / BPA Driven Protocol    nitroglycerin    Phosphorus Replacement - Follow Nurse / BPA Driven Protocol    Potassium Replacement - Follow Nurse / BPA Driven Protocol    [COMPLETED] Insert Peripheral IV **AND** sodium chloride    sodium chloride    sodium chloride    Home Meds:  Medications Prior to Admission   Medication Sig Dispense Refill Last Dose/Taking    desvenlafaxine (PRISTIQ) 50 MG 24 hr tablet Take 1 tablet by mouth Daily. Indications: Major Depressive Disorder   4/4/2025    folic acid (FOLVITE) 1 MG tablet Take 1 tablet by mouth Daily. Indications: Anemia From Inadequate Folic Acid   4/4/2025    gabapentin (NEURONTIN) 100 MG capsule Take 2 capsules by mouth Every 12 (Twelve) Hours. Indications: Neuropathic Pain 120 capsule 0 4/4/2025    lactulose (CHRONULAC) 10 GM/15ML solution Take 30 mL by mouth 2 (Two) Times a Day.   4/4/2025    latanoprost (XALATAN) 0.005 % ophthalmic solution Administer 1 drop to both eyes Every Night. Indications: Wide-Angle Glaucoma 2.5 mL 5 4/3/2025    metoprolol succinate XL (TOPROL-XL) 25 MG 24 hr tablet Take 1 tablet by mouth Daily. Indications: Atrial Flutter 90 tablet 3 4/4/2025    midodrine (PROAMATINE) 10 MG tablet Take 1 tablet by mouth 3 (Three) Times a Day Before Meals. Indications: Disorder of Low Blood Pressure   4/4/2025    pantoprazole (PROTONIX) 40 MG EC tablet Take 1 tablet by mouth Daily. Indications: Heartburn 60 tablet 5 4/4/2025    QUEtiapine (SEROquel) 100 MG tablet TAKE 1 TABLET BY MOUTH EVERY NIGHT FOR 30 DAYS. 30 tablet 0 4/3/2025    rosuvastatin (CRESTOR) 5 MG tablet TAKE 1 TABLET BY MOUTH DAILY 90 tablet 0 4/4/2025    thiamine (VITAMIN B-1) 100 MG tablet tablet Take 1 tablet by mouth Daily. Indications: Deficiency of  Vitamin B1   4/4/2025    ursodiol (ACTIGALL) 300 MG capsule Take 1 capsule by mouth 2 (Two) Times a Day. Indications: Liver Disease 60 capsule 5 4/4/2025    albuterol sulfate  (90 Base) MCG/ACT inhaler Inhale 2 puffs Every 4 (Four) Hours As Needed for Wheezing. Indications: Chronic Obstructive Lung Disease 6.7 g 2 Unknown    HYDROcodone-acetaminophen (NORCO) 5-325 MG per tablet Take 1 tablet by mouth Every 4 (Four) Hours As Needed (pain). Indications: Pain 15 tablet 0 Unknown    LACTOBACILLUS PO Take 1 capsule by mouth Daily. Indications:       naloxone (NARCAN) 4 MG/0.1ML nasal spray Call 911. Don't prime. [place] in 1 nostril for overdose. Repeat in 2-3 minutes in other nostril if no or minimal breathing/responsiveness.  Indications: Opioid Overdose 2 each 0 Unknown    ondansetron ODT (ZOFRAN-ODT) 4 MG disintegrating tablet Place 1 tablet on the tongue Every 6 (Six) Hours As Needed for Nausea or Vomiting. As needed for nausea 12 tablet 0 Unknown       ROS: Review of Systems   Constitutional:  Positive for fatigue and fever.   HENT:  Negative for trouble swallowing.    Respiratory:  Positive for cough.    Cardiovascular:  Positive for leg swelling.   Gastrointestinal:  Negative for abdominal pain, blood in stool, nausea and vomiting.   Skin:  Positive for color change.   Psychiatric/Behavioral:  Positive for decreased concentration. Negative for agitation and behavioral problems.        PAST MED HX:  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 ankle with nonunion 03/24/2017    Closed trimalleolar fracture of right ankle 05/24/2023    Colon polyp 2018     COPD (chronic obstructive pulmonary disease) 2022    Digital mucous cyst of toe of left foot 03/24/2017    Eating disorder 2022    Fracture of ankle 2017    Fracture, clavicle 1969    Fracture, finger 1972    Fracture, foot 1/30/25    Ganglion cyst of left foot     GERD (gastroesophageal reflux disease) 10/22    Glaucoma     Hip arthrosis 2019    Hyperlipidemia 1999    Hypertension     Knee sprain 2019    Knee swelling 2019    Low back strain 1/15/25    Neck strain 1/30/25    Panic disorder 1987    Periarthritis of shoulder 2019    Peripheral neuropathy     Pulmonary arterial hypertension 2021    Urinary tract infection 02/23/23    Varicella 1968    Wears glasses        PAST SURG HX:  Past Surgical History:   Procedure Laterality Date    ANKLE OPEN REDUCTION INTERNAL FIXATION Left 03/24/2017    Procedure: LEFT ANKLE OPEN REDUCTION INTERNAL FIXATION WITH ILIAC CREST BONE GRAFT , EXCISION CYST 4TH/5TH INNERSPACE LEFT FOOT;  Surgeon: Frank Larson MD;  Location:  [facility];  Service:     ANKLE OPEN REDUCTION INTERNAL FIXATION Right 05/25/2023    Procedure: ANKLE OPEN REDUCTION INTERNAL FIXATION;  Surgeon: Deandre Reynoso MD;  Location:  [facility];  Service: Orthopedics;  Laterality: Right;    AUGMENTATION MAMMAPLASTY Bilateral 1999    BREAST AUGMENTATION      BREAST EXCISIONAL BIOPSY Right 2014    COLONOSCOPY  2018    DILATATION AND CURETTAGE  1987    DILATION AND CURETTAGE, DIAGNOSTIC / THERAPEUTIC      ENDOSCOPY N/A 11/30/2022    Procedure: ESOPHAGOGASTRODUODENOSCOPY;  Surgeon: Arelis Solano MD;  Location:  [facility];  Service: Gastroenterology;  Laterality: N/A;    ENDOSCOPY N/A 02/01/2024    Procedure: ESOPHAGOGASTRODUODENOSCOPY;  Surgeon: Brunner, Mark I, MD;  Location:  [facility];  Service: Gastroenterology;  Laterality: N/A;    FOOT SURGERY  2012    FRACTURE SURGERY  5 /26/2023    MD RPR NON/MAL FEMUR DSTL H/N W/ILIAC/AUTOG BONE Left 03/24/2017    Procedure: ILIAC CREST BONE GRAFT;   Surgeon: Frank Larson MD;  Location: [facility];  Service: Orthopedics    WISDOM TOOTH EXTRACTION      WRIST SURGERY         FAM HX:  Family History   Problem Relation Age of Onset    Depression Mother     Cancer Mother         Lung cancer    Vision loss Mother     COPD Father         Copd    No Known Problems Sister     Drug abuse Brother     Bipolar disorder Brother     Alcohol abuse Brother     ADD / ADHD Brother     Mental illness Brother     Asthma Maternal Aunt     Dementia Paternal Aunt     Stroke Maternal Grandmother     Vision loss Maternal Grandmother         Stroke    Breast cancer Paternal Grandmother 68        met to brain    Asthma Paternal Grandmother         Breast cancer    Dementia Cousin     No Known Problems Daughter     No Known Problems Son     Rheum arthritis Other     Heart disease Other     Cancer Other     Stroke Other     Hypertension Other     Asthma Maternal Grandfather         Pancreas canet    Heart disease Paternal Grandfather     COPD Maternal Aunt         COPD    COPD Maternal Uncle         Non-Wilsons Lymphoma    Heart disease Maternal Uncle     BRCA 1/2 Neg Hx     Colon cancer Neg Hx     Endometrial cancer Neg Hx     Ovarian cancer Neg Hx        SOC HX:  Social History     Socioeconomic History    Marital status: Single   Tobacco Use    Smoking status: Former     Current packs/day: 0.00     Average packs/day: 1.5 packs/day for 24.0 years (36.1 ttl pk-yrs)     Types: Cigarettes     Start date: 3/5/1985     Quit date: 3/22/2009     Years since quittin.0     Passive exposure: Past    Smokeless tobacco: Never   Vaping Use    Vaping status: Never Used   Substance and Sexual Activity    Alcohol use: Not Currently     Alcohol/week: 5.0 standard drinks of alcohol     Types: 5 Glasses of wine per week    Drug use: No    Sexual activity: Not Currently     Partners: Male     Birth control/protection: Abstinence, Post-menopausal     Comment: Menopausal Post 21 Yrs  "      PHYSICAL EXAM  BP (!) 85/45 (BP Location: Left arm, Patient Position: Lying)   Pulse 113   Temp 97.9 °F (36.6 °C) (Oral)   Resp 18   Ht 165.1 cm (65\")   Wt 90.6 kg (199 lb 11.2 oz)   LMP 01/22/2016 (Approximate)   SpO2 97%   BMI 33.23 kg/m²   Wt Readings from Last 3 Encounters:   04/04/25 90.6 kg (199 lb 11.2 oz)   03/13/25 68 kg (149 lb 14.6 oz)   03/10/25 71.7 kg (158 lb)   ,body mass index is 33.23 kg/m².  Physical Exam  Constitutional:       General: She is not in acute distress.     Appearance: She is ill-appearing. She is not toxic-appearing.   HENT:      Nose: Nose normal.      Mouth/Throat:      Mouth: Mucous membranes are moist.   Eyes:      General: Scleral icterus present.      Conjunctiva/sclera: Conjunctivae normal.   Cardiovascular:      Rate and Rhythm: Tachycardia present.      Pulses: Normal pulses.   Pulmonary:      Effort: Pulmonary effort is normal. No respiratory distress.   Abdominal:      General: Bowel sounds are normal. There is no distension.      Palpations: Abdomen is soft.      Tenderness: There is no abdominal tenderness. There is no guarding.   Musculoskeletal:      Cervical back: Normal range of motion.      Right lower leg: Edema present.      Left lower leg: Edema present.      Comments: Sarcopenia.   Skin:     General: Skin is warm and dry.      Coloration: Skin is jaundiced.      Comments: Spider telangiectasias on chest.   Neurological:      General: No focal deficit present.      Mental Status: She is alert and oriented to person, place, and time.      Comments: Minimal asterixis.   Psychiatric:         Mood and Affect: Mood normal.         Behavior: Behavior normal.     Results Review:   I reviewed the patient's new clinical results.    Lab Results   Component Value Date    WBC 16.88 (H) 04/05/2025    HGB 8.0 (L) 04/05/2025    HGB 8.5 (L) 04/04/2025    HGB 8.1 (L) 04/04/2025    HCT 25.1 (L) 04/05/2025    .7 (H) 04/05/2025     04/05/2025       Lab " Results   Component Value Date    INR 1.75 (H) 04/05/2025    INR 1.22 (H) 01/30/2025    INR 1.18 (H) 11/10/2024       Lab Results   Component Value Date    GLUCOSE 97 04/05/2025    BUN 15 04/05/2025    CREATININE 1.10 (H) 04/05/2025    EGFRIFNONA 114 05/15/2019    BCR 13.6 04/05/2025     (L) 04/05/2025    K 4.0 04/05/2025    CO2 22.0 04/05/2025    CALCIUM 8.6 04/05/2025    ALBUMIN 2.5 (L) 04/05/2025    ALKPHOS 84 04/05/2025    BILITOT 8.2 (H) 04/05/2025    BILIDIR 5.9 (H) 03/13/2025    ALT 35 (H) 04/05/2025     (H) 04/05/2025     ASSESSMENTS/PLANS    1.  Recent acute alcoholic hepatitis.  Has not drank alcohol since 3/12/2025.  2.  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 MELD-Na = 24.  Portal gastropathy, but no varices on 2/1/2024 EGD.  3.  Hepatic encephalopathy, grade 1.  4.  Coagulopathy of liver disease, worsening.  Suspect vitamin K deficiency after recent courses of antibiotics.  5.  Alcoholic polyneuropathy.  6.  Left lower lobe pneumonia with sepsis syndrome.  7.  Recent right foot fracture.  8.  Recent LA grade D reflux esophagitis on EGD 2/1/2024.  9.  Recent gastric antrum ulcer with pigment spot 2/1/2024.  10.  Sarcopenia and severe deconditioning.  Nonambulatory.    >> Continue lactulose.  >> Continue rifaximin.  >> IV vitamin K, and repeat INR tomorrow.  >> Twice daily PPI.  >> Surveillance EGD to assess ulcer healing on Monday, for 4/7/2025.    Prognosis is guarded.  MELD predicts 15% 90-day mortality, but malnutrition/sarcopenia and frailty multiply mortality by 2.5X.  Needs close follow-up with CLAUDE Cox at [facility] hepatology.    I discussed the patient's findings and my recommendations with patient.    Mark I. Brunner, MD  04/05/25  15:01 EDT      Electronically signed by Brunner, Mark I, MD at 04/05/25 0793        no

## 2025-06-11 NOTE — ED ADULT TRIAGE NOTE - NS ED NURSE DIRECT TO ROOM YN
- Vitamin-D goal >30  - Continue current supplements vitamin D3 4000 IU daily  - Check level regularly   No

## 2025-06-11 NOTE — ED ADULT TRIAGE NOTE - DOMESTIC TRAVEL HIGH RISK QUESTION
Called pt to go over pain diary questions, pt did not answer, left msg for pt to call office back       No
